# Patient Record
Sex: MALE | Race: BLACK OR AFRICAN AMERICAN | NOT HISPANIC OR LATINO | Employment: UNEMPLOYED | URBAN - NONMETROPOLITAN AREA
[De-identification: names, ages, dates, MRNs, and addresses within clinical notes are randomized per-mention and may not be internally consistent; named-entity substitution may affect disease eponyms.]

---

## 2024-01-25 ENCOUNTER — OFFICE VISIT (OUTPATIENT)
Dept: FAMILY MEDICINE | Facility: CLINIC | Age: 69
End: 2024-01-25
Payer: MEDICARE

## 2024-01-25 VITALS
HEIGHT: 66 IN | DIASTOLIC BLOOD PRESSURE: 96 MMHG | SYSTOLIC BLOOD PRESSURE: 140 MMHG | WEIGHT: 165 LBS | HEART RATE: 85 BPM | TEMPERATURE: 98 F | BODY MASS INDEX: 26.52 KG/M2 | OXYGEN SATURATION: 99 %

## 2024-01-25 DIAGNOSIS — I10 PRIMARY HYPERTENSION: Primary | ICD-10-CM

## 2024-01-25 DIAGNOSIS — Z12.11 COLON CANCER SCREENING: ICD-10-CM

## 2024-01-25 DIAGNOSIS — Z01.00 ROUTINE EYE EXAM: ICD-10-CM

## 2024-01-25 DIAGNOSIS — Z12.5 PROSTATE CANCER SCREENING: ICD-10-CM

## 2024-01-25 DIAGNOSIS — Z72.0 TOBACCO USE: ICD-10-CM

## 2024-01-25 DIAGNOSIS — Z00.00 ANNUAL PHYSICAL EXAM: ICD-10-CM

## 2024-01-25 PROCEDURE — 1101F PT FALLS ASSESS-DOCD LE1/YR: CPT | Mod: CPTII,,, | Performed by: NURSE PRACTITIONER

## 2024-01-25 PROCEDURE — 99202 OFFICE O/P NEW SF 15 MIN: CPT | Mod: ,,, | Performed by: NURSE PRACTITIONER

## 2024-01-25 PROCEDURE — 4010F ACE/ARB THERAPY RXD/TAKEN: CPT | Mod: CPTII,,, | Performed by: NURSE PRACTITIONER

## 2024-01-25 PROCEDURE — G0008 ADMIN INFLUENZA VIRUS VAC: HCPCS | Mod: ,,, | Performed by: NURSE PRACTITIONER

## 2024-01-25 PROCEDURE — 3008F BODY MASS INDEX DOCD: CPT | Mod: CPTII,,, | Performed by: NURSE PRACTITIONER

## 2024-01-25 PROCEDURE — 3044F HG A1C LEVEL LT 7.0%: CPT | Mod: CPTII,,, | Performed by: NURSE PRACTITIONER

## 2024-01-25 PROCEDURE — 90677 PCV20 VACCINE IM: CPT | Mod: ,,, | Performed by: NURSE PRACTITIONER

## 2024-01-25 PROCEDURE — 3288F FALL RISK ASSESSMENT DOCD: CPT | Mod: CPTII,,, | Performed by: NURSE PRACTITIONER

## 2024-01-25 PROCEDURE — G0009 ADMIN PNEUMOCOCCAL VACCINE: HCPCS | Mod: ,,, | Performed by: NURSE PRACTITIONER

## 2024-01-25 PROCEDURE — 90694 VACC AIIV4 NO PRSRV 0.5ML IM: CPT | Mod: ,,, | Performed by: NURSE PRACTITIONER

## 2024-01-25 PROCEDURE — 3080F DIAST BP >= 90 MM HG: CPT | Mod: CPTII,,, | Performed by: NURSE PRACTITIONER

## 2024-01-25 PROCEDURE — 3077F SYST BP >= 140 MM HG: CPT | Mod: CPTII,,, | Performed by: NURSE PRACTITIONER

## 2024-01-25 RX ORDER — IBUPROFEN 200 MG
1 TABLET ORAL DAILY
Qty: 21 PATCH | Refills: 0 | Status: SHIPPED | OUTPATIENT
Start: 2024-01-25 | End: 2024-04-24 | Stop reason: SDUPTHER

## 2024-01-25 RX ORDER — LISINOPRIL 20 MG/1
20 TABLET ORAL DAILY
Qty: 30 TABLET | Refills: 3 | Status: SHIPPED | OUTPATIENT
Start: 2024-01-25 | End: 2024-06-17
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
## 2024-02-07 PROCEDURE — 84443 ASSAY THYROID STIM HORMONE: CPT | Performed by: NURSE PRACTITIONER

## 2024-02-07 PROCEDURE — 83036 HEMOGLOBIN GLYCOSYLATED A1C: CPT | Performed by: NURSE PRACTITIONER

## 2024-02-07 PROCEDURE — 86803 HEPATITIS C AB TEST: CPT | Performed by: NURSE PRACTITIONER

## 2024-02-07 PROCEDURE — 80053 COMPREHEN METABOLIC PANEL: CPT | Performed by: NURSE PRACTITIONER

## 2024-02-07 PROCEDURE — 82306 VITAMIN D 25 HYDROXY: CPT | Performed by: NURSE PRACTITIONER

## 2024-02-07 PROCEDURE — 87389 HIV-1 AG W/HIV-1&-2 AB AG IA: CPT | Performed by: NURSE PRACTITIONER

## 2024-02-07 PROCEDURE — 85025 COMPLETE CBC W/AUTO DIFF WBC: CPT | Performed by: NURSE PRACTITIONER

## 2024-02-07 PROCEDURE — 84153 ASSAY OF PSA TOTAL: CPT | Performed by: NURSE PRACTITIONER

## 2024-02-07 PROCEDURE — 80061 LIPID PANEL: CPT | Performed by: NURSE PRACTITIONER

## 2024-02-27 ENCOUNTER — OFFICE VISIT (OUTPATIENT)
Dept: FAMILY MEDICINE | Facility: CLINIC | Age: 69
End: 2024-02-27
Payer: MEDICARE

## 2024-02-27 VITALS
OXYGEN SATURATION: 99 % | HEART RATE: 84 BPM | WEIGHT: 165 LBS | SYSTOLIC BLOOD PRESSURE: 140 MMHG | DIASTOLIC BLOOD PRESSURE: 88 MMHG | BODY MASS INDEX: 26.52 KG/M2 | HEIGHT: 66 IN | TEMPERATURE: 99 F

## 2024-02-27 DIAGNOSIS — Z87.891 PERSONAL HISTORY OF NICOTINE DEPENDENCE: ICD-10-CM

## 2024-02-27 DIAGNOSIS — F41.9 ANXIETY AND DEPRESSION: ICD-10-CM

## 2024-02-27 DIAGNOSIS — Z01.00 ROUTINE EYE EXAM: ICD-10-CM

## 2024-02-27 DIAGNOSIS — Z13.6 SCREENING FOR ABDOMINAL AORTIC ANEURYSM: ICD-10-CM

## 2024-02-27 DIAGNOSIS — R42 DIZZINESS AND GIDDINESS: Primary | ICD-10-CM

## 2024-02-27 DIAGNOSIS — F17.200 TOBACCO DEPENDENCE: ICD-10-CM

## 2024-02-27 DIAGNOSIS — R94.4 DECREASED GFR: ICD-10-CM

## 2024-02-27 DIAGNOSIS — R06.02 SHORTNESS OF BREATH: ICD-10-CM

## 2024-02-27 DIAGNOSIS — F32.A ANXIETY AND DEPRESSION: ICD-10-CM

## 2024-02-27 DIAGNOSIS — R09.89 DECREASED PULSES IN FEET: ICD-10-CM

## 2024-02-27 PROCEDURE — 3077F SYST BP >= 140 MM HG: CPT | Mod: CPTII,,, | Performed by: NURSE PRACTITIONER

## 2024-02-27 PROCEDURE — 99213 OFFICE O/P EST LOW 20 MIN: CPT | Mod: ,,, | Performed by: NURSE PRACTITIONER

## 2024-02-27 PROCEDURE — 1159F MED LIST DOCD IN RCRD: CPT | Mod: CPTII,,, | Performed by: NURSE PRACTITIONER

## 2024-02-27 PROCEDURE — 1101F PT FALLS ASSESS-DOCD LE1/YR: CPT | Mod: CPTII,,, | Performed by: NURSE PRACTITIONER

## 2024-02-27 PROCEDURE — 4010F ACE/ARB THERAPY RXD/TAKEN: CPT | Mod: CPTII,,, | Performed by: NURSE PRACTITIONER

## 2024-02-27 PROCEDURE — 3008F BODY MASS INDEX DOCD: CPT | Mod: CPTII,,, | Performed by: NURSE PRACTITIONER

## 2024-02-27 PROCEDURE — 3288F FALL RISK ASSESSMENT DOCD: CPT | Mod: CPTII,,, | Performed by: NURSE PRACTITIONER

## 2024-02-27 PROCEDURE — 3044F HG A1C LEVEL LT 7.0%: CPT | Mod: CPTII,,, | Performed by: NURSE PRACTITIONER

## 2024-02-27 PROCEDURE — 3079F DIAST BP 80-89 MM HG: CPT | Mod: CPTII,,, | Performed by: NURSE PRACTITIONER

## 2024-02-27 PROCEDURE — 1160F RVW MEDS BY RX/DR IN RCRD: CPT | Mod: CPTII,,, | Performed by: NURSE PRACTITIONER

## 2024-02-27 RX ORDER — ESCITALOPRAM OXALATE 10 MG/1
10 TABLET ORAL DAILY
Qty: 30 TABLET | Refills: 11 | Status: SHIPPED | OUTPATIENT
Start: 2024-02-27 | End: 2025-02-26
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
## 2024-03-11 ENCOUNTER — TELEPHONE (OUTPATIENT)
Dept: FAMILY MEDICINE | Facility: CLINIC | Age: 69
End: 2024-03-11
Payer: MEDICARE

## 2024-03-22 ENCOUNTER — HOSPITAL ENCOUNTER (OUTPATIENT)
Dept: RADIOLOGY | Facility: HOSPITAL | Age: 69
Discharge: HOME | End: 2024-03-22
Attending: NURSE PRACTITIONER
Payer: MEDICARE

## 2024-03-22 DIAGNOSIS — R42 DIZZINESS AND GIDDINESS: ICD-10-CM

## 2024-03-22 DIAGNOSIS — Z87.891 PERSONAL HISTORY OF NICOTINE DEPENDENCE: ICD-10-CM

## 2024-03-22 DIAGNOSIS — R06.02 SHORTNESS OF BREATH: ICD-10-CM

## 2024-03-22 DIAGNOSIS — F17.200 TOBACCO DEPENDENCE: ICD-10-CM

## 2024-03-22 DIAGNOSIS — R09.89 DECREASED PULSES IN FEET: ICD-10-CM

## 2024-03-22 DIAGNOSIS — Z13.6 SCREENING FOR ABDOMINAL AORTIC ANEURYSM: ICD-10-CM

## 2024-03-22 PROCEDURE — 70450 CT HEAD/BRAIN W/O DYE: CPT | Mod: TC

## 2024-03-22 PROCEDURE — 71046 X-RAY EXAM CHEST 2 VIEWS: CPT | Mod: TC

## 2024-03-22 PROCEDURE — 93925 LOWER EXTREMITY STUDY: CPT | Mod: TC

## 2024-03-22 PROCEDURE — 76775 US EXAM ABDO BACK WALL LIM: CPT | Mod: TC

## 2024-03-22 PROCEDURE — 71271 CT THORAX LUNG CANCER SCR C-: CPT | Mod: TC

## 2024-03-27 ENCOUNTER — OFFICE VISIT (OUTPATIENT)
Dept: FAMILY MEDICINE | Facility: CLINIC | Age: 69
End: 2024-03-27
Payer: MEDICARE

## 2024-03-27 VITALS
WEIGHT: 154 LBS | OXYGEN SATURATION: 99 % | HEIGHT: 66 IN | DIASTOLIC BLOOD PRESSURE: 92 MMHG | HEART RATE: 86 BPM | BODY MASS INDEX: 24.75 KG/M2 | SYSTOLIC BLOOD PRESSURE: 132 MMHG | TEMPERATURE: 98 F

## 2024-03-27 DIAGNOSIS — Z71.2 PERSON CONSULTING FOR EXPLANATION OF EXAMINATION OR TEST FINDING: Primary | ICD-10-CM

## 2024-03-27 DIAGNOSIS — I10 PRIMARY HYPERTENSION: ICD-10-CM

## 2024-03-27 PROCEDURE — 1101F PT FALLS ASSESS-DOCD LE1/YR: CPT | Mod: CPTII,,, | Performed by: NURSE PRACTITIONER

## 2024-03-27 PROCEDURE — 99213 OFFICE O/P EST LOW 20 MIN: CPT | Mod: ,,, | Performed by: NURSE PRACTITIONER

## 2024-03-27 PROCEDURE — 3288F FALL RISK ASSESSMENT DOCD: CPT | Mod: CPTII,,, | Performed by: NURSE PRACTITIONER

## 2024-03-27 PROCEDURE — 3080F DIAST BP >= 90 MM HG: CPT | Mod: CPTII,,, | Performed by: NURSE PRACTITIONER

## 2024-03-27 PROCEDURE — 3044F HG A1C LEVEL LT 7.0%: CPT | Mod: CPTII,,, | Performed by: NURSE PRACTITIONER

## 2024-03-27 PROCEDURE — 3075F SYST BP GE 130 - 139MM HG: CPT | Mod: CPTII,,, | Performed by: NURSE PRACTITIONER

## 2024-03-27 PROCEDURE — 1160F RVW MEDS BY RX/DR IN RCRD: CPT | Mod: CPTII,,, | Performed by: NURSE PRACTITIONER

## 2024-03-27 PROCEDURE — 4010F ACE/ARB THERAPY RXD/TAKEN: CPT | Mod: CPTII,,, | Performed by: NURSE PRACTITIONER

## 2024-03-27 PROCEDURE — 3008F BODY MASS INDEX DOCD: CPT | Mod: CPTII,,, | Performed by: NURSE PRACTITIONER

## 2024-03-27 PROCEDURE — 1159F MED LIST DOCD IN RCRD: CPT | Mod: CPTII,,, | Performed by: NURSE PRACTITIONER
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
## 2024-04-24 ENCOUNTER — OFFICE VISIT (OUTPATIENT)
Dept: FAMILY MEDICINE | Facility: CLINIC | Age: 69
End: 2024-04-24
Payer: MEDICARE

## 2024-04-24 VITALS
SYSTOLIC BLOOD PRESSURE: 142 MMHG | WEIGHT: 152 LBS | DIASTOLIC BLOOD PRESSURE: 88 MMHG | HEIGHT: 66 IN | OXYGEN SATURATION: 99 % | HEART RATE: 76 BPM | BODY MASS INDEX: 24.43 KG/M2 | TEMPERATURE: 98 F

## 2024-04-24 DIAGNOSIS — I10 PRIMARY HYPERTENSION: ICD-10-CM

## 2024-04-24 DIAGNOSIS — M25.512 CHRONIC LEFT SHOULDER PAIN: ICD-10-CM

## 2024-04-24 DIAGNOSIS — R42 DIZZINESS: ICD-10-CM

## 2024-04-24 DIAGNOSIS — G89.29 CHRONIC LEFT SHOULDER PAIN: ICD-10-CM

## 2024-04-24 DIAGNOSIS — Z72.0 TOBACCO USE: ICD-10-CM

## 2024-04-24 DIAGNOSIS — R94.4 DECREASED GFR: ICD-10-CM

## 2024-04-24 DIAGNOSIS — R93.0 ABNORMAL CT OF THE HEAD: Primary | ICD-10-CM

## 2024-04-24 LAB
ALBUMIN SERPL-MCNC: 3.9 G/DL (ref 3.4–5)
ALBUMIN/GLOB SERPL: 1.5 RATIO
ALP SERPL-CCNC: 58 UNIT/L (ref 50–144)
ALT SERPL-CCNC: 33 UNIT/L (ref 1–45)
ANION GAP SERPL CALC-SCNC: 9 MEQ/L (ref 2–13)
AST SERPL-CCNC: 34 UNIT/L (ref 17–59)
BILIRUB SERPL-MCNC: 0.7 MG/DL (ref 0–1)
BUN SERPL-MCNC: 12 MG/DL (ref 7–20)
CALCIUM SERPL-MCNC: 9.1 MG/DL (ref 8.4–10.2)
CHLORIDE SERPL-SCNC: 103 MMOL/L (ref 98–110)
CO2 SERPL-SCNC: 29 MMOL/L (ref 21–32)
CREAT SERPL-MCNC: 1.22 MG/DL (ref 0.66–1.25)
CREAT/UREA NIT SERPL: 10 (ref 12–20)
GFR SERPLBLD CREATININE-BSD FMLA CKD-EPI: 65 MLS/MIN/1.73/M2
GLOBULIN SER-MCNC: 2.6 GM/DL (ref 2–3.9)
GLUCOSE SERPL-MCNC: 63 MG/DL (ref 70–115)
POTASSIUM SERPL-SCNC: 4 MMOL/L (ref 3.5–5.1)
PROT SERPL-MCNC: 6.5 GM/DL (ref 6.3–8.2)
SODIUM SERPL-SCNC: 141 MMOL/L (ref 135–145)

## 2024-04-24 PROCEDURE — 3075F SYST BP GE 130 - 139MM HG: CPT | Mod: CPTII,,, | Performed by: NURSE PRACTITIONER

## 2024-04-24 PROCEDURE — 4010F ACE/ARB THERAPY RXD/TAKEN: CPT | Mod: CPTII,,, | Performed by: NURSE PRACTITIONER

## 2024-04-24 PROCEDURE — 3288F FALL RISK ASSESSMENT DOCD: CPT | Mod: CPTII,,, | Performed by: NURSE PRACTITIONER

## 2024-04-24 PROCEDURE — 1159F MED LIST DOCD IN RCRD: CPT | Mod: CPTII,,, | Performed by: NURSE PRACTITIONER

## 2024-04-24 PROCEDURE — 1101F PT FALLS ASSESS-DOCD LE1/YR: CPT | Mod: CPTII,,, | Performed by: NURSE PRACTITIONER

## 2024-04-24 PROCEDURE — 36415 COLL VENOUS BLD VENIPUNCTURE: CPT | Performed by: NURSE PRACTITIONER

## 2024-04-24 PROCEDURE — 80053 COMPREHEN METABOLIC PANEL: CPT | Performed by: NURSE PRACTITIONER

## 2024-04-24 PROCEDURE — 3079F DIAST BP 80-89 MM HG: CPT | Mod: CPTII,,, | Performed by: NURSE PRACTITIONER

## 2024-04-24 PROCEDURE — 1160F RVW MEDS BY RX/DR IN RCRD: CPT | Mod: CPTII,,, | Performed by: NURSE PRACTITIONER

## 2024-04-24 PROCEDURE — 3044F HG A1C LEVEL LT 7.0%: CPT | Mod: CPTII,,, | Performed by: NURSE PRACTITIONER

## 2024-04-24 PROCEDURE — 99214 OFFICE O/P EST MOD 30 MIN: CPT | Mod: ,,, | Performed by: NURSE PRACTITIONER

## 2024-04-24 PROCEDURE — 3008F BODY MASS INDEX DOCD: CPT | Mod: CPTII,,, | Performed by: NURSE PRACTITIONER

## 2024-04-24 RX ORDER — AMLODIPINE BESYLATE 2.5 MG/1
2.5 TABLET ORAL DAILY
Qty: 90 TABLET | Refills: 3 | Status: SHIPPED | OUTPATIENT
Start: 2024-04-24 | End: 2025-04-24

## 2024-04-24 RX ORDER — IBUPROFEN 200 MG
1 TABLET ORAL DAILY
Qty: 21 PATCH | Refills: 0 | Status: SHIPPED | OUTPATIENT
Start: 2024-04-24

## 2024-05-28 ENCOUNTER — OFFICE VISIT (OUTPATIENT)
Dept: FAMILY MEDICINE | Facility: CLINIC | Age: 69
End: 2024-05-28
Payer: MEDICARE

## 2024-05-28 VITALS
OXYGEN SATURATION: 99 % | SYSTOLIC BLOOD PRESSURE: 132 MMHG | WEIGHT: 151 LBS | TEMPERATURE: 98 F | HEART RATE: 85 BPM | BODY MASS INDEX: 24.27 KG/M2 | HEIGHT: 66 IN | DIASTOLIC BLOOD PRESSURE: 88 MMHG

## 2024-05-28 DIAGNOSIS — Z71.2 PERSON CONSULTING FOR EXPLANATION OF EXAMINATION OR TEST FINDING: ICD-10-CM

## 2024-05-28 DIAGNOSIS — R42 DIZZINESS: ICD-10-CM

## 2024-05-28 DIAGNOSIS — I10 PRIMARY HYPERTENSION: Primary | ICD-10-CM

## 2024-05-28 PROCEDURE — 4010F ACE/ARB THERAPY RXD/TAKEN: CPT | Mod: CPTII,,, | Performed by: NURSE PRACTITIONER

## 2024-05-28 PROCEDURE — 3075F SYST BP GE 130 - 139MM HG: CPT | Mod: CPTII,,, | Performed by: NURSE PRACTITIONER

## 2024-05-28 PROCEDURE — 3079F DIAST BP 80-89 MM HG: CPT | Mod: CPTII,,, | Performed by: NURSE PRACTITIONER

## 2024-05-28 PROCEDURE — 1159F MED LIST DOCD IN RCRD: CPT | Mod: CPTII,,, | Performed by: NURSE PRACTITIONER

## 2024-05-28 PROCEDURE — 3008F BODY MASS INDEX DOCD: CPT | Mod: CPTII,,, | Performed by: NURSE PRACTITIONER

## 2024-05-28 PROCEDURE — 3044F HG A1C LEVEL LT 7.0%: CPT | Mod: CPTII,,, | Performed by: NURSE PRACTITIONER

## 2024-05-28 PROCEDURE — 99213 OFFICE O/P EST LOW 20 MIN: CPT | Mod: ,,, | Performed by: NURSE PRACTITIONER

## 2024-05-28 PROCEDURE — 1160F RVW MEDS BY RX/DR IN RCRD: CPT | Mod: CPTII,,, | Performed by: NURSE PRACTITIONER

## 2024-05-28 PROCEDURE — 1101F PT FALLS ASSESS-DOCD LE1/YR: CPT | Mod: CPTII,,, | Performed by: NURSE PRACTITIONER

## 2024-05-28 PROCEDURE — 3288F FALL RISK ASSESSMENT DOCD: CPT | Mod: CPTII,,, | Performed by: NURSE PRACTITIONER
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
## 2024-06-17 DIAGNOSIS — I10 PRIMARY HYPERTENSION: ICD-10-CM

## 2024-06-17 RX ORDER — LISINOPRIL 20 MG/1
20 TABLET ORAL
Qty: 30 TABLET | Refills: 3 | Status: SHIPPED | OUTPATIENT
Start: 2024-06-17

## 2024-07-16 ENCOUNTER — TELEPHONE (OUTPATIENT)
Dept: FAMILY MEDICINE | Facility: CLINIC | Age: 69
End: 2024-07-16
Payer: MEDICARE

## 2024-07-16 DIAGNOSIS — F41.9 ANXIETY AND DEPRESSION: ICD-10-CM

## 2024-07-16 DIAGNOSIS — F32.A ANXIETY AND DEPRESSION: ICD-10-CM

## 2024-07-16 DIAGNOSIS — I10 PRIMARY HYPERTENSION: ICD-10-CM

## 2024-07-16 RX ORDER — LISINOPRIL 20 MG/1
20 TABLET ORAL DAILY
Qty: 30 TABLET | Refills: 2 | Status: SHIPPED | OUTPATIENT
Start: 2024-07-16

## 2024-07-16 RX ORDER — ESCITALOPRAM OXALATE 10 MG/1
10 TABLET ORAL DAILY
Qty: 30 TABLET | Refills: 2 | Status: SHIPPED | OUTPATIENT
Start: 2024-07-16 | End: 2025-07-16

## 2024-10-15 ENCOUNTER — OFFICE VISIT (OUTPATIENT)
Dept: FAMILY MEDICINE | Facility: CLINIC | Age: 69
End: 2024-10-15
Payer: MEDICARE

## 2024-10-15 VITALS
SYSTOLIC BLOOD PRESSURE: 130 MMHG | DIASTOLIC BLOOD PRESSURE: 86 MMHG | WEIGHT: 151 LBS | HEIGHT: 66 IN | OXYGEN SATURATION: 99 % | BODY MASS INDEX: 24.27 KG/M2 | HEART RATE: 92 BPM | TEMPERATURE: 97 F

## 2024-10-15 DIAGNOSIS — R93.0 ABNORMAL CT OF THE HEAD: ICD-10-CM

## 2024-10-15 DIAGNOSIS — F41.9 ANXIETY AND DEPRESSION: ICD-10-CM

## 2024-10-15 DIAGNOSIS — G89.29 CHRONIC LEFT SHOULDER PAIN: ICD-10-CM

## 2024-10-15 DIAGNOSIS — I10 HYPERTENSION, UNSPECIFIED TYPE: Primary | ICD-10-CM

## 2024-10-15 DIAGNOSIS — F32.A ANXIETY AND DEPRESSION: ICD-10-CM

## 2024-10-15 DIAGNOSIS — M25.512 CHRONIC LEFT SHOULDER PAIN: ICD-10-CM

## 2024-10-15 DIAGNOSIS — Z12.11 COLON CANCER SCREENING: ICD-10-CM

## 2024-10-15 PROCEDURE — 3008F BODY MASS INDEX DOCD: CPT | Mod: CPTII,,, | Performed by: NURSE PRACTITIONER

## 2024-10-15 PROCEDURE — 1159F MED LIST DOCD IN RCRD: CPT | Mod: CPTII,,, | Performed by: NURSE PRACTITIONER

## 2024-10-15 PROCEDURE — 3075F SYST BP GE 130 - 139MM HG: CPT | Mod: CPTII,,, | Performed by: NURSE PRACTITIONER

## 2024-10-15 PROCEDURE — 3079F DIAST BP 80-89 MM HG: CPT | Mod: CPTII,,, | Performed by: NURSE PRACTITIONER

## 2024-10-15 PROCEDURE — 4010F ACE/ARB THERAPY RXD/TAKEN: CPT | Mod: CPTII,,, | Performed by: NURSE PRACTITIONER

## 2024-10-15 PROCEDURE — 3044F HG A1C LEVEL LT 7.0%: CPT | Mod: CPTII,,, | Performed by: NURSE PRACTITIONER

## 2024-10-15 PROCEDURE — 99214 OFFICE O/P EST MOD 30 MIN: CPT | Mod: ,,, | Performed by: NURSE PRACTITIONER

## 2024-10-15 RX ORDER — BUPROPION HYDROCHLORIDE 150 MG/1
150 TABLET ORAL DAILY
Qty: 30 TABLET | Refills: 1 | Status: SHIPPED | OUTPATIENT
Start: 2024-10-15

## 2024-10-15 RX ORDER — MELOXICAM 7.5 MG/1
7.5 TABLET ORAL DAILY
Qty: 30 TABLET | Refills: 1 | Status: SHIPPED | OUTPATIENT
Start: 2024-10-15

## 2024-10-15 NOTE — PROGRESS NOTES
"SUBJECTIVE:  William South is a 69 y.o. male here for Follow-up      HPI  Presents to the clinic in follow-up for chronic conditions.   Also with c/o having issues with intercourse.  Says he doesn't the stamina he used to.  He had an abnormal CT of the head, but never had the follow-up MRI.  He also never had the left shoulder x-ray that was ordered.  He still has issues on and off with anxiety and depression, but today feels okay.      Kaileys allergies, medications, history, and problem list were updated as appropriate.    Review of Systems   A comprehensive review of symptoms was completed and negative except as noted above.    No results found for this or any previous visit (from the past 3 weeks).    OBJECTIVE:  Vital signs  Vitals:    10/15/24 0825   BP: 130/86   Pulse: 92   Temp: 96.5 °F (35.8 °C)   SpO2: 99%   Weight: 68.5 kg (151 lb)   Height: 5' 6" (1.676 m)        Physical Exam  Constitutional:       Appearance: Normal appearance.   HENT:      Head: Normocephalic and atraumatic.      Nose: Nose normal.      Mouth/Throat:      Mouth: Mucous membranes are moist.      Pharynx: Oropharynx is clear.   Eyes:      Conjunctiva/sclera: Conjunctivae normal.   Cardiovascular:      Rate and Rhythm: Normal rate and regular rhythm.   Pulmonary:      Effort: Pulmonary effort is normal.      Breath sounds: Normal breath sounds.   Abdominal:      General: Bowel sounds are normal.      Palpations: Abdomen is soft.   Musculoskeletal:      Right shoulder: Normal.      Left shoulder: Crepitus present. Decreased range of motion.      Cervical back: Normal range of motion and neck supple.      Comments: Palpable popping to the left shoulder   Skin:     General: Skin is warm.      Capillary Refill: Capillary refill takes less than 2 seconds.   Neurological:      Mental Status: He is alert.   Psychiatric:         Mood and Affect: Mood is anxious.         Behavior: Behavior is cooperative.          ASSESSMENT/PLAN:  1. " Hypertension, unspecified type  Comments:  stable with current meds, never had cardiology evaluation so will refer  Orders:  -     Ambulatory referral/consult to Cardiology    2. Anxiety and depression  Comments:  will add bupropion for help with sexual side effects to SSRI  Orders:  -     buPROPion (WELLBUTRIN XL) 150 MG TB24 tablet; Take 1 tablet (150 mg total) by mouth once daily.  Dispense: 30 tablet; Refill: 1    3. Abnormal CT of the head  Comments:  will check on status of rescheduling MRI    4. Colon cancer screening  -     Cologuard Screening (Multitarget Stool DNA); Future; Expected date: 10/15/2024    5. Chronic left shoulder pain  Comments:  will check on status of rescheduling shoulder x-ray  Orders:  -     meloxicam (MOBIC) 7.5 MG tablet; Take 1 tablet (7.5 mg total) by mouth once daily.  Dispense: 30 tablet; Refill: 1        Follow Up:  Follow up in about 1 month (around 11/15/2024), or if symptoms worsen or fail to improve.

## 2024-10-28 ENCOUNTER — TELEPHONE (OUTPATIENT)
Dept: FAMILY MEDICINE | Facility: CLINIC | Age: 69
End: 2024-10-28
Payer: MEDICARE

## 2024-11-19 ENCOUNTER — TELEPHONE (OUTPATIENT)
Dept: FAMILY MEDICINE | Facility: CLINIC | Age: 69
End: 2024-11-19
Payer: MEDICARE

## 2024-11-19 DIAGNOSIS — M25.512 CHRONIC LEFT SHOULDER PAIN: ICD-10-CM

## 2024-11-19 DIAGNOSIS — G89.29 CHRONIC LEFT SHOULDER PAIN: ICD-10-CM

## 2024-11-19 DIAGNOSIS — F32.A ANXIETY AND DEPRESSION: ICD-10-CM

## 2024-11-19 DIAGNOSIS — F41.9 ANXIETY AND DEPRESSION: ICD-10-CM

## 2024-11-19 RX ORDER — BUPROPION HYDROCHLORIDE 150 MG/1
150 TABLET ORAL DAILY
Qty: 30 TABLET | Refills: 1 | Status: SHIPPED | OUTPATIENT
Start: 2024-11-19

## 2024-11-19 RX ORDER — MELOXICAM 7.5 MG/1
7.5 TABLET ORAL DAILY
Qty: 30 TABLET | Refills: 1 | Status: SHIPPED | OUTPATIENT
Start: 2024-11-19

## 2024-12-09 ENCOUNTER — TELEPHONE (OUTPATIENT)
Dept: FAMILY MEDICINE | Facility: CLINIC | Age: 69
End: 2024-12-09
Payer: MEDICARE

## 2024-12-09 DIAGNOSIS — F41.9 ANXIETY AND DEPRESSION: ICD-10-CM

## 2024-12-09 DIAGNOSIS — M25.512 CHRONIC LEFT SHOULDER PAIN: ICD-10-CM

## 2024-12-09 DIAGNOSIS — G89.29 CHRONIC LEFT SHOULDER PAIN: ICD-10-CM

## 2024-12-09 DIAGNOSIS — F32.A ANXIETY AND DEPRESSION: ICD-10-CM

## 2024-12-09 DIAGNOSIS — I10 PRIMARY HYPERTENSION: ICD-10-CM

## 2024-12-09 RX ORDER — ESCITALOPRAM OXALATE 10 MG/1
10 TABLET ORAL DAILY
Qty: 30 TABLET | Refills: 2 | Status: SHIPPED | OUTPATIENT
Start: 2024-12-09 | End: 2025-12-09

## 2024-12-09 RX ORDER — MELOXICAM 7.5 MG/1
7.5 TABLET ORAL DAILY
Qty: 30 TABLET | Refills: 1 | Status: SHIPPED | OUTPATIENT
Start: 2024-12-09

## 2024-12-09 RX ORDER — LISINOPRIL 20 MG/1
20 TABLET ORAL DAILY
Qty: 30 TABLET | Refills: 11 | Status: SHIPPED | OUTPATIENT
Start: 2024-12-09

## 2024-12-09 RX ORDER — BUPROPION HYDROCHLORIDE 150 MG/1
150 TABLET ORAL DAILY
Qty: 30 TABLET | Refills: 2 | Status: SHIPPED | OUTPATIENT
Start: 2024-12-09

## 2024-12-30 ENCOUNTER — TELEPHONE (OUTPATIENT)
Dept: FAMILY MEDICINE | Facility: CLINIC | Age: 69
End: 2024-12-30
Payer: MEDICARE

## 2024-12-30 NOTE — TELEPHONE ENCOUNTER
Tried calling pt and he did not answer nor does he has a vm set up.     All of pts medications he is currently on right now is once a day.

## 2025-02-05 ENCOUNTER — TELEPHONE (OUTPATIENT)
Dept: FAMILY MEDICINE | Facility: CLINIC | Age: 70
End: 2025-02-05

## 2025-02-05 ENCOUNTER — OFFICE VISIT (OUTPATIENT)
Dept: FAMILY MEDICINE | Facility: CLINIC | Age: 70
End: 2025-02-05
Payer: MEDICARE

## 2025-02-05 VITALS
OXYGEN SATURATION: 95 % | BODY MASS INDEX: 23.92 KG/M2 | HEART RATE: 102 BPM | DIASTOLIC BLOOD PRESSURE: 78 MMHG | WEIGHT: 148.81 LBS | SYSTOLIC BLOOD PRESSURE: 124 MMHG | TEMPERATURE: 98 F | HEIGHT: 66 IN

## 2025-02-05 DIAGNOSIS — Z91.148 NONCOMPLIANCE WITH MEDICATION REGIMEN: ICD-10-CM

## 2025-02-05 DIAGNOSIS — F14.10 COCAINE ABUSE: ICD-10-CM

## 2025-02-05 DIAGNOSIS — R53.83 FATIGUE, UNSPECIFIED TYPE: ICD-10-CM

## 2025-02-05 DIAGNOSIS — I50.32 CHRONIC DIASTOLIC CONGESTIVE HEART FAILURE: Primary | ICD-10-CM

## 2025-02-05 DIAGNOSIS — Z91.199 NONCOMPLIANCE WITH DIAGNOSTIC TESTING: ICD-10-CM

## 2025-02-05 PROBLEM — I10 PRIMARY HYPERTENSION: Chronic | Status: ACTIVE | Noted: 2024-12-22

## 2025-02-05 PROBLEM — I50.9 CHF (CONGESTIVE HEART FAILURE): Status: ACTIVE | Noted: 2024-12-25

## 2025-02-05 PROBLEM — F19.10 SUBSTANCE ABUSE: Status: ACTIVE | Noted: 2024-12-22

## 2025-02-05 PROBLEM — I71.21 ANEURYSM OF ASCENDING AORTA WITHOUT RUPTURE: Status: ACTIVE | Noted: 2024-12-21

## 2025-02-05 PROBLEM — J41.0 SIMPLE CHRONIC BRONCHITIS: Status: ACTIVE | Noted: 2024-12-21

## 2025-02-05 PROBLEM — Z72.0 TOBACCO ABUSE DISORDER: Status: ACTIVE | Noted: 2024-12-21

## 2025-02-05 PROCEDURE — 3078F DIAST BP <80 MM HG: CPT | Mod: CPTII,,, | Performed by: NURSE PRACTITIONER

## 2025-02-05 PROCEDURE — G2211 COMPLEX E/M VISIT ADD ON: HCPCS | Mod: ,,, | Performed by: NURSE PRACTITIONER

## 2025-02-05 PROCEDURE — 99214 OFFICE O/P EST MOD 30 MIN: CPT | Mod: ,,, | Performed by: NURSE PRACTITIONER

## 2025-02-05 PROCEDURE — 3008F BODY MASS INDEX DOCD: CPT | Mod: CPTII,,, | Performed by: NURSE PRACTITIONER

## 2025-02-05 PROCEDURE — 3074F SYST BP LT 130 MM HG: CPT | Mod: CPTII,,, | Performed by: NURSE PRACTITIONER

## 2025-02-05 PROCEDURE — 4010F ACE/ARB THERAPY RXD/TAKEN: CPT | Mod: CPTII,,, | Performed by: NURSE PRACTITIONER

## 2025-02-05 RX ORDER — METOPROLOL SUCCINATE 25 MG/1
1 TABLET, EXTENDED RELEASE ORAL EVERY MORNING
COMMUNITY
Start: 2024-12-26 | End: 2025-12-26

## 2025-02-05 RX ORDER — ATORVASTATIN CALCIUM 40 MG/1
40 TABLET, FILM COATED ORAL NIGHTLY
COMMUNITY
Start: 2024-12-25 | End: 2025-06-23

## 2025-02-05 RX ORDER — NAPROXEN SODIUM 220 MG/1
1 TABLET, FILM COATED ORAL EVERY MORNING
COMMUNITY
Start: 2024-12-25 | End: 2025-12-25

## 2025-02-05 RX ORDER — PANTOPRAZOLE SODIUM 40 MG/1
1 TABLET, DELAYED RELEASE ORAL EVERY MORNING
COMMUNITY
Start: 2024-12-26
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
## 2025-02-06 ENCOUNTER — TELEPHONE (OUTPATIENT)
Dept: FAMILY MEDICINE | Facility: CLINIC | Age: 70
End: 2025-02-06
Payer: MEDICARE

## 2025-02-06 ENCOUNTER — HOSPITAL ENCOUNTER (INPATIENT)
Facility: HOSPITAL | Age: 70
LOS: 3 days | Discharge: HOME HEALTH | DRG: 291 | End: 2025-02-10
Admitting: FAMILY MEDICINE
Payer: MEDICARE

## 2025-02-06 ENCOUNTER — HOSPITAL ENCOUNTER (OUTPATIENT)
Dept: RADIOLOGY | Facility: HOSPITAL | Age: 70
Discharge: HOME | DRG: 291 | End: 2025-02-06
Attending: NURSE PRACTITIONER
Payer: MEDICARE

## 2025-02-06 DIAGNOSIS — R06.02 SHORTNESS OF BREATH: ICD-10-CM

## 2025-02-06 DIAGNOSIS — I50.9 ACUTE ON CHRONIC CONGESTIVE HEART FAILURE, UNSPECIFIED HEART FAILURE TYPE: Primary | ICD-10-CM

## 2025-02-06 DIAGNOSIS — I50.32 CHRONIC DIASTOLIC CONGESTIVE HEART FAILURE: ICD-10-CM

## 2025-02-06 DIAGNOSIS — F14.10 COCAINE ABUSE: ICD-10-CM

## 2025-02-06 LAB
ALBUMIN SERPL-MCNC: 3.7 G/DL (ref 3.4–5)
ALBUMIN/GLOB SERPL: 1.4 RATIO
ALP SERPL-CCNC: 80 UNIT/L (ref 50–144)
ALT SERPL-CCNC: 127 UNIT/L (ref 1–45)
AMPHET UR QL SCN: NEGATIVE
ANION GAP SERPL CALC-SCNC: 6 MEQ/L (ref 2–13)
AST SERPL-CCNC: 93 UNIT/L (ref 17–59)
BARBITURATE SCN PRESENT UR: NEGATIVE
BASOPHILS # BLD AUTO: 0.03 X10(3)/MCL (ref 0.01–0.08)
BASOPHILS NFR BLD AUTO: 0.4 % (ref 0.1–1.2)
BENZODIAZ UR QL SCN: NEGATIVE
BILIRUB SERPL-MCNC: 1.4 MG/DL (ref 0–1)
BNP BLD-MCNC: ABNORMAL PG/ML (ref 0–124.9)
BUN SERPL-MCNC: 31 MG/DL (ref 7–20)
CALCIUM SERPL-MCNC: 8.7 MG/DL (ref 8.4–10.2)
CANNABINOIDS UR QL SCN: NEGATIVE
CHLORIDE SERPL-SCNC: 105 MMOL/L (ref 98–110)
CO2 SERPL-SCNC: 22 MMOL/L (ref 21–32)
COCAINE UR QL SCN: POSITIVE
CREAT SERPL-MCNC: 1.25 MG/DL (ref 0.66–1.25)
CREAT/UREA NIT SERPL: 25 (ref 12–20)
EOSINOPHIL # BLD AUTO: 0.18 X10(3)/MCL (ref 0.04–0.54)
EOSINOPHIL NFR BLD AUTO: 2.6 % (ref 0.7–7)
ERYTHROCYTE [DISTWIDTH] IN BLOOD BY AUTOMATED COUNT: 14.9 %
GFR SERPLBLD CREATININE-BSD FMLA CKD-EPI: 62 ML/MIN/1.73/M2
GLOBULIN SER-MCNC: 2.6 GM/DL (ref 2–3.9)
GLUCOSE SERPL-MCNC: 101 MG/DL (ref 70–115)
HCT VFR BLD AUTO: 42.7 % (ref 36–52)
HGB BLD-MCNC: 14.6 G/DL (ref 13–18)
IMM GRANULOCYTES # BLD AUTO: 0.02 X10(3)/MCL (ref 0–0.03)
IMM GRANULOCYTES NFR BLD AUTO: 0.3 % (ref 0–0.5)
LYMPHOCYTES # BLD AUTO: 1.22 X10(3)/MCL (ref 1.32–3.57)
LYMPHOCYTES NFR BLD AUTO: 17.6 % (ref 20–55)
MCH RBC QN AUTO: 31.7 PG (ref 27–34)
MCHC RBC AUTO-ENTMCNC: 34.2 G/DL (ref 31–37)
MCV RBC AUTO: 92.6 FL (ref 79–99)
METHADONE UR QL SCN: NEGATIVE
MONOCYTES # BLD AUTO: 0.53 X10(3)/MCL (ref 0.3–0.82)
MONOCYTES NFR BLD AUTO: 7.6 % (ref 4.7–12.5)
NEUTROPHILS # BLD AUTO: 4.96 X10(3)/MCL (ref 1.78–5.38)
NEUTROPHILS NFR BLD AUTO: 71.5 % (ref 37–73)
NRBC BLD AUTO-RTO: 0 %
OPIATES UR QL SCN: NEGATIVE
PCP UR QL: NEGATIVE
PH UR: 6 [PH] (ref 5–8)
PLATELET # BLD AUTO: 235 X10(3)/MCL (ref 140–371)
PMV BLD AUTO: 9.1 FL (ref 9.4–12.4)
POTASSIUM SERPL-SCNC: 4.6 MMOL/L (ref 3.5–5.1)
PROT SERPL-MCNC: 6.3 GM/DL (ref 6.3–8.2)
RBC # BLD AUTO: 4.61 X10(6)/MCL (ref 4–6)
SODIUM SERPL-SCNC: 133 MMOL/L (ref 136–145)
TROPONIN I SERPL-MCNC: 0.01 NG/ML (ref 0–0.03)
TROPONIN I SERPL-MCNC: 0.01 NG/ML (ref 0–0.03)
WBC # BLD AUTO: 6.94 X10(3)/MCL (ref 4–11.5)

## 2025-02-06 PROCEDURE — 25000003 PHARM REV CODE 250

## 2025-02-06 PROCEDURE — 85025 COMPLETE CBC W/AUTO DIFF WBC: CPT

## 2025-02-06 PROCEDURE — 96376 TX/PRO/DX INJ SAME DRUG ADON: CPT

## 2025-02-06 PROCEDURE — G0378 HOSPITAL OBSERVATION PER HR: HCPCS

## 2025-02-06 PROCEDURE — 63600175 PHARM REV CODE 636 W HCPCS: Performed by: INTERNAL MEDICINE

## 2025-02-06 PROCEDURE — 84484 ASSAY OF TROPONIN QUANT: CPT | Mod: 91 | Performed by: INTERNAL MEDICINE

## 2025-02-06 PROCEDURE — 96374 THER/PROPH/DIAG INJ IV PUSH: CPT

## 2025-02-06 PROCEDURE — 96372 THER/PROPH/DIAG INJ SC/IM: CPT | Performed by: INTERNAL MEDICINE

## 2025-02-06 PROCEDURE — 63600175 PHARM REV CODE 636 W HCPCS

## 2025-02-06 PROCEDURE — 71046 X-RAY EXAM CHEST 2 VIEWS: CPT | Mod: TC

## 2025-02-06 PROCEDURE — 96375 TX/PRO/DX INJ NEW DRUG ADDON: CPT

## 2025-02-06 PROCEDURE — 83880 ASSAY OF NATRIURETIC PEPTIDE: CPT

## 2025-02-06 PROCEDURE — 93005 ELECTROCARDIOGRAM TRACING: CPT

## 2025-02-06 PROCEDURE — 99285 EMERGENCY DEPT VISIT HI MDM: CPT | Mod: 25

## 2025-02-06 PROCEDURE — 80053 COMPREHEN METABOLIC PANEL: CPT

## 2025-02-06 PROCEDURE — 93010 ELECTROCARDIOGRAM REPORT: CPT | Mod: ,,, | Performed by: INTERNAL MEDICINE

## 2025-02-06 PROCEDURE — 84484 ASSAY OF TROPONIN QUANT: CPT

## 2025-02-06 PROCEDURE — 96372 THER/PROPH/DIAG INJ SC/IM: CPT

## 2025-02-06 PROCEDURE — 36415 COLL VENOUS BLD VENIPUNCTURE: CPT | Performed by: INTERNAL MEDICINE

## 2025-02-06 PROCEDURE — 80307 DRUG TEST PRSMV CHEM ANLYZR: CPT

## 2025-02-06 RX ORDER — ENOXAPARIN SODIUM 100 MG/ML
40 INJECTION SUBCUTANEOUS EVERY 24 HOURS
Status: DISCONTINUED | OUTPATIENT
Start: 2025-02-06 | End: 2025-02-10 | Stop reason: HOSPADM

## 2025-02-06 RX ORDER — ATORVASTATIN CALCIUM 40 MG/1
40 TABLET, FILM COATED ORAL DAILY
Status: DISCONTINUED | OUTPATIENT
Start: 2025-02-07 | End: 2025-02-10 | Stop reason: HOSPADM

## 2025-02-06 RX ORDER — NAPROXEN SODIUM 220 MG/1
81 TABLET, FILM COATED ORAL EVERY MORNING
Status: DISCONTINUED | OUTPATIENT
Start: 2025-02-07 | End: 2025-02-10 | Stop reason: HOSPADM

## 2025-02-06 RX ORDER — METOPROLOL SUCCINATE 25 MG/1
25 TABLET, EXTENDED RELEASE ORAL EVERY MORNING
Status: DISCONTINUED | OUTPATIENT
Start: 2025-02-07 | End: 2025-02-10 | Stop reason: HOSPADM

## 2025-02-06 RX ORDER — LORAZEPAM 2 MG/ML
2 INJECTION INTRAMUSCULAR EVERY 4 HOURS PRN
Status: DISCONTINUED | OUTPATIENT
Start: 2025-02-06 | End: 2025-02-10 | Stop reason: HOSPADM

## 2025-02-06 RX ORDER — HALOPERIDOL DECANOATE 100 MG/ML
100 INJECTION INTRAMUSCULAR ONCE
Status: DISCONTINUED | OUTPATIENT
Start: 2025-02-06 | End: 2025-02-06

## 2025-02-06 RX ORDER — PANTOPRAZOLE SODIUM 40 MG/1
40 TABLET, DELAYED RELEASE ORAL EVERY MORNING
Status: DISCONTINUED | OUTPATIENT
Start: 2025-02-07 | End: 2025-02-10 | Stop reason: HOSPADM

## 2025-02-06 RX ORDER — ZOLPIDEM TARTRATE 5 MG/1
5 TABLET ORAL NIGHTLY PRN
Status: DISCONTINUED | OUTPATIENT
Start: 2025-02-06 | End: 2025-02-10 | Stop reason: HOSPADM

## 2025-02-06 RX ORDER — LORAZEPAM 2 MG/ML
1 INJECTION INTRAMUSCULAR
Status: COMPLETED | OUTPATIENT
Start: 2025-02-06 | End: 2025-02-06

## 2025-02-06 RX ORDER — BUPROPION HYDROCHLORIDE 75 MG/1
75 TABLET ORAL 2 TIMES DAILY
Status: DISCONTINUED | OUTPATIENT
Start: 2025-02-06 | End: 2025-02-10 | Stop reason: HOSPADM

## 2025-02-06 RX ORDER — LISINOPRIL 10 MG/1
20 TABLET ORAL DAILY
Status: DISCONTINUED | OUTPATIENT
Start: 2025-02-07 | End: 2025-02-10 | Stop reason: HOSPADM

## 2025-02-06 RX ORDER — FUROSEMIDE 10 MG/ML
80 INJECTION INTRAMUSCULAR; INTRAVENOUS
Status: COMPLETED | OUTPATIENT
Start: 2025-02-06 | End: 2025-02-06

## 2025-02-06 RX ORDER — SODIUM CHLORIDE 450 MG/100ML
INJECTION, SOLUTION INTRAVENOUS CONTINUOUS
Status: DISCONTINUED | OUTPATIENT
Start: 2025-02-06 | End: 2025-02-06

## 2025-02-06 RX ORDER — ACETAMINOPHEN 325 MG/1
650 TABLET ORAL EVERY 4 HOURS PRN
Status: DISCONTINUED | OUTPATIENT
Start: 2025-02-06 | End: 2025-02-10 | Stop reason: HOSPADM

## 2025-02-06 RX ORDER — HALOPERIDOL DECANOATE 100 MG/ML
25 INJECTION INTRAMUSCULAR ONCE
Status: DISCONTINUED | OUTPATIENT
Start: 2025-02-06 | End: 2025-02-06

## 2025-02-06 RX ORDER — HALOPERIDOL 5 MG/ML
5 INJECTION INTRAMUSCULAR ONCE
Status: COMPLETED | OUTPATIENT
Start: 2025-02-06 | End: 2025-02-06

## 2025-02-06 RX ORDER — FUROSEMIDE 10 MG/ML
40 INJECTION INTRAMUSCULAR; INTRAVENOUS EVERY 12 HOURS
Status: DISCONTINUED | OUTPATIENT
Start: 2025-02-06 | End: 2025-02-09

## 2025-02-06 RX ORDER — SODIUM CHLORIDE 0.9 % (FLUSH) 0.9 %
10 SYRINGE (ML) INJECTION
Status: DISCONTINUED | OUTPATIENT
Start: 2025-02-06 | End: 2025-02-10 | Stop reason: HOSPADM

## 2025-02-06 RX ORDER — ONDANSETRON HYDROCHLORIDE 2 MG/ML
4 INJECTION, SOLUTION INTRAVENOUS EVERY 8 HOURS PRN
Status: DISCONTINUED | OUTPATIENT
Start: 2025-02-06 | End: 2025-02-10 | Stop reason: HOSPADM

## 2025-02-06 RX ADMIN — ENOXAPARIN SODIUM 40 MG: 40 INJECTION SUBCUTANEOUS at 08:02

## 2025-02-06 RX ADMIN — LORAZEPAM 1 MG: 2 INJECTION, SOLUTION INTRAMUSCULAR; INTRAVENOUS at 06:02

## 2025-02-06 RX ADMIN — LORAZEPAM 2 MG: 2 INJECTION, SOLUTION INTRAMUSCULAR; INTRAVENOUS at 09:02

## 2025-02-06 RX ADMIN — HALOPERIDOL LACTATE 5 MG: 5 INJECTION, SOLUTION INTRAMUSCULAR at 09:02

## 2025-02-06 RX ADMIN — FUROSEMIDE 80 MG: 10 INJECTION, SOLUTION INTRAMUSCULAR; INTRAVENOUS at 06:02

## 2025-02-06 RX ADMIN — NITROGLYCERIN 2 INCH: 20 OINTMENT TOPICAL at 06:02

## 2025-02-06 RX ADMIN — FUROSEMIDE 40 MG: 10 INJECTION, SOLUTION INTRAMUSCULAR; INTRAVENOUS at 08:02

## 2025-02-06 NOTE — TELEPHONE ENCOUNTER
"Patient was seen in office this week for multiple complaints and concerns one main one being shortness of breath and fatigue. Bette ordered lab work and chest x ray. Patient did labs and xray today and per Bette "please notiffy patient his cxr is showing pleural effusion and BNP is >24295. needs to go to hospital to be diuresed" I called pt and he stated he was "fine" and he was just going to "rest on the couch" I keep informing patient that is was extremely important for him to go to the ED. Patient still was not taking me seriously so Bette got on the phone and spoke with the patient. He will go to ED and if someone can't bring him he will call the ambulance to come get him.    Patient was still short of breath over the phone and kept telling me he was tired.     Will be on the lookout for patients records from ED  "

## 2025-02-07 LAB
ANION GAP SERPL CALC-SCNC: 7 MEQ/L (ref 2–13)
BUN SERPL-MCNC: 28 MG/DL (ref 7–20)
CALCIUM SERPL-MCNC: 9.3 MG/DL (ref 8.4–10.2)
CHLORIDE SERPL-SCNC: 101 MMOL/L (ref 98–110)
CO2 SERPL-SCNC: 29 MMOL/L (ref 21–32)
CREAT SERPL-MCNC: 1.14 MG/DL (ref 0.66–1.25)
CREAT/UREA NIT SERPL: 25 (ref 12–20)
GFR SERPLBLD CREATININE-BSD FMLA CKD-EPI: 70 ML/MIN/1.73/M2
GLUCOSE SERPL-MCNC: 121 MG/DL (ref 70–110)
GLUCOSE SERPL-MCNC: 58 MG/DL (ref 70–115)
MAGNESIUM SERPL-MCNC: 2 MG/DL (ref 1.8–2.4)
OHS QRS DURATION: 140 MS
OHS QTC CALCULATION: 497 MS
POCT GLUCOSE: 121 MG/DL (ref 70–110)
POTASSIUM SERPL-SCNC: 4 MMOL/L (ref 3.5–5.1)
SODIUM SERPL-SCNC: 137 MMOL/L (ref 136–145)
TROPONIN I SERPL-MCNC: 0.01 NG/ML (ref 0–0.03)

## 2025-02-07 PROCEDURE — 96376 TX/PRO/DX INJ SAME DRUG ADON: CPT

## 2025-02-07 PROCEDURE — 80048 BASIC METABOLIC PNL TOTAL CA: CPT

## 2025-02-07 PROCEDURE — 83735 ASSAY OF MAGNESIUM: CPT

## 2025-02-07 PROCEDURE — 84484 ASSAY OF TROPONIN QUANT: CPT | Performed by: INTERNAL MEDICINE

## 2025-02-07 PROCEDURE — 94761 N-INVAS EAR/PLS OXIMETRY MLT: CPT

## 2025-02-07 PROCEDURE — 36415 COLL VENOUS BLD VENIPUNCTURE: CPT | Mod: 91

## 2025-02-07 PROCEDURE — 63600175 PHARM REV CODE 636 W HCPCS

## 2025-02-07 PROCEDURE — 36415 COLL VENOUS BLD VENIPUNCTURE: CPT | Performed by: INTERNAL MEDICINE

## 2025-02-07 PROCEDURE — 63600175 PHARM REV CODE 636 W HCPCS: Performed by: INTERNAL MEDICINE

## 2025-02-07 PROCEDURE — 21400001 HC TELEMETRY ROOM

## 2025-02-07 PROCEDURE — 25000003 PHARM REV CODE 250

## 2025-02-07 RX ADMIN — BUPROPION HYDROCHLORIDE 75 MG: 75 TABLET, FILM COATED ORAL at 08:02

## 2025-02-07 RX ADMIN — ATORVASTATIN CALCIUM 40 MG: 40 TABLET, FILM COATED ORAL at 08:02

## 2025-02-07 RX ADMIN — LORAZEPAM 2 MG: 2 INJECTION, SOLUTION INTRAMUSCULAR; INTRAVENOUS at 01:02

## 2025-02-07 RX ADMIN — ENOXAPARIN SODIUM 40 MG: 40 INJECTION SUBCUTANEOUS at 04:02

## 2025-02-07 RX ADMIN — ASPIRIN 81 MG 81 MG: 81 TABLET ORAL at 08:02

## 2025-02-07 RX ADMIN — FUROSEMIDE 40 MG: 10 INJECTION, SOLUTION INTRAMUSCULAR; INTRAVENOUS at 08:02

## 2025-02-07 RX ADMIN — PANTOPRAZOLE SODIUM 40 MG: 40 TABLET, DELAYED RELEASE ORAL at 08:02

## 2025-02-07 RX ADMIN — METOPROLOL SUCCINATE 25 MG: 25 TABLET, EXTENDED RELEASE ORAL at 08:02

## 2025-02-07 RX ADMIN — LISINOPRIL 20 MG: 10 TABLET ORAL at 08:02
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
## 2025-02-07 NOTE — NURSING
Pt began to become very combative. Dr. Lei was notified and order 4 point restraints. Will continue to monitor.

## 2025-02-07 NOTE — PLAN OF CARE
02/07/25 1350   Discharge Assessment   Assessment Type Discharge Planning Assessment   Confirmed/corrected address, phone number and insurance Yes   Confirmed Demographics Correct on Facesheet   Source of Information health record;family   If unable to respond/provide information was family/caregiver contacted? Yes   Contact Name/Number Nydia Lee and Mimi Lee-Daughters  [phone], [phone]   When was your last doctors appointment? 02/05/25   Communicated CHASIDY with patient/caregiver Date not available/Unable to determine   Reason For Admission CHF   People in Home significant other   Facility Arrived From: Home   Do you expect to return to your current living situation? Yes   Prior to hospitilization cognitive status: Alert/Oriented   Current cognitive status: Unable to Assess;Coma/Sedated/Intubated   Walking or Climbing Stairs Difficulty no   Dressing/Bathing Difficulty no   Equipment Currently Used at Home none   Readmission within 30 days? No   Patient currently being followed by outpatient case management? No   Do you currently have service(s) that help you manage your care at home? No   Do you take prescription medications? Yes   Do you have prescription coverage? Yes   Coverage [employer] Managed Medicare Dual Complete   Do you have any problems affording any of your prescribed medications? No   Is the patient taking medications as prescribed?   (Unsure)   Who is going to help you get home at discharge? Daughter   How do you get to doctors appointments? family or friend will provide   Are you on dialysis? No   Do you take coumadin? No   Discharge Plan A Home   Discharge Plan B Home Health   DME Needed Upon Discharge  none   Discharge Plan discussed with: Patient;Adult children   Transition of Care Barriers Does not adhere to care plan;Substance Abuse;Transportation   OTHER   Name(s) of People in Home SO     Discussed discharge plan with daughter Mimi and Nydia Lee.  Both are agreeable to  home health at discharge if the physician feels it is necessary.  Substance abuse navigator will be contacted when patient can participate with conversation.

## 2025-02-07 NOTE — NURSING
Bs 58 on am labs. OJ with a pack of sugar given to patient. Patient able to wake up and drink oj.
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
## 2025-02-07 NOTE — NURSING
Pt was showing signs of anxiety and agitation. Dr. Lei was notified of the pt status and order 5mg of haloperidol IM and Lorazepam 2mg Iv Q4 PRN.

## 2025-02-07 NOTE — PLAN OF CARE
Problem: Adult Inpatient Plan of Care  Goal: Plan of Care Review  Outcome: Progressing  Goal: Patient-Specific Goal (Individualized)  Outcome: Progressing  Goal: Absence of Hospital-Acquired Illness or Injury  Outcome: Progressing  Goal: Optimal Comfort and Wellbeing  Outcome: Progressing  Goal: Readiness for Transition of Care  Outcome: Progressing     Problem: Fall Injury Risk  Goal: Absence of Fall and Fall-Related Injury  Outcome: Progressing     Problem: Restraint, Nonviolent  Goal: Absence of Harm or Injury  Outcome: Progressing     Problem: Heart Failure  Goal: Optimal Coping  Outcome: Progressing  Goal: Optimal Cardiac Output  Outcome: Progressing  Goal: Stable Heart Rate and Rhythm  Outcome: Progressing  Goal: Optimal Functional Ability  Outcome: Progressing  Goal: Fluid and Electrolyte Balance  Outcome: Progressing  Goal: Improved Oral Intake  Outcome: Progressing  Goal: Effective Oxygenation and Ventilation  Outcome: Progressing  Goal: Effective Breathing Pattern During Sleep  Outcome: Progressing

## 2025-02-07 NOTE — PROGRESS NOTES
Hospital Medicine  Progress Note    Patient Name: William South  MRN: [id number]  Status: OP- Observation   Admission Date: 2/6/2025  Length of Stay: 0  Date of Service: 02/07/2025       CC: hospital follow-up for        SUBJECTIVE   69 year old man with a history of poly substance abuse, including crack and cocaine. He has the history of congestive heart failure with an EF of 25%. He was recently discharged from [facility] with similar symptoms. Patient was seen today by his primary care physician and was sent to the emergency department for symptoms of CHF. Patient is unable to give me any history at this time because hes gotten Ativan. He doesnt been to having done crack cocaine toda he got 80 of Lasix in the emergency department and short of breath. We are admitting for CHF exacerbation secondary to poly substance abuse.     2/7/2025  Sob better today    Today: Patient seen and examined at bedside, and chart reviewed.       MEDICATIONS   Scheduled   aspirin  81 mg Oral QAM    atorvastatin  40 mg Oral Daily    buPROPion  75 mg Oral BID    enoxparin  40 mg Subcutaneous Daily    furosemide (LASIX) injection  40 mg Intravenous Q12H    lisinopriL  20 mg Oral Daily    metoprolol succinate  25 mg Oral QAM    pantoprazole  40 mg Oral QAM     Continuous Infusions        PHYSICAL EXAM   VITALS: T 97.3 °F (36.3 °C)   BP (!) 131/102   P 97   RR 20   O2 98 %    GENERAL: Awake and in NAD  LUNGS: CTA B/L  CVS: Normal rate  GI/: Soft, NT, bowel sounds positive.  EXTREMITIES: No peripheral edema  NEURO: AAOx3  PSYCH: Cooperative      LABS   CBC  Recent Labs     02/06/25  1309 02/06/25  1825   WBC 7.77 6.94   RBC 4.94 4.61   HGB 15.1 14.6   HCT 46.6 42.7   MCV 94.3 92.6   MCH 30.6 31.7   MCHC 32.4 34.2   RDW 15.0 14.9    235     CHEM  Recent Labs     02/06/25  1309 02/06/25  1825 02/07/25  0449   * 133* 137   K 4.9 4.6 4.0   CO2 19* 22 29   BUN 31* 31* 28*   CREATININE 1.27* 1.25 1.14   GLUCOSE 94 101  58*   CALCIUM 9.1 8.7 9.3   MG  --   --  2.00   ALBUMIN 3.7 3.7  --    GLOBULIN 2.5 2.6  --    ALKPHOS 77 80  --    * 127*  --    AST 90* 93*  --    BILITOT 1.4* 1.4*  --          MICROBIOLOGY     Microbiology Results (last 7 days)       ** No results found for the last 168 hours. **              DIAGNOSTICS   X-Ray Chest AP Portable  Narrative: EXAMINATION:  XR CHEST AP PORTABLE    CLINICAL HISTORY:  CHF;    TECHNIQUE:  Single frontal view of the chest was performed.    COMPARISON:  None    FINDINGS:  Moderate cardiomegaly is present with mild vascular congestion.  A hazy density is present in the right infrahilar region with mild blunting of the right CP angle suspicious for airspace disease in a small right-sided pleural effusion.    Bones are intact.  Impression: Moderate cardiomegaly with mild vascular congestion.  Mild haziness in the right infrahilar region associated with mild blunting of the right CP angle suspicious for asymmetric pulmonary edema versus airspace disease and a small right-sided pleural effusion.  There has been some progression since the study of 02/06/2025 at 13:21.    Electronically signed by: Yamil Najera  Date:    02/06/2025  Time:    19:22  X-Ray Chest PA And Lateral  Narrative: EXAMINATION:  XR CHEST PA AND LATERAL    CLINICAL HISTORY:  dyspnea; Chronic diastolic (congestive) heart failure    COMPARISON:  None    FINDINGS:  PA and lateral chest radiographs are provided for evaluation.    Cardiac silhouette is enlarged in size.    Hazy patchy opacities are seen within the posterior right lung base with questionable small right-sided pleural effusion.    No acute osseous findings.  Impression: 1. Cardiomegaly.  2. Hazy patchy opacities in the posterior right lung base concerning for airspace disease with possible small pleural effusion.    Electronically signed by: Thai Gold  Date:    02/06/2025  Time:    13:35    Assessment/Plan:            Active Diagnoses:     Diagnosis  Date Noted POA    PRINCIPAL PROBLEM:  Acute on chronic CHF (congestive heart failure) [I50.9] 12/25/2024 Yes    Cocaine abuse [F14.10] 02/05/2025 Yes    Tobacco abuse disorder [Z72.0] 12/21/2024 Yes    Hypertension [I10] 10/15/2024 Yes    Anxiety and depression [F41.9, F32.A] 10/15/2024 Yes      - will continue IV lasix   - Due to mental status unable to  on polysubstance abuse and tobacco  - Reconcile home meds as warranted  - Trend troponins  - Supplemental oxygen as warranted  - Low-salt diet  - Pain, nausea control ongoing                    Yossi Hernández MD  [facility]

## 2025-02-08 LAB
ANION GAP SERPL CALC-SCNC: 6 MEQ/L (ref 2–13)
BUN SERPL-MCNC: 22 MG/DL (ref 7–20)
CALCIUM SERPL-MCNC: 8.8 MG/DL (ref 8.4–10.2)
CHLORIDE SERPL-SCNC: 100 MMOL/L (ref 98–110)
CO2 SERPL-SCNC: 27 MMOL/L (ref 21–32)
CREAT SERPL-MCNC: 1.1 MG/DL (ref 0.66–1.25)
CREAT/UREA NIT SERPL: 20 (ref 12–20)
GFR SERPLBLD CREATININE-BSD FMLA CKD-EPI: 73 ML/MIN/1.73/M2
GLUCOSE SERPL-MCNC: 57 MG/DL (ref 70–115)
MAGNESIUM SERPL-MCNC: 2.1 MG/DL (ref 1.8–2.4)
POCT GLUCOSE: 104 MG/DL (ref 70–110)
POTASSIUM SERPL-SCNC: 3.9 MMOL/L (ref 3.5–5.1)
SODIUM SERPL-SCNC: 133 MMOL/L (ref 136–145)

## 2025-02-08 PROCEDURE — 80048 BASIC METABOLIC PNL TOTAL CA: CPT | Performed by: FAMILY MEDICINE

## 2025-02-08 PROCEDURE — 83735 ASSAY OF MAGNESIUM: CPT | Performed by: FAMILY MEDICINE

## 2025-02-08 PROCEDURE — 94761 N-INVAS EAR/PLS OXIMETRY MLT: CPT

## 2025-02-08 PROCEDURE — 63600175 PHARM REV CODE 636 W HCPCS

## 2025-02-08 PROCEDURE — 25000003 PHARM REV CODE 250: Performed by: INTERNAL MEDICINE

## 2025-02-08 PROCEDURE — 25000003 PHARM REV CODE 250

## 2025-02-08 PROCEDURE — 36415 COLL VENOUS BLD VENIPUNCTURE: CPT | Performed by: FAMILY MEDICINE

## 2025-02-08 PROCEDURE — 21400001 HC TELEMETRY ROOM

## 2025-02-08 RX ORDER — BACLOFEN 5 MG/1
5 TABLET ORAL ONCE
Status: COMPLETED | OUTPATIENT
Start: 2025-02-08 | End: 2025-02-08

## 2025-02-08 RX ADMIN — ASPIRIN 81 MG 81 MG: 81 TABLET ORAL at 06:02

## 2025-02-08 RX ADMIN — ENOXAPARIN SODIUM 40 MG: 40 INJECTION SUBCUTANEOUS at 05:02

## 2025-02-08 RX ADMIN — LISINOPRIL 20 MG: 10 TABLET ORAL at 08:02

## 2025-02-08 RX ADMIN — FUROSEMIDE 40 MG: 10 INJECTION, SOLUTION INTRAMUSCULAR; INTRAVENOUS at 08:02

## 2025-02-08 RX ADMIN — BACLOFEN 5 MG: 5 TABLET ORAL at 08:02

## 2025-02-08 RX ADMIN — BUPROPION HYDROCHLORIDE 75 MG: 75 TABLET, FILM COATED ORAL at 08:02

## 2025-02-08 RX ADMIN — ATORVASTATIN CALCIUM 40 MG: 40 TABLET, FILM COATED ORAL at 08:02

## 2025-02-08 RX ADMIN — METOPROLOL SUCCINATE 25 MG: 25 TABLET, EXTENDED RELEASE ORAL at 06:02

## 2025-02-08 RX ADMIN — PANTOPRAZOLE SODIUM 40 MG: 40 TABLET, DELAYED RELEASE ORAL at 06:02

## 2025-02-08 NOTE — PROGRESS NOTES
Hospital Medicine  Progress Note    Patient Name: William South  MRN: [id number]  Status: IP- Inpatient   Admission Date: 2/6/2025  Length of Stay: 1  Date of Service: 02/08/2025       CC: hospital follow-up for        SUBJECTIVE   69 year old man with a history of poly substance abuse, including crack and cocaine. He has the history of congestive heart failure with an EF of 25%. He was recently discharged from [facility] with similar symptoms. Patient was seen today by his primary care physician and was sent to the emergency department for symptoms of CHF. Patient is unable to give me any history at this time because hes gotten Ativan. He doesnt been to having done crack cocaine toda he got 80 of Lasix in the emergency department and short of breath. We are admitting for CHF exacerbation secondary to poly substance abuse.      2/7/2025  Sob better today    2/8/2025  Mental status imrpoved   Lab stable  Diuresing well       Today: Patient seen and examined at bedside, and chart reviewed.       MEDICATIONS   Scheduled   aspirin  81 mg Oral QAM    atorvastatin  40 mg Oral Daily    buPROPion  75 mg Oral BID    enoxparin  40 mg Subcutaneous Daily    furosemide (LASIX) injection  40 mg Intravenous Q12H    lisinopriL  20 mg Oral Daily    metoprolol succinate  25 mg Oral QAM    pantoprazole  40 mg Oral QAM     Continuous Infusions        PHYSICAL EXAM   VITALS: T 97.8 °F (36.6 °C)   BP 95/69   P 80   RR 18   O2 97 %    GENERAL: Awake and in NAD  LUNGS: CTA B/L  CVS: Normal rate  GI/: Soft, NT, bowel sounds positive.  EXTREMITIES: No peripheral edema  NEURO: AAOx3  PSYCH: Cooperative      LABS   CBC  Recent Labs     02/06/25  1309 02/06/25  1825   WBC 7.77 6.94   RBC 4.94 4.61   HGB 15.1 14.6   HCT 46.6 42.7   MCV 94.3 92.6   MCH 30.6 31.7   MCHC 32.4 34.2   RDW 15.0 14.9    235     CHEM  Recent Labs     02/06/25  1309 02/06/25  1825 02/07/25  0449 02/08/25  0520   * 133* 137 133*   K 4.9 4.6 4.0  3.9   CO2 19* 22 29 27   BUN 31* 31* 28* 22*   CREATININE 1.27* 1.25 1.14 1.10   GLUCOSE 94 101 58* 57*   CALCIUM 9.1 8.7 9.3 8.8   MG  --   --  2.00 2.10   ALBUMIN 3.7 3.7  --   --    GLOBULIN 2.5 2.6  --   --    ALKPHOS 77 80  --   --    * 127*  --   --    AST 90* 93*  --   --    BILITOT 1.4* 1.4*  --   --          MICROBIOLOGY     Microbiology Results (last 7 days)       ** No results found for the last 168 hours. **              DIAGNOSTICS   X-Ray Chest AP Portable  Narrative: EXAMINATION:  XR CHEST AP PORTABLE    CLINICAL HISTORY:  CHF;    TECHNIQUE:  Single frontal view of the chest was performed.    COMPARISON:  None    FINDINGS:  Moderate cardiomegaly is present with mild vascular congestion.  A hazy density is present in the right infrahilar region with mild blunting of the right CP angle suspicious for airspace disease in a small right-sided pleural effusion.    Bones are intact.  Impression: Moderate cardiomegaly with mild vascular congestion.  Mild haziness in the right infrahilar region associated with mild blunting of the right CP angle suspicious for asymmetric pulmonary edema versus airspace disease and a small right-sided pleural effusion.  There has been some progression since the study of 02/06/2025 at 13:21.    Electronically signed by: Yamil Najera  Date:    02/06/2025  Time:    19:22  X-Ray Chest PA And Lateral  Narrative: EXAMINATION:  XR CHEST PA AND LATERAL    CLINICAL HISTORY:  dyspnea; Chronic diastolic (congestive) heart failure    COMPARISON:  None    FINDINGS:  PA and lateral chest radiographs are provided for evaluation.    Cardiac silhouette is enlarged in size.    Hazy patchy opacities are seen within the posterior right lung base with questionable small right-sided pleural effusion.    No acute osseous findings.  Impression: 1. Cardiomegaly.  2. Hazy patchy opacities in the posterior right lung base concerning for airspace disease with possible small pleural  effusion.    Electronically signed by: Thai Gold  Date:    02/06/2025  Time:    13:35      Assessment/Plan:                Active Diagnoses:     Diagnosis Date Noted POA    PRINCIPAL PROBLEM:  Acute on chronic CHF (congestive heart failure) [I50.9] 12/25/2024 Yes    Cocaine abuse [F14.10] 02/05/2025 Yes    Tobacco abuse disorder [Z72.0] 12/21/2024 Yes    Hypertension [I10] 10/15/2024 Yes    Anxiety and depression [F41.9, F32.A] 10/15/2024 Yes      - will continue IV lasix   - Due to mental status unable to  on polysubstance abuse and tobacco  - Reconcile home meds as warranted  - Trend troponins  - Supplemental oxygen as warranted  - Low-salt diet  - Pain, nausea control ongoing               Yossi Hernández MD  [facility]

## 2025-02-09 PROCEDURE — 25000003 PHARM REV CODE 250

## 2025-02-09 PROCEDURE — 94761 N-INVAS EAR/PLS OXIMETRY MLT: CPT

## 2025-02-09 PROCEDURE — 21400001 HC TELEMETRY ROOM

## 2025-02-09 PROCEDURE — 63600175 PHARM REV CODE 636 W HCPCS

## 2025-02-09 RX ADMIN — BUPROPION HYDROCHLORIDE 75 MG: 75 TABLET, FILM COATED ORAL at 08:02

## 2025-02-09 RX ADMIN — METOPROLOL SUCCINATE 25 MG: 25 TABLET, EXTENDED RELEASE ORAL at 06:02

## 2025-02-09 RX ADMIN — FUROSEMIDE 40 MG: 10 INJECTION, SOLUTION INTRAMUSCULAR; INTRAVENOUS at 08:02

## 2025-02-09 RX ADMIN — FUROSEMIDE 40 MG: 10 INJECTION, SOLUTION INTRAMUSCULAR; INTRAVENOUS at 09:02

## 2025-02-09 RX ADMIN — LISINOPRIL 20 MG: 10 TABLET ORAL at 08:02

## 2025-02-09 RX ADMIN — ASPIRIN 81 MG 81 MG: 81 TABLET ORAL at 06:02

## 2025-02-09 RX ADMIN — PANTOPRAZOLE SODIUM 40 MG: 40 TABLET, DELAYED RELEASE ORAL at 06:02

## 2025-02-09 RX ADMIN — ATORVASTATIN CALCIUM 40 MG: 40 TABLET, FILM COATED ORAL at 08:02

## 2025-02-09 RX ADMIN — ENOXAPARIN SODIUM 40 MG: 40 INJECTION SUBCUTANEOUS at 04:02

## 2025-02-10 VITALS
WEIGHT: 128.06 LBS | HEIGHT: 66 IN | BODY MASS INDEX: 20.58 KG/M2 | TEMPERATURE: 98 F | SYSTOLIC BLOOD PRESSURE: 115 MMHG | HEART RATE: 88 BPM | OXYGEN SATURATION: 100 % | RESPIRATION RATE: 18 BRPM | DIASTOLIC BLOOD PRESSURE: 65 MMHG

## 2025-02-10 PROBLEM — E87.1 HYPONATREMIA: Status: ACTIVE | Noted: 2025-02-10

## 2025-02-10 LAB
ANION GAP SERPL CALC-SCNC: 6 MEQ/L (ref 2–13)
BUN SERPL-MCNC: 24 MG/DL (ref 7–20)
CALCIUM SERPL-MCNC: 8.9 MG/DL (ref 8.4–10.2)
CHLORIDE SERPL-SCNC: 95 MMOL/L (ref 98–110)
CO2 SERPL-SCNC: 32 MMOL/L (ref 21–32)
CREAT SERPL-MCNC: 1.39 MG/DL (ref 0.66–1.25)
CREAT/UREA NIT SERPL: 17 (ref 12–20)
GFR SERPLBLD CREATININE-BSD FMLA CKD-EPI: 55 ML/MIN/1.73/M2
GLUCOSE SERPL-MCNC: 64 MG/DL (ref 70–115)
POCT GLUCOSE: 82 MG/DL (ref 70–110)
POTASSIUM SERPL-SCNC: 4 MMOL/L (ref 3.5–5.1)
SODIUM SERPL-SCNC: 133 MMOL/L (ref 136–145)

## 2025-02-10 PROCEDURE — 25000003 PHARM REV CODE 250

## 2025-02-10 PROCEDURE — 94761 N-INVAS EAR/PLS OXIMETRY MLT: CPT

## 2025-02-10 PROCEDURE — 36415 COLL VENOUS BLD VENIPUNCTURE: CPT | Performed by: FAMILY MEDICINE

## 2025-02-10 PROCEDURE — 80048 BASIC METABOLIC PNL TOTAL CA: CPT | Performed by: FAMILY MEDICINE

## 2025-02-10 RX ORDER — FUROSEMIDE 20 MG/1
20 TABLET ORAL 2 TIMES DAILY
Qty: 60 TABLET | Refills: 11 | Status: SHIPPED | OUTPATIENT
Start: 2025-02-10 | End: 2026-02-10

## 2025-02-10 RX ADMIN — BUPROPION HYDROCHLORIDE 75 MG: 75 TABLET, FILM COATED ORAL at 09:02

## 2025-02-10 RX ADMIN — ATORVASTATIN CALCIUM 40 MG: 40 TABLET, FILM COATED ORAL at 09:02

## 2025-02-10 RX ADMIN — METOPROLOL SUCCINATE 25 MG: 25 TABLET, EXTENDED RELEASE ORAL at 06:02

## 2025-02-10 RX ADMIN — ASPIRIN 81 MG 81 MG: 81 TABLET ORAL at 06:02

## 2025-02-10 RX ADMIN — LISINOPRIL 20 MG: 10 TABLET ORAL at 09:02

## 2025-02-10 RX ADMIN — PANTOPRAZOLE SODIUM 40 MG: 40 TABLET, DELAYED RELEASE ORAL at 06:02

## 2025-02-10 NOTE — DISCHARGE SUMMARY
[facility]/Surg  [facility] Medicine  Discharge Summary      Patient Name: William South  MRN: [id number]  [facility]: [id number]  Patient Class: IP- Inpatient  Admission Date: 2/6/2025  Hospital Length of Stay: 3 days  Discharge Date and Time: No discharge date for patient encounter.  Attending Physician: Yossi Hernández MD   Discharging Provider: Racquel Soares MD  Primary Care Provider: Bette Soliz NP    Primary Care Team: Networked reference to record PCT     HPI:         SUBJECTIVE   69 year old man with a history of poly substance abuse, including crack and cocaine. He has the history of congestive heart failure with an EF of 25%. He was recently discharged from [facility] with similar symptoms. Patient was seen today by his primary care physician and was sent to the emergency department for symptoms of CHF. Patient is unable to give me any history at this time because hes gotten Ativan. He doesnt been to having done crack cocaine toda he got 80 of Lasix in the emergency department and short of breath. We are admitting for CHF exacerbation secondary to poly substance abuse.            * No surgery found *      Hospital Course:     2/7/2025  Sob better today     2/8/2025  Mental status imrpoved   Lab stable  Diuresing well      2/9/2025  Sob improving      Today: Patient seen and examined at bedside, and chart reviewed.     02/10/2025 DISCAHRGE SUMMARY pt improved, back to baseline he is agreeable to home health and will be d/c home with lasix 20mg bid, f/u with [facility] and PCP in 1-2 week for further adjustments.     Goals of Care Treatment Preferences:  Code Status: Full Code      SDOH Screening:  The patient was unable to be screened for utility difficulties, food insecurity, transport difficulties, housing insecurity, and interpersonal safety, so no concerns could be identified this admission.     Consults:   Consults (From admission, onward)          Status Ordering Provider      "Inpatient virtual consult to Hospital Medicine  Once        Provider:  Juan Lei MD    Acknowledged MAREK GALE            No notes have been filed under this hospital service.  Service: Hospital Medicine    Final Active Diagnoses:    Diagnosis Date Noted POA    PRINCIPAL PROBLEM:  CHF (congestive heart failure) [I50.9] 12/25/2024 Yes    Hyponatremia [E87.1] 02/10/2025 Yes    Cocaine abuse [F14.10] 02/05/2025 Yes    Tobacco abuse disorder [Z72.0] 12/21/2024 Yes    Hypertension [I10] 10/15/2024 Yes    Anxiety and depression [F41.9, F32.A] 10/15/2024 Yes      Problems Resolved During this Admission:       Discharged Condition: good    Disposition: Home or Self Care    Follow Up:   Follow-up Information       Bette Soliz NP Follow up.    Specialty: Family Medicine  Contact information:  [address] [address] [phone]               Bertrand Rooney MD Follow up.    Specialty: Cardiology  Contact information:  [address]  [address]  [place] [address] [phone]                           Patient Instructions:      Activity as tolerated       Significant Diagnostic Studies: Labs: CMP   Recent Labs   Lab 02/10/25  0433   *   K 4.0   CL 95*   CO2 32   BUN 24*   CREATININE 1.39*   CALCIUM 8.9    and CBC No results for input(s): "WBC", "HGB", "HCT", "PLT" in the last 48 hours.    Pending Diagnostic Studies:       None           Medications:  Reconciled Home Medications:      Medication List        START taking these medications      furosemide 20 MG tablet  Commonly known as: LASIX  Take 1 tablet (20 mg total) by mouth 2 (two) times daily.            CONTINUE taking these medications      aspirin 81 MG Chew  Take 1 tablet by mouth every morning.     atorvastatin 40 MG tablet  Commonly known as: LIPITOR  Take 40 mg by mouth once daily.     buPROPion 150 MG TB24 tablet  Commonly known as: WELLBUTRIN XL  Take 1 tablet (150 mg total) by mouth once daily.     EScitalopram oxalate 10 MG " tablet  Commonly known as: LEXAPRO  Take 1 tablet (10 mg total) by mouth once daily.     lisinopriL 20 MG tablet  Commonly known as: PRINIVIL,ZESTRIL  Take 1 tablet (20 mg total) by mouth once daily.     meloxicam 7.5 MG tablet  Commonly known as: MOBIC  Take 1 tablet (7.5 mg total) by mouth once daily.     metoprolol succinate 25 MG 24 hr tablet  Commonly known as: TOPROL-XL  Take 1 tablet by mouth every morning.     pantoprazole 40 MG tablet  Commonly known as: PROTONIX  Take 1 tablet by mouth every morning.              Indwelling Lines/Drains at time of discharge:   Lines/Drains/Airways       None                   Time spent on the discharge of patient: 37 minutes     Patient Screened for food insecurity, housing instability, transportation needs, utility difficulties, and interpersonal safety.  No needs identified    Physical Exam  Vitals and nursing note reviewed.   Constitutional:       General: He is not in acute distress.     Appearance: Normal appearance. He is normal weight. He is not ill-appearing.   HENT:      Head: Normocephalic and atraumatic.   Cardiovascular:      Rate and Rhythm: Normal rate and regular rhythm.      Pulses: Normal pulses.      Heart sounds: Normal heart sounds.   Pulmonary:      Effort: Pulmonary effort is normal.      Breath sounds: Normal breath sounds.   Abdominal:      General: Abdomen is flat. Bowel sounds are normal.      Palpations: Abdomen is soft.   Skin:     General: Skin is warm and dry.      Findings: No erythema or rash.   Neurological:      Mental Status: He is alert.   Psychiatric:      Comments: I had a face to face encounter with this patient prior to discharging     \  Racquel Soares MD  Department of Hospital Medicine  [facility]

## 2025-02-10 NOTE — PLAN OF CARE
02/10/25 1308   Medicare Message   Important Message from Medicare regarding Discharge Appeal Rights Given to patient/caregiver;Other (comments);Explained to patient/caregiver;Signed/date by patient/caregiver  (Copy given)   Date IMM was signed 02/09/25

## 2025-02-10 NOTE — NURSING
Magdalena with [facility] went in and spoke with patient about drug rehab and other options.

## 2025-02-10 NOTE — PLAN OF CARE
Patient agreeable to home health at discharge.  Patient has no preference, patient signed CCP for no preference.

## 2025-02-10 NOTE — HOSPITAL COURSE
2/7/2025  Sob better today     2/8/2025  Mental status imrpoved   Lab stable  Diuresing well      2/9/2025  Sob improving      Today: Patient seen and examined at bedside, and chart reviewed.     02/10/2025 DISCAHRGE SUMMARY pt improved, back to baseline he is agreeable to home health and will be d/c home with lasix 20mg bid, f/u with [facility] and PCP in 1-2 week for further adjustments.

## 2025-02-10 NOTE — PLAN OF CARE
Physician ordered to discharge patient home with home health care services. Referral made to [facility] per phone/fax, spoke with Cindy. [facility] nurse to admit patient tomorrow,

## 2025-02-10 NOTE — PROGRESS NOTES
"Hospital Medicine  Progress Note    Patient Name: William South  MRN: [id number]  Status: IP- Inpatient   Admission Date: 2/6/2025  Length of Stay: 2  Date of Service: 02/09/2025       CC: hospital follow-up for        SUBJECTIVE   69 year old man with a history of poly substance abuse, including crack and cocaine. He has the history of congestive heart failure with an EF of 25%. He was recently discharged from [facility] with similar symptoms. Patient was seen today by his primary care physician and was sent to the emergency department for symptoms of CHF. Patient is unable to give me any history at this time because hes gotten Ativan. He doesnt been to having done crack cocaine toda he got 80 of Lasix in the emergency department and short of breath. We are admitting for CHF exacerbation secondary to poly substance abuse.      2/7/2025  Sob better today     2/8/2025  Mental status imrpoved   Lab stable  Diuresing well     2/9/2025  Sob improving     Today: Patient seen and examined at bedside, and chart reviewed.       MEDICATIONS   Scheduled   aspirin  81 mg Oral QAM    atorvastatin  40 mg Oral Daily    buPROPion  75 mg Oral BID    enoxparin  40 mg Subcutaneous Daily    lisinopriL  20 mg Oral Daily    metoprolol succinate  25 mg Oral QAM    pantoprazole  40 mg Oral QAM     Continuous Infusions        PHYSICAL EXAM   VITALS: T 98.2 °F (36.8 °C)   /81   P 92   RR 18   O2 98 %    GENERAL: Awake and in NAD  LUNGS: CTA B/L  CVS: Normal rate  GI/: Soft, NT, bowel sounds positive.  EXTREMITIES: No peripheral edema  NEURO: AAOx3  PSYCH: Cooperative      LABS   CBC  No results for input(s): "WBC", "RBC", "HGB", "HCT", "MCV", "MCH", "MCHC", "RDW", "PLT", "RETIC", "ESR" in the last 72 hours.  CHEM  Recent Labs     02/07/25  0449 02/08/25  0520    133*   K 4.0 3.9   CO2 29 27   BUN 28* 22*   CREATININE 1.14 1.10   GLUCOSE 58* 57*   CALCIUM 9.3 8.8   MG 2.00 2.10         MICROBIOLOGY     Microbiology " Results (last 7 days)       ** No results found for the last 168 hours. **              DIAGNOSTICS   X-Ray Chest AP Portable  Narrative: EXAMINATION:  XR CHEST AP PORTABLE    CLINICAL HISTORY:  CHF;    TECHNIQUE:  Single frontal view of the chest was performed.    COMPARISON:  None    FINDINGS:  Moderate cardiomegaly is present with mild vascular congestion.  A hazy density is present in the right infrahilar region with mild blunting of the right CP angle suspicious for airspace disease in a small right-sided pleural effusion.    Bones are intact.  Impression: Moderate cardiomegaly with mild vascular congestion.  Mild haziness in the right infrahilar region associated with mild blunting of the right CP angle suspicious for asymmetric pulmonary edema versus airspace disease and a small right-sided pleural effusion.  There has been some progression since the study of 02/06/2025 at 13:21.    Electronically signed by: Yamil Najera  Date:    02/06/2025  Time:    19:22  X-Ray Chest PA And Lateral  Narrative: EXAMINATION:  XR CHEST PA AND LATERAL    CLINICAL HISTORY:  dyspnea; Chronic diastolic (congestive) heart failure    COMPARISON:  None    FINDINGS:  PA and lateral chest radiographs are provided for evaluation.    Cardiac silhouette is enlarged in size.    Hazy patchy opacities are seen within the posterior right lung base with questionable small right-sided pleural effusion.    No acute osseous findings.  Impression: 1. Cardiomegaly.  2. Hazy patchy opacities in the posterior right lung base concerning for airspace disease with possible small pleural effusion.    Electronically signed by: Thai Gold  Date:    02/06/2025  Time:    13:35      Assessment/Plan:                Active Diagnoses:     Diagnosis Date Noted POA    PRINCIPAL PROBLEM:  Acute on chronic CHF (congestive heart failure) [I50.9] 12/25/2024 Yes    Cocaine abuse [F14.10] 02/05/2025 Yes    Tobacco abuse disorder [Z72.0] 12/21/2024 Yes     Hypertension [I10] 10/15/2024 Yes    Anxiety and depression [F41.9, F32.A] 10/15/2024 Yes      - will continue IV lasix   - Due to mental status unable to  on polysubstance abuse and tobacco  - Reconcile home meds as warranted  - Trend troponins  - Supplemental oxygen as warranted  - Low-salt diet  - Pain, nausea control ongoing               Yossi Hernández MD  [facility] Medicine

## 2025-02-10 NOTE — PLAN OF CARE
Problem: Adult Inpatient Plan of Care  Goal: Plan of Care Review  Outcome: Met  Goal: Patient-Specific Goal (Individualized)  Outcome: Met  Goal: Absence of Hospital-Acquired Illness or Injury  Outcome: Met  Goal: Optimal Comfort and Wellbeing  Outcome: Met  Goal: Readiness for Transition of Care  Outcome: Met     Problem: Fall Injury Risk  Goal: Absence of Fall and Fall-Related Injury  Outcome: Met     Problem: Restraint, Nonviolent  Goal: Absence of Harm or Injury  Outcome: Met     Problem: Heart Failure  Goal: Optimal Coping  Outcome: Met  Goal: Optimal Cardiac Output  Outcome: Met  Goal: Stable Heart Rate and Rhythm  Outcome: Met  Goal: Optimal Functional Ability  Outcome: Met  Goal: Fluid and Electrolyte Balance  Outcome: Met  Goal: Improved Oral Intake  Outcome: Met  Goal: Effective Oxygenation and Ventilation  Outcome: Met  Goal: Effective Breathing Pattern During Sleep  Outcome: Met

## 2025-02-10 NOTE — HPI
SUBJECTIVE   69 year old man with a history of poly substance abuse, including crack and cocaine. He has the history of congestive heart failure with an EF of 25%. He was recently discharged from [facility] with similar symptoms. Patient was seen today by his primary care physician and was sent to the emergency department for symptoms of CHF. Patient is unable to give me any history at this time because hes gotten Ativan. He doesnt been to having done crack cocaine toda he got 80 of Lasix in the emergency department and short of breath. We are admitting for CHF exacerbation secondary to poly substance abuse.

## 2025-02-11 ENCOUNTER — PATIENT OUTREACH (OUTPATIENT)
Dept: ADMINISTRATIVE | Facility: CLINIC | Age: 70
End: 2025-02-11
Payer: MEDICARE

## 2025-02-11 NOTE — PHYSICIAN QUERY
Please further specify the heart failure diagnosis.     Acute on chronic systolic heart failure (HFrEF or HFmrEF)

## 2025-02-11 NOTE — PROGRESS NOTES
2nd attempt C3 nurse attempted to contact William South for a TCC post hospital discharge follow up call. No answer. Voicemail not available. The patient has a scheduled HOSFU appointment with Bette Soliz NP on 2/12 @ 0489.

## 2025-02-11 NOTE — PROGRESS NOTES
C3 nurse attempted to contact William South for a TCC post hospital discharge follow up call. No answer. Left voicemail with callback information. The patient has a scheduled HOSFU appointment with Bette Soliz NP on 2/12 @ 9081.

## 2025-02-12 ENCOUNTER — OFFICE VISIT (OUTPATIENT)
Dept: FAMILY MEDICINE | Facility: CLINIC | Age: 70
End: 2025-02-12
Payer: MEDICARE

## 2025-02-12 VITALS
HEIGHT: 66 IN | TEMPERATURE: 97 F | DIASTOLIC BLOOD PRESSURE: 80 MMHG | WEIGHT: 129 LBS | HEART RATE: 78 BPM | OXYGEN SATURATION: 96 % | SYSTOLIC BLOOD PRESSURE: 126 MMHG | BODY MASS INDEX: 20.73 KG/M2

## 2025-02-12 DIAGNOSIS — I63.81 THALAMIC INFARCTION: ICD-10-CM

## 2025-02-12 DIAGNOSIS — I63.81 BASAL GANGLIA INFARCTION: ICD-10-CM

## 2025-02-12 DIAGNOSIS — Z09 HOSPITAL DISCHARGE FOLLOW-UP: Primary | ICD-10-CM

## 2025-02-12 DIAGNOSIS — Z76.89 ENCOUNTER TO ESTABLISH CARE: ICD-10-CM

## 2025-02-12 PROCEDURE — G0180 MD CERTIFICATION HHA PATIENT: HCPCS | Mod: ,,, | Performed by: NURSE PRACTITIONER

## 2025-02-12 NOTE — PROGRESS NOTES
C3 nurse spoke with William South for a TCC post hospital discharge follow up call. The patient has a scheduled HOSFU appointment with Bette Soliz NP on 2/12 @ 2748.
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
## 2025-02-19 ENCOUNTER — TELEPHONE (OUTPATIENT)
Dept: FAMILY MEDICINE | Facility: CLINIC | Age: 70
End: 2025-02-19

## 2025-02-19 ENCOUNTER — HOSPITAL ENCOUNTER (OUTPATIENT)
Dept: RADIOLOGY | Facility: HOSPITAL | Age: 70
Discharge: HOME | End: 2025-02-19
Attending: NURSE PRACTITIONER
Payer: MEDICARE

## 2025-02-19 DIAGNOSIS — I63.81 BASAL GANGLIA INFARCTION: ICD-10-CM

## 2025-02-19 DIAGNOSIS — I63.81 THALAMIC INFARCTION: ICD-10-CM

## 2025-02-19 NOTE — TELEPHONE ENCOUNTER
I called and spoke with patient and he stated we are all confusing him on what he has where and why he has all these appointments. He stated He has something at the hospital today and I informed him per his chart he has an MRI today at 2 at [facility]. He stated he is going to go to that MRI today and let us know when it is done.

## 2025-02-19 NOTE — TELEPHONE ENCOUNTER
Spoke with patients wife because patient was sleeping. I informed her that if he needs his tooth pulled that he needs to follow up with a dentist. She stated he doesn't have one but she will help get him one. She stated she will have him call us when he is ready to retry the MRI but she doubts he will want too. She stated she will keep us up to date on everything.

## 2025-02-24 ENCOUNTER — TELEPHONE (OUTPATIENT)
Dept: FAMILY MEDICINE | Facility: CLINIC | Age: 70
End: 2025-02-24
Payer: MEDICARE

## 2025-02-26 ENCOUNTER — HOSPITAL ENCOUNTER (INPATIENT)
Facility: HOSPITAL | Age: 70
LOS: 1 days | Discharge: HOME HEALTH | DRG: 175 | End: 2025-02-28
Attending: FAMILY MEDICINE | Admitting: FAMILY MEDICINE
Payer: MEDICARE

## 2025-02-26 DIAGNOSIS — R06.00 DYSPNEA: ICD-10-CM

## 2025-02-26 DIAGNOSIS — I50.9 CHF (CONGESTIVE HEART FAILURE): Primary | ICD-10-CM

## 2025-02-26 DIAGNOSIS — I26.99 PULMONARY EMBOLISM, OTHER, UNSPECIFIED CHRONICITY, UNSPECIFIED WHETHER ACUTE COR PULMONALE PRESENT: ICD-10-CM

## 2025-02-26 DIAGNOSIS — R06.02 SHORTNESS OF BREATH: ICD-10-CM

## 2025-02-26 DIAGNOSIS — R06.02 SOB (SHORTNESS OF BREATH): ICD-10-CM

## 2025-02-26 LAB
ALBUMIN SERPL-MCNC: 3.3 G/DL (ref 3.4–5)
ALBUMIN/GLOB SERPL: 1.2 RATIO
ALP SERPL-CCNC: 95 UNIT/L (ref 50–144)
ALT SERPL-CCNC: 78 UNIT/L (ref 1–45)
AMPHET UR QL SCN: NEGATIVE
ANION GAP SERPL CALC-SCNC: 7 MEQ/L (ref 2–13)
AST SERPL-CCNC: 81 UNIT/L (ref 17–59)
BARBITURATE SCN PRESENT UR: NEGATIVE
BASOPHILS # BLD AUTO: 0.02 X10(3)/MCL (ref 0.01–0.08)
BASOPHILS NFR BLD AUTO: 0.2 % (ref 0.1–1.2)
BENZODIAZ UR QL SCN: NEGATIVE
BILIRUB SERPL-MCNC: 1 MG/DL (ref 0–1)
BNP BLD-MCNC: 9710 PG/ML (ref 0–124.9)
BUN SERPL-MCNC: 18 MG/DL (ref 7–20)
CALCIUM SERPL-MCNC: 8.3 MG/DL (ref 8.4–10.2)
CANNABINOIDS UR QL SCN: NEGATIVE
CHLORIDE SERPL-SCNC: 96 MMOL/L (ref 98–110)
CO2 SERPL-SCNC: 31 MMOL/L (ref 21–32)
COCAINE UR QL SCN: POSITIVE
CREAT SERPL-MCNC: 1.1 MG/DL (ref 0.66–1.25)
CREAT/UREA NIT SERPL: 16 (ref 12–20)
D DIMER PPP IA.FEU-MCNC: 1.62 MG/L FEU (ref 0.19–0.5)
EOSINOPHIL # BLD AUTO: 0.12 X10(3)/MCL (ref 0.04–0.54)
EOSINOPHIL NFR BLD AUTO: 1.4 % (ref 0.7–7)
ERYTHROCYTE [DISTWIDTH] IN BLOOD BY AUTOMATED COUNT: 14.7 %
GFR SERPLBLD CREATININE-BSD FMLA CKD-EPI: 73 ML/MIN/1.73/M2
GLOBULIN SER-MCNC: 2.8 GM/DL (ref 2–3.9)
GLUCOSE SERPL-MCNC: 53 MG/DL (ref 70–115)
HCT VFR BLD AUTO: 40.7 % (ref 36–52)
HGB BLD-MCNC: 13.7 G/DL (ref 13–18)
IMM GRANULOCYTES # BLD AUTO: 0.03 X10(3)/MCL (ref 0–0.03)
IMM GRANULOCYTES NFR BLD AUTO: 0.3 % (ref 0–0.5)
LIPASE SERPL-CCNC: 61 U/L (ref 23–300)
LYMPHOCYTES # BLD AUTO: 1.99 X10(3)/MCL (ref 1.32–3.57)
LYMPHOCYTES NFR BLD AUTO: 22.5 % (ref 20–55)
MAGNESIUM SERPL-MCNC: 2 MG/DL (ref 1.8–2.4)
MCH RBC QN AUTO: 30.9 PG (ref 27–34)
MCHC RBC AUTO-ENTMCNC: 33.7 G/DL (ref 31–37)
MCV RBC AUTO: 91.9 FL (ref 79–99)
METHADONE UR QL SCN: NEGATIVE
MONOCYTES # BLD AUTO: 0.68 X10(3)/MCL (ref 0.3–0.82)
MONOCYTES NFR BLD AUTO: 7.7 % (ref 4.7–12.5)
NEUTROPHILS # BLD AUTO: 5.99 X10(3)/MCL (ref 1.78–5.38)
NEUTROPHILS NFR BLD AUTO: 67.9 % (ref 37–73)
NRBC BLD AUTO-RTO: 0 %
OPIATES UR QL SCN: NEGATIVE
PCP UR QL: NEGATIVE
PH UR: 6.5 [PH] (ref 5–8)
PLATELET # BLD AUTO: 302 X10(3)/MCL (ref 140–371)
PMV BLD AUTO: 9.5 FL (ref 9.4–12.4)
POCT GLUCOSE: 55 MG/DL (ref 70–110)
POCT GLUCOSE: 64 MG/DL (ref 70–110)
POCT GLUCOSE: 82 MG/DL (ref 70–110)
POTASSIUM SERPL-SCNC: 4 MMOL/L (ref 3.5–5.1)
PROT SERPL-MCNC: 6.1 GM/DL (ref 6.3–8.2)
RBC # BLD AUTO: 4.43 X10(6)/MCL (ref 4–6)
SODIUM SERPL-SCNC: 134 MMOL/L (ref 136–145)
TROPONIN I SERPL-MCNC: <0.012 NG/ML (ref 0–0.03)
TROPONIN I SERPL-MCNC: <0.012 NG/ML (ref 0–0.03)
WBC # BLD AUTO: 8.83 X10(3)/MCL (ref 4–11.5)

## 2025-02-26 PROCEDURE — 25000003 PHARM REV CODE 250: Performed by: NURSE PRACTITIONER

## 2025-02-26 PROCEDURE — G0378 HOSPITAL OBSERVATION PER HR: HCPCS

## 2025-02-26 PROCEDURE — 93005 ELECTROCARDIOGRAM TRACING: CPT

## 2025-02-26 PROCEDURE — 85379 FIBRIN DEGRADATION QUANT: CPT | Performed by: FAMILY MEDICINE

## 2025-02-26 PROCEDURE — 63600175 PHARM REV CODE 636 W HCPCS: Performed by: NURSE PRACTITIONER

## 2025-02-26 PROCEDURE — 83880 ASSAY OF NATRIURETIC PEPTIDE: CPT | Performed by: FAMILY MEDICINE

## 2025-02-26 PROCEDURE — 63600175 PHARM REV CODE 636 W HCPCS: Performed by: FAMILY MEDICINE

## 2025-02-26 PROCEDURE — 93010 ELECTROCARDIOGRAM REPORT: CPT | Mod: ,,, | Performed by: INTERNAL MEDICINE

## 2025-02-26 PROCEDURE — 25000003 PHARM REV CODE 250: Performed by: STUDENT IN AN ORGANIZED HEALTH CARE EDUCATION/TRAINING PROGRAM

## 2025-02-26 PROCEDURE — 85025 COMPLETE CBC W/AUTO DIFF WBC: CPT | Performed by: FAMILY MEDICINE

## 2025-02-26 PROCEDURE — 25500020 PHARM REV CODE 255: Performed by: FAMILY MEDICINE

## 2025-02-26 PROCEDURE — 25000003 PHARM REV CODE 250: Performed by: INTERNAL MEDICINE

## 2025-02-26 PROCEDURE — 96374 THER/PROPH/DIAG INJ IV PUSH: CPT

## 2025-02-26 PROCEDURE — 80307 DRUG TEST PRSMV CHEM ANLYZR: CPT | Performed by: FAMILY MEDICINE

## 2025-02-26 PROCEDURE — 96372 THER/PROPH/DIAG INJ SC/IM: CPT | Performed by: NURSE PRACTITIONER

## 2025-02-26 PROCEDURE — 84484 ASSAY OF TROPONIN QUANT: CPT | Mod: 91 | Performed by: NURSE PRACTITIONER

## 2025-02-26 PROCEDURE — 80053 COMPREHEN METABOLIC PANEL: CPT | Performed by: FAMILY MEDICINE

## 2025-02-26 PROCEDURE — 99285 EMERGENCY DEPT VISIT HI MDM: CPT | Mod: 25

## 2025-02-26 PROCEDURE — 82962 GLUCOSE BLOOD TEST: CPT

## 2025-02-26 PROCEDURE — 83690 ASSAY OF LIPASE: CPT | Performed by: FAMILY MEDICINE

## 2025-02-26 PROCEDURE — 84484 ASSAY OF TROPONIN QUANT: CPT | Performed by: FAMILY MEDICINE

## 2025-02-26 PROCEDURE — 36415 COLL VENOUS BLD VENIPUNCTURE: CPT | Performed by: INTERNAL MEDICINE

## 2025-02-26 PROCEDURE — 83735 ASSAY OF MAGNESIUM: CPT | Performed by: FAMILY MEDICINE

## 2025-02-26 RX ORDER — ENOXAPARIN SODIUM 100 MG/ML
1 INJECTION SUBCUTANEOUS
Status: DISCONTINUED | OUTPATIENT
Start: 2025-02-26 | End: 2025-02-27

## 2025-02-26 RX ORDER — ESCITALOPRAM OXALATE 10 MG/1
10 TABLET ORAL DAILY
Status: DISCONTINUED | OUTPATIENT
Start: 2025-02-27 | End: 2025-02-28 | Stop reason: HOSPADM

## 2025-02-26 RX ORDER — TALC
6 POWDER (GRAM) TOPICAL NIGHTLY PRN
Status: DISCONTINUED | OUTPATIENT
Start: 2025-02-26 | End: 2025-02-28 | Stop reason: HOSPADM

## 2025-02-26 RX ORDER — MELOXICAM 7.5 MG/1
7.5 TABLET ORAL DAILY
Status: DISCONTINUED | OUTPATIENT
Start: 2025-02-27 | End: 2025-02-28 | Stop reason: HOSPADM

## 2025-02-26 RX ORDER — ACETAMINOPHEN 325 MG/1
650 TABLET ORAL EVERY 6 HOURS PRN
Status: DISCONTINUED | OUTPATIENT
Start: 2025-02-26 | End: 2025-02-28 | Stop reason: HOSPADM

## 2025-02-26 RX ORDER — FUROSEMIDE 10 MG/ML
60 INJECTION INTRAMUSCULAR; INTRAVENOUS
Status: DISPENSED | OUTPATIENT
Start: 2025-02-27 | End: 2025-02-28

## 2025-02-26 RX ORDER — SODIUM CHLORIDE 0.9 % (FLUSH) 0.9 %
10 SYRINGE (ML) INJECTION
Status: DISCONTINUED | OUTPATIENT
Start: 2025-02-26 | End: 2025-02-28 | Stop reason: HOSPADM

## 2025-02-26 RX ORDER — METOPROLOL SUCCINATE 25 MG/1
25 TABLET, EXTENDED RELEASE ORAL DAILY
Status: DISCONTINUED | OUTPATIENT
Start: 2025-02-27 | End: 2025-02-27

## 2025-02-26 RX ORDER — ASPIRIN 81 MG/1
81 TABLET ORAL DAILY
Status: DISCONTINUED | OUTPATIENT
Start: 2025-02-26 | End: 2025-02-28 | Stop reason: HOSPADM

## 2025-02-26 RX ORDER — ONDANSETRON HYDROCHLORIDE 2 MG/ML
4 INJECTION, SOLUTION INTRAVENOUS EVERY 8 HOURS PRN
Status: DISCONTINUED | OUTPATIENT
Start: 2025-02-26 | End: 2025-02-28 | Stop reason: HOSPADM

## 2025-02-26 RX ORDER — BUPROPION HYDROCHLORIDE 150 MG/1
150 TABLET ORAL DAILY
Status: DISCONTINUED | OUTPATIENT
Start: 2025-02-27 | End: 2025-02-28 | Stop reason: HOSPADM

## 2025-02-26 RX ORDER — MELOXICAM 7.5 MG/1
7.5 TABLET ORAL DAILY
COMMUNITY

## 2025-02-26 RX ORDER — FUROSEMIDE 10 MG/ML
40 INJECTION INTRAMUSCULAR; INTRAVENOUS EVERY 12 HOURS
Status: DISCONTINUED | OUTPATIENT
Start: 2025-02-27 | End: 2025-02-28 | Stop reason: HOSPADM

## 2025-02-26 RX ORDER — ATORVASTATIN CALCIUM 40 MG/1
40 TABLET, FILM COATED ORAL NIGHTLY
Status: DISCONTINUED | OUTPATIENT
Start: 2025-02-26 | End: 2025-02-28 | Stop reason: HOSPADM

## 2025-02-26 RX ORDER — HYDRALAZINE HYDROCHLORIDE 20 MG/ML
20 INJECTION INTRAMUSCULAR; INTRAVENOUS EVERY 4 HOURS PRN
Status: DISCONTINUED | OUTPATIENT
Start: 2025-02-26 | End: 2025-02-28 | Stop reason: HOSPADM

## 2025-02-26 RX ORDER — PANTOPRAZOLE SODIUM 40 MG/1
40 TABLET, DELAYED RELEASE ORAL DAILY
Status: DISCONTINUED | OUTPATIENT
Start: 2025-02-27 | End: 2025-02-28 | Stop reason: HOSPADM

## 2025-02-26 RX ORDER — FUROSEMIDE 10 MG/ML
60 INJECTION INTRAMUSCULAR; INTRAVENOUS
Status: COMPLETED | OUTPATIENT
Start: 2025-02-26 | End: 2025-02-26

## 2025-02-26 RX ORDER — LISINOPRIL 10 MG/1
10 TABLET ORAL DAILY
Status: DISCONTINUED | OUTPATIENT
Start: 2025-02-27 | End: 2025-02-26

## 2025-02-26 RX ADMIN — ENOXAPARIN SODIUM 70 MG: 100 INJECTION SUBCUTANEOUS at 07:02

## 2025-02-26 RX ADMIN — ASPIRIN 81 MG: 81 TABLET, COATED ORAL at 07:02

## 2025-02-26 RX ADMIN — ATORVASTATIN CALCIUM 40 MG: 40 TABLET, FILM COATED ORAL at 09:02

## 2025-02-26 RX ADMIN — NITROGLYCERIN 1 INCH: 20 OINTMENT TOPICAL at 07:02

## 2025-02-26 RX ADMIN — FUROSEMIDE 60 MG: 10 INJECTION, SOLUTION INTRAMUSCULAR; INTRAVENOUS at 04:02

## 2025-02-26 RX ADMIN — IOHEXOL 100 ML: 350 INJECTION, SOLUTION INTRAVENOUS at 05:02

## 2025-02-26 RX ADMIN — SACUBITRIL AND VALSARTAN 1 TABLET: 24; 26 TABLET, FILM COATED ORAL at 09:02

## 2025-02-26 RX ADMIN — Medication 6 MG: at 09:02
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
## 2025-02-27 PROBLEM — J96.01 ACUTE HYPOXIC RESPIRATORY FAILURE: Status: ACTIVE | Noted: 2025-02-27

## 2025-02-27 PROBLEM — I26.94 MULTIPLE SUBSEGMENTAL PULMONARY EMBOLI WITHOUT ACUTE COR PULMONALE: Status: ACTIVE | Noted: 2025-02-27

## 2025-02-27 PROBLEM — I34.0 MITRAL REGURGITATION: Status: ACTIVE | Noted: 2025-02-27

## 2025-02-27 PROBLEM — I50.20 SEVERE SYSTOLIC CONGESTIVE HEART FAILURE: Status: ACTIVE | Noted: 2024-12-25

## 2025-02-27 PROBLEM — I27.20 PULMONARY HYPERTENSION: Status: ACTIVE | Noted: 2025-02-27

## 2025-02-27 PROBLEM — D64.9 ANEMIA: Status: ACTIVE | Noted: 2025-02-27

## 2025-02-27 LAB
ALBUMIN SERPL-MCNC: 2.9 G/DL (ref 3.4–5)
ALBUMIN/GLOB SERPL: 1.2 RATIO
ALP SERPL-CCNC: 92 UNIT/L (ref 50–144)
ALT SERPL-CCNC: 64 UNIT/L (ref 1–45)
ANION GAP SERPL CALC-SCNC: 4 MEQ/L (ref 2–13)
AORTIC VALVE CUSP SEPERATION: 1.45 CM
ASCENDING AORTA: 3.59 CM
AST SERPL-CCNC: 37 UNIT/L (ref 17–59)
AV INDEX (PROSTH): 0.57
AV MEAN GRADIENT: 2 MMHG
AV PEAK GRADIENT: 3 MMHG
AV VALVE AREA BY VELOCITY RATIO: 2.1 CM²
AV VALVE AREA: 1.8 CM²
AV VELOCITY RATIO: 0.67
BASOPHILS # BLD AUTO: 0.04 X10(3)/MCL (ref 0.01–0.08)
BASOPHILS NFR BLD AUTO: 0.6 % (ref 0.1–1.2)
BILIRUB SERPL-MCNC: 1 MG/DL (ref 0–1)
BSA FOR ECHO PROCEDURE: 1.82 M2
BUN SERPL-MCNC: 21 MG/DL (ref 7–20)
CALCIUM SERPL-MCNC: 8.5 MG/DL (ref 8.4–10.2)
CHLORIDE SERPL-SCNC: 99 MMOL/L (ref 98–110)
CO2 SERPL-SCNC: 31 MMOL/L (ref 21–32)
COLOR STL: NORMAL
CONSISTENCY STL: NORMAL
CREAT SERPL-MCNC: 1.17 MG/DL (ref 0.66–1.25)
CREAT/UREA NIT SERPL: 18 (ref 12–20)
CV ECHO LV RWT: 0.57 CM
DOP CALC AO PEAK VEL: 0.9 M/S
DOP CALC AO VTI: 13.8 CM
DOP CALC LVOT AREA: 3.1 CM2
DOP CALC LVOT DIAMETER: 2 CM
DOP CALC LVOT PEAK VEL: 0.6 M/S
DOP CALC LVOT STROKE VOLUME: 24.5 CM3
DOP CALCLVOT PEAK VEL VTI: 7.8 CM
E WAVE DECELERATION TIME: 80 MSEC
E/A RATIO: 2.89
E/E' RATIO: 19 M/S
ECHO LV POSTERIOR WALL: 1.6 CM (ref 0.6–1.1)
EOSINOPHIL # BLD AUTO: 0.16 X10(3)/MCL (ref 0.04–0.54)
EOSINOPHIL NFR BLD AUTO: 2.2 % (ref 0.7–7)
ERYTHROCYTE [DISTWIDTH] IN BLOOD BY AUTOMATED COUNT: 15.1 %
FRACTIONAL SHORTENING: 5.4 % (ref 28–44)
GFR SERPLBLD CREATININE-BSD FMLA CKD-EPI: 67 ML/MIN/1.73/M2
GLOBULIN SER-MCNC: 2.5 GM/DL (ref 2–3.9)
GLUCOSE SERPL-MCNC: 73 MG/DL (ref 70–115)
HCT VFR BLD AUTO: 41.8 % (ref 36–52)
HEMOCCULT SP1 STL QL: NEGATIVE
HGB BLD-MCNC: 13.9 G/DL (ref 13–18)
IMM GRANULOCYTES # BLD AUTO: 0.02 X10(3)/MCL (ref 0–0.03)
IMM GRANULOCYTES NFR BLD AUTO: 0.3 % (ref 0–0.5)
INFERIOR VENA CAVA SIZE SNIFF: 1.6 CM
INTERVENTRICULAR SEPTUM: 1.6 CM (ref 0.6–1.1)
IVC DIAMETER: 2.8 CM
LEFT ATRIUM SIZE: 4.7 CM
LEFT ATRIUM VOLUME INDEX MOD: 50 ML/M2
LEFT ATRIUM VOLUME MOD: 90 ML
LEFT INTERNAL DIMENSION IN SYSTOLE: 5.3 CM (ref 2.1–4)
LEFT VENTRICLE DIASTOLIC VOLUME INDEX: 85.08 ML/M2
LEFT VENTRICLE DIASTOLIC VOLUME: 154 ML
LEFT VENTRICLE END SYSTOLIC VOLUME APICAL 2 CHAMBER: 110.2 ML
LEFT VENTRICLE END SYSTOLIC VOLUME APICAL 4 CHAMBER: 73.9 ML
LEFT VENTRICLE MASS INDEX: 232.9 G/M2
LEFT VENTRICLE SYSTOLIC VOLUME INDEX: 76.2 ML/M2
LEFT VENTRICLE SYSTOLIC VOLUME: 138 ML
LEFT VENTRICULAR INTERNAL DIMENSION IN DIASTOLE: 5.6 CM (ref 3.5–6)
LEFT VENTRICULAR MASS: 421.5 G
LV LATERAL E/E' RATIO: 17.3 M/S
LV SEPTAL E/E' RATIO: 20.8 M/S
LVED V (TEICH): 154.3 ML
LVES V (TEICH): 137.7 ML
LVOT MG: 0.8 MMHG
LVOT MV: 0.39 CM/S
LYMPHOCYTES # BLD AUTO: 2.16 X10(3)/MCL (ref 1.32–3.57)
LYMPHOCYTES NFR BLD AUTO: 29.7 % (ref 20–55)
MAGNESIUM SERPL-MCNC: 1.9 MG/DL (ref 1.8–2.4)
MCH RBC QN AUTO: 30.9 PG (ref 27–34)
MCHC RBC AUTO-ENTMCNC: 33.3 G/DL (ref 31–37)
MCV RBC AUTO: 92.9 FL (ref 79–99)
MONOCYTES # BLD AUTO: 0.53 X10(3)/MCL (ref 0.3–0.82)
MONOCYTES NFR BLD AUTO: 7.3 % (ref 4.7–12.5)
MV PEAK A VEL: 0.36 M/S
MV PEAK E VEL: 1.04 M/S
NEUTROPHILS # BLD AUTO: 4.36 X10(3)/MCL (ref 1.78–5.38)
NEUTROPHILS NFR BLD AUTO: 59.9 % (ref 37–73)
NRBC BLD AUTO-RTO: 0 %
OHS CV RV/LV RATIO: 0.77 CM
OHS QRS DURATION: 132 MS
OHS QTC CALCULATION: 500 MS
PERICARDIUM POSTERIOR DIMENSION: 1.1 CM
PISA TR MAX VEL: 6 M/S
PLATELET # BLD AUTO: 257 X10(3)/MCL (ref 140–371)
PMV BLD AUTO: 9.1 FL (ref 9.4–12.4)
POTASSIUM SERPL-SCNC: 4.1 MMOL/L (ref 3.5–5.1)
PROT SERPL-MCNC: 5.4 GM/DL (ref 6.3–8.2)
RA MAJOR: 6 CM
RA PRESSURE ESTIMATED: 3 MMHG
RBC # BLD AUTO: 4.5 X10(6)/MCL (ref 4–6)
RIGHT ATRIAL AREA: 27.8 CM2
RIGHT ATRIUM VOLUME AREA LENGTH APICAL 4 CHAMBER: 103.7 ML
RIGHT VENTRICLE DIASTOLIC BASEL DIMENSION: 4.3 CM
RV TB RVSP: 9 MMHG
SODIUM SERPL-SCNC: 134 MMOL/L (ref 136–145)
TDI LATERAL: 0.06 M/S
TDI SEPTAL: 0.05 M/S
TDI: 0.06 M/S
TRICUSPID ANNULAR PLANE SYSTOLIC EXCURSION: 1.39 CM
TV REST PULMONARY ARTERY PRESSURE: 147 MMHG
WBC # BLD AUTO: 7.27 X10(3)/MCL (ref 4–11.5)
Z-SCORE OF LEFT VENTRICULAR DIMENSION IN END DIASTOLE: 1.12
Z-SCORE OF LEFT VENTRICULAR DIMENSION IN END SYSTOLE: 4.26

## 2025-02-27 PROCEDURE — 25000003 PHARM REV CODE 250: Performed by: NURSE PRACTITIONER

## 2025-02-27 PROCEDURE — 80053 COMPREHEN METABOLIC PANEL: CPT | Performed by: NURSE PRACTITIONER

## 2025-02-27 PROCEDURE — 96376 TX/PRO/DX INJ SAME DRUG ADON: CPT

## 2025-02-27 PROCEDURE — 85025 COMPLETE CBC W/AUTO DIFF WBC: CPT | Performed by: INTERNAL MEDICINE

## 2025-02-27 PROCEDURE — 25000003 PHARM REV CODE 250: Performed by: INTERNAL MEDICINE

## 2025-02-27 PROCEDURE — 11000001 HC ACUTE MED/SURG PRIVATE ROOM

## 2025-02-27 PROCEDURE — 36415 COLL VENOUS BLD VENIPUNCTURE: CPT | Performed by: NURSE PRACTITIONER

## 2025-02-27 PROCEDURE — 82272 OCCULT BLD FECES 1-3 TESTS: CPT | Performed by: FAMILY MEDICINE

## 2025-02-27 PROCEDURE — 63600175 PHARM REV CODE 636 W HCPCS: Performed by: NURSE PRACTITIONER

## 2025-02-27 PROCEDURE — S4991 NICOTINE PATCH NONLEGEND: HCPCS | Performed by: FAMILY MEDICINE

## 2025-02-27 PROCEDURE — 84484 ASSAY OF TROPONIN QUANT: CPT | Performed by: FAMILY MEDICINE

## 2025-02-27 PROCEDURE — 83735 ASSAY OF MAGNESIUM: CPT | Performed by: NURSE PRACTITIONER

## 2025-02-27 PROCEDURE — 21400001 HC TELEMETRY ROOM

## 2025-02-27 PROCEDURE — 94761 N-INVAS EAR/PLS OXIMETRY MLT: CPT

## 2025-02-27 PROCEDURE — 25000003 PHARM REV CODE 250: Performed by: FAMILY MEDICINE

## 2025-02-27 RX ORDER — CARVEDILOL 3.12 MG/1
3.12 TABLET ORAL 2 TIMES DAILY
Status: DISCONTINUED | OUTPATIENT
Start: 2025-02-28 | End: 2025-02-28 | Stop reason: HOSPADM

## 2025-02-27 RX ORDER — IBUPROFEN 200 MG
1 TABLET ORAL DAILY
Status: DISCONTINUED | OUTPATIENT
Start: 2025-02-27 | End: 2025-02-28 | Stop reason: HOSPADM

## 2025-02-27 RX ORDER — CALCIUM CARBONATE 200(500)MG
500 TABLET,CHEWABLE ORAL 2 TIMES DAILY PRN
Status: DISCONTINUED | OUTPATIENT
Start: 2025-02-27 | End: 2025-02-27

## 2025-02-27 RX ADMIN — NITROGLYCERIN 1 INCH: 20 OINTMENT TOPICAL at 05:02

## 2025-02-27 RX ADMIN — PANTOPRAZOLE SODIUM 40 MG: 40 TABLET, DELAYED RELEASE ORAL at 09:02

## 2025-02-27 RX ADMIN — FUROSEMIDE 40 MG: 10 INJECTION, SOLUTION INTRAMUSCULAR; INTRAVENOUS at 08:02

## 2025-02-27 RX ADMIN — ASPIRIN 81 MG: 81 TABLET, COATED ORAL at 09:02

## 2025-02-27 RX ADMIN — BUPROPION HYDROCHLORIDE 150 MG: 150 TABLET, EXTENDED RELEASE ORAL at 09:02

## 2025-02-27 RX ADMIN — ESCITALOPRAM OXALATE 10 MG: 10 TABLET ORAL at 09:02

## 2025-02-27 RX ADMIN — MELOXICAM 7.5 MG: 7.5 TABLET ORAL at 09:02

## 2025-02-27 RX ADMIN — SACUBITRIL AND VALSARTAN 1 TABLET: 24; 26 TABLET, FILM COATED ORAL at 08:02

## 2025-02-27 RX ADMIN — NICOTINE 1 PATCH: 21 PATCH, EXTENDED RELEASE TRANSDERMAL at 10:02

## 2025-02-27 RX ADMIN — RIVAROXABAN 15 MG: 15 TABLET, FILM COATED ORAL at 05:02

## 2025-02-27 RX ADMIN — RIVAROXABAN 15 MG: 15 TABLET, FILM COATED ORAL at 10:02

## 2025-02-27 RX ADMIN — NITROGLYCERIN 1 INCH: 20 OINTMENT TOPICAL at 12:02

## 2025-02-27 RX ADMIN — METOPROLOL SUCCINATE 25 MG: 25 TABLET, EXTENDED RELEASE ORAL at 09:02

## 2025-02-27 RX ADMIN — ATORVASTATIN CALCIUM 40 MG: 40 TABLET, FILM COATED ORAL at 08:02

## 2025-02-27 RX ADMIN — FUROSEMIDE 40 MG: 10 INJECTION, SOLUTION INTRAMUSCULAR; INTRAVENOUS at 09:02

## 2025-02-27 RX ADMIN — SACUBITRIL AND VALSARTAN 1 TABLET: 24; 26 TABLET, FILM COATED ORAL at 09:02

## 2025-02-27 NOTE — HPI
Chief Complaint: leg swelling  HPI:   Patient is a 68yo male with pmhx htn who presents with a dyspnea on exertion x2 days.  He also relates lower extremity swelling.  States he has had this before.  He states I get fluid buildup in my lungs.  No coronary interventions that he relates.  The patient is a an occasional smoker and a very poor historian.  Knows he sees a primary care physician but can not recall her name.  He denies chest pain nausea or vomiting.  He also relates complaining of constipation and mild vague abdominal discomfort no nausea or vomiting. He is admitted for chf exacerbation and PE. Per cardiology seen on consult November 5, 2024 for hypertension and symptoms of congestive heart failure. He had an echocardiogram and a PET scan. His PET scan was consistent with ischemia showing a reversible defect in the apical apical inferior and apical lateral segments. Echocardiogram showed an EF of 25% with 3+ MR 1 to 2+ TR. PA pressures are markedly elevated at 60. He never returned for a follow up visit he was also recently in the hospital for congestive heart failure and cocaine abuse was diuresed and discharged. Admission was a signout from the day hospitalist.

## 2025-02-27 NOTE — ASSESSMENT & PLAN NOTE
Echocardiogram with evidence of mitral regurgitation that is severe . The patient's most recent echocardiogram result is listed below. We will manage the valvular abnormality by  [facility] f/u    No echocardiogram results found for the past 12 months

## 2025-02-27 NOTE — HOSPITAL COURSE
02/27/2025 pt presented with increased LE swelling, pt found to have large right main and RLL segmental PE x 2.  He is a poor historian, seems to be taking meds since last d/c, f/u with PCP, but did not see cardiology yet.  Known h/o % and Severe MR 3+ on echo, has not f/u with [facility] since all these tests were done, PA pressures elevated 60.    Pt c/o some sob and his home health nurse told him to come to ER yesterday.    02/28/2025 DISCHARGE SUMMARY: pt admitted with LE edema found to have DVT< h/o CHF last EF was 25% back in Nov, repeat now 5-10% on repeat echo aslo has multiple bilateral PE, starte don lovenox and transitioned to Xarelto.  He has a f/u appointmetn with [facility] on 03/03/2025 he missed his appointment on 02/17/2025 after the last hospital stay.  He will also f/u with PCP.  New meds are discussed with pt changed to entresto and coreg and added xarelto, will resume home health to help manage his new medications.

## 2025-02-27 NOTE — ASSESSMENT & PLAN NOTE
Echo and LE  this am  Xarelto  D/c lovenox  Discussed with pt importance of med compliance and f/u

## 2025-02-27 NOTE — PROGRESS NOTES
[facility]/Surg  [facility] Medicine  Progress Note    Patient Name: William South  MRN: [id number]  Patient Class: OP- Observation   Admission Date: 2/26/2025  Length of Stay: 0 days  Attending Physician: Isael Marte MD  Primary Care Provider: Bette Soliz NP        Subjective     Principal Problem:Multiple subsegmental pulmonary emboli without acute cor pulmonale        HPI:    Chief Complaint: leg swelling  HPI:   Patient is a 70yo male with pmhx htn who presents with a dyspnea on exertion x2 days.  He also relates lower extremity swelling.  States he has had this before.  He states I get fluid buildup in my lungs.  No coronary interventions that he relates.  The patient is a an occasional smoker and a very poor historian.  Knows he sees a primary care physician but can not recall her name.  He denies chest pain nausea or vomiting.  He also relates complaining of constipation and mild vague abdominal discomfort no nausea or vomiting. He is admitted for chf exacerbation and PE. Per cardiology seen on consult November 5, 2024 for hypertension and symptoms of congestive heart failure. He had an echocardiogram and a PET scan. His PET scan was consistent with ischemia showing a reversible defect in the apical apical inferior and apical lateral segments. Echocardiogram showed an EF of 25% with 3+ MR 1 to 2+ TR. PA pressures are markedly elevated at 60. He never returned for a follow up visit he was also recently in the hospital for congestive heart failure and cocaine abuse was diuresed and discharged. Admission was a signout from the day hospitalist.       Overview/Hospital Course:  02/27/2025 pt presented with increased LE swelling, pt found to have large right main and RLL segmental PE x 2.  He is a poor historian, seems to be taking meds since last d/c, f/u with PCP, but did not see cardiology yet.  Known h/o % and Severe MR 3+ on echo, has not f/u with [facility] since all these tests were  done, PA pressures elevated 60.    Pt c/o some sob and his home health nurse told him to come to ER yesterday.      Interval History:      Review of Systems   Constitutional:  Negative for appetite change, fatigue and fever.   Respiratory:  Negative for cough, shortness of breath and wheezing.    Cardiovascular:  Negative for chest pain and leg swelling.   Gastrointestinal:  Negative for abdominal distention, abdominal pain, constipation, diarrhea, nausea and vomiting.   Skin:  Negative for color change, pallor, rash and wound.   Neurological:  Negative for tremors, syncope and headaches.   Psychiatric/Behavioral:  Negative for agitation and behavioral problems.      Objective:     Vital Signs (Most Recent):  Temp: 98.1 °F (36.7 °C) (02/27/25 0743)  Pulse: 98 (02/27/25 0940)  Resp: 18 (02/27/25 0743)  BP: 117/83 (02/27/25 0941)  SpO2: 97 % (02/27/25 0743) Vital Signs (24h Range):  Temp:  [97.5 °F (36.4 °C)-98.4 °F (36.9 °C)] 98.1 °F (36.7 °C)  Pulse:  [] 98  Resp:  [11-22] 18  SpO2:  [79 %-100 %] 97 %  BP: (107-140)/() 117/83     Weight: 70 kg (154 lb 6.4 oz)  Body mass index is 24.18 kg/m².    Intake/Output Summary (Last 24 hours) at 2/27/2025 0936  Last data filed at 2/27/2025 0743  Gross per 24 hour   Intake 1200 ml   Output 2650 ml   Net -1450 ml         Physical Exam  Vitals and nursing note reviewed. Exam conducted with a chaperone present.   Constitutional:       General: He is not in acute distress.     Appearance: Normal appearance. He is normal weight. He is not ill-appearing.   HENT:      Head: Normocephalic and atraumatic.      Nose: Nose normal.   Eyes:      General: No scleral icterus.     Conjunctiva/sclera: Conjunctivae normal.   Cardiovascular:      Rate and Rhythm: Normal rate and regular rhythm.      Pulses: Normal pulses.      Heart sounds: Normal heart sounds.   Pulmonary:      Effort: Pulmonary effort is normal.      Breath sounds: Normal breath sounds.   Abdominal:      General:  Abdomen is flat. Bowel sounds are normal.      Palpations: Abdomen is soft.   Musculoskeletal:      Right lower leg: Edema present.      Left lower leg: Edema present.   Skin:     General: Skin is warm and dry.      Findings: No erythema or rash.   Neurological:      General: No focal deficit present.      Mental Status: He is alert and oriented to person, place, and time.   Psychiatric:         Mood and Affect: Mood normal.         Behavior: Behavior normal.         Thought Content: Thought content normal.             Significant Labs: All pertinent labs within the past 24 hours have been reviewed.  CBC:   Recent Labs   Lab 02/26/25  1539 02/27/25  0515   WBC 8.83 7.27   HGB 13.7 13.9   HCT 40.7 41.8    257     CMP:   Recent Labs   Lab 02/26/25  1536 02/27/25  0515   * 134*   K 4.0 4.1   CL 96* 99   CO2 31 31   BUN 18 21*   CREATININE 1.10 1.17   CALCIUM 8.3* 8.5   ALBUMIN 3.3* 2.9*   BILITOT 1.0 1.0   ALKPHOS 95 92   AST 81* 37   ALT 78* 64*       Significant Imaging: I have reviewed all pertinent imaging results/findings within the past 24 hours.    Assessment and Plan     * Multiple subsegmental pulmonary emboli without acute cor pulmonale  Echo and LE us this am  Xarelto  D/c lovenox  Discussed with pt importance of med compliance and f/u      Acute hypoxic respiratory failure  Patient with Hypoxic Respiratory failure which is Acute.  he is not on home oxygen. Supplemental oxygen was provided and noted-      .   Signs/symptoms of respiratory failure include- tachypnea and increased work of breathing. Contributing diagnoses includes - CHF, COPD, and Pulmonary Embolus Labs and images were reviewed. Patient Has not had a recent ABG. Will treat underlying causes and adjust management of respiratory failure as follows- continue oxygen  Get echo  Xarelto for PE    Severe systolic congestive heart failure  Patient has Systolic (HFrEF) heart failure that is Acute on chronic. On presentation their CHF was  "decompensated. Evidence of decompensated CHF on presentation includes: edema, dyspnea on exertion (NAVA), and shortness of breath. The etiology of their decompensation is likely dietary indiscretion, increased fluid intake, and medication non-compliance. Most recent BNP and echo results are listed below.  No results for input(s): "BNP" in the last 72 hours.  Latest ECHO  No results found for this or any previous visit.    Current Heart Failure Medications  furosemide injection 60 mg, ED 1 Time, Intravenous  hydrALAZINE injection 20 mg, Every 4 hours PRN, Intravenous  furosemide injection 40 mg, Every 12 hours, Intravenous  sacubitriL-valsartan 24-26 mg per tablet 1 tablet, 2 times daily, Oral  metoprolol succinate (TOPROL-XL) 24 hr tablet 25 mg, Daily, Oral    Plan  - Monitor strict I&Os and daily weights.    - Place on telemetry  - Low sodium diet  - Place on fluid restriction of 1.5 L.   - Cardiology has been consulted  - The patient's volume status is stable but not at their baseline as indicated by orthopnea, paroxysmal nocturnal dyspnea (PND), dyspnea on exertion (NAVA), and shortness of breath  -          Mitral regurgitation  Echocardiogram with evidence of mitral regurgitation that is severe . The patient's most recent echocardiogram result is listed below. We will manage the valvular abnormality by  [facility] f/u    No echocardiogram results found for the past 12 months     Primary hypertension  Patient's blood pressure range in the last 24 hours was: BP  Min: 107/83  Max: 140/105.The patient's inpatient anti-hypertensive regimen is listed below:  Current Antihypertensives  furosemide injection 60 mg, ED 1 Time, Intravenous  nitroGLYCERIN 2% TD oint ointment 1 inch, Every 6 hours, Topical (Top)  hydrALAZINE injection 20 mg, Every 4 hours PRN, Intravenous  furosemide injection 40 mg, Every 12 hours, Intravenous  metoprolol succinate (TOPROL-XL) 24 hr tablet 25 mg, Daily, Oral    Plan  - BP is controlled, no changes " needed to their regimen  -      Pulmonary hypertension  Followed by [facility]      Anemia  Anemia is likely due to Iron deficiency. Most recent hemoglobin and hematocrit are listed below.  Recent Labs     02/26/25  1539 02/27/25  0515   HGB 13.7 13.9   HCT 40.7 41.8     Plan  - Monitor serial CBC: Daily  - Transfuse PRBC if patient becomes hemodynamically unstable, symptomatic or H/H drops below 7/21.  - Patient has not received any PRBC transfusions to date  - Patient's anemia is currently stable  - will need to monitor on xarelto    Substance abuse  Discussed with pt improtance to d/c cocaine use      Cocaine abuse  Discussed with pt regarding ongoing cardiac issues significantly worsened with cocaine use      Tobacco abuse disorder  nicoderm        VTE Risk Mitigation (From admission, onward)           Ordered     rivaroxaban tablet 15 mg  2 times daily with meals         02/27/25 0933     Reason for no Mechanical VTE Prophylaxis  Once        Comments: Lovenox already initiated   Question:  Reasons:  Answer:  Physician Provided (leave comment)    02/26/25 1843     IP VTE HIGH RISK PATIENT  Once         02/26/25 1843                    Discharge Planning   CHASIDY:      Code Status: Full Code   Medical Readiness for Discharge Date:   Discharge Plan A: Home Health                        Racquel Soares MD  Department of Hospital Medicine   [facility]
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
## 2025-02-27 NOTE — ASSESSMENT & PLAN NOTE
Anemia is likely due to Iron deficiency. Most recent hemoglobin and hematocrit are listed below.  Recent Labs     02/26/25  1539 02/27/25  0515   HGB 13.7 13.9   HCT 40.7 41.8     Plan  - Monitor serial CBC: Daily  - Transfuse PRBC if patient becomes hemodynamically unstable, symptomatic or H/H drops below 7/21.  - Patient has not received any PRBC transfusions to date  - Patient's anemia is currently stable  - will need to monitor on xarelto

## 2025-02-27 NOTE — PLAN OF CARE
Problem: Adult Inpatient Plan of Care  Goal: Plan of Care Review  Outcome: Progressing  Goal: Patient-Specific Goal (Individualized)  Outcome: Progressing  Goal: Absence of Hospital-Acquired Illness or Injury  Outcome: Progressing  Goal: Optimal Comfort and Wellbeing  Outcome: Progressing  Goal: Readiness for Transition of Care  Outcome: Progressing     Problem: VTE (Venous Thromboembolism)  Goal: Tissue Perfusion  Outcome: Progressing  Goal: Right Ventricular Function  Outcome: Progressing     Problem: Fall Injury Risk  Goal: Absence of Fall and Fall-Related Injury  Outcome: Progressing     Problem: Heart Failure  Goal: Optimal Coping  Outcome: Progressing  Goal: Optimal Cardiac Output  Outcome: Progressing  Goal: Stable Heart Rate and Rhythm  Outcome: Progressing  Goal: Optimal Functional Ability  Outcome: Progressing  Goal: Fluid and Electrolyte Balance  Outcome: Progressing  Goal: Improved Oral Intake  Outcome: Progressing  Goal: Effective Oxygenation and Ventilation  Outcome: Progressing  Goal: Effective Breathing Pattern During Sleep  Outcome: Progressing
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
## 2025-02-27 NOTE — SUBJECTIVE & OBJECTIVE
Interval History:      Review of Systems   Constitutional:  Negative for appetite change, fatigue and fever.   Respiratory:  Negative for cough, shortness of breath and wheezing.    Cardiovascular:  Negative for chest pain and leg swelling.   Gastrointestinal:  Negative for abdominal distention, abdominal pain, constipation, diarrhea, nausea and vomiting.   Skin:  Negative for color change, pallor, rash and wound.   Neurological:  Negative for tremors, syncope and headaches.   Psychiatric/Behavioral:  Negative for agitation and behavioral problems.      Objective:     Vital Signs (Most Recent):  Temp: 98.1 °F (36.7 °C) (02/27/25 0743)  Pulse: 98 (02/27/25 0940)  Resp: 18 (02/27/25 0743)  BP: 117/83 (02/27/25 0941)  SpO2: 97 % (02/27/25 0743) Vital Signs (24h Range):  Temp:  [97.5 °F (36.4 °C)-98.4 °F (36.9 °C)] 98.1 °F (36.7 °C)  Pulse:  [] 98  Resp:  [11-22] 18  SpO2:  [79 %-100 %] 97 %  BP: (107-140)/() 117/83     Weight: 70 kg (154 lb 6.4 oz)  Body mass index is 24.18 kg/m².    Intake/Output Summary (Last 24 hours) at 2/27/2025 0949  Last data filed at 2/27/2025 0743  Gross per 24 hour   Intake 1200 ml   Output 2650 ml   Net -1450 ml         Physical Exam  Vitals and nursing note reviewed. Exam conducted with a chaperone present.   Constitutional:       General: He is not in acute distress.     Appearance: Normal appearance. He is normal weight. He is not ill-appearing.   HENT:      Head: Normocephalic and atraumatic.      Nose: Nose normal.   Eyes:      General: No scleral icterus.     Conjunctiva/sclera: Conjunctivae normal.   Cardiovascular:      Rate and Rhythm: Normal rate and regular rhythm.      Pulses: Normal pulses.      Heart sounds: Normal heart sounds.   Pulmonary:      Effort: Pulmonary effort is normal.      Breath sounds: Normal breath sounds.   Abdominal:      General: Abdomen is flat. Bowel sounds are normal.      Palpations: Abdomen is soft.   Musculoskeletal:      Right lower leg:  Edema present.      Left lower leg: Edema present.   Skin:     General: Skin is warm and dry.      Findings: No erythema or rash.   Neurological:      General: No focal deficit present.      Mental Status: He is alert and oriented to person, place, and time.   Psychiatric:         Mood and Affect: Mood normal.         Behavior: Behavior normal.         Thought Content: Thought content normal.             Significant Labs: All pertinent labs within the past 24 hours have been reviewed.  CBC:   Recent Labs   Lab 02/26/25  1539 02/27/25  0515   WBC 8.83 7.27   HGB 13.7 13.9   HCT 40.7 41.8    257     CMP:   Recent Labs   Lab 02/26/25  1536 02/27/25  0515   * 134*   K 4.0 4.1   CL 96* 99   CO2 31 31   BUN 18 21*   CREATININE 1.10 1.17   CALCIUM 8.3* 8.5   ALBUMIN 3.3* 2.9*   BILITOT 1.0 1.0   ALKPHOS 95 92   AST 81* 37   ALT 78* 64*       Significant Imaging: I have reviewed all pertinent imaging results/findings within the past 24 hours.
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
## 2025-02-27 NOTE — PLAN OF CARE
02/27/25 0904   Readmission   Was this a planned readmission? No   Why were you hospitalized in the last 30 days? CHF   Why were you readmitted? Alarmed about signs/symptoms   When you left the hospital how did you feel? Way better but still kind of sluggish   When you left the hospital where did you go? Home with Home Health  ([facility])   Did patient/caregiver refused recommended DC plan? No   Tell me about what happened between when you left the hospital and the day you returned. over time just felt more short of breath and bloated   When did you start not feeling well? over the past couple of weeks   Did you try to manage your symptoms your self? No   Did you call anyone? No   Did you try to see or did see a doctor or nurse before you came? Yes  (Seen by Home Health nurse who recommended he come to ER for evaluation.)   Were you seen? Yes   Did you have  a follow-up appointment on discharge? Yes   Did you go? Yes

## 2025-02-27 NOTE — PLAN OF CARE
02/27/25 0834   Discharge Assessment   Assessment Type Discharge Planning Assessment   Confirmed/corrected address, phone number and insurance Yes   Confirmed Demographics Correct on Facesheet   Source of Information patient;health record   When was your last doctors appointment? 02/12/25   Communicated CHASIDY with patient/caregiver Yes   Reason For Admission CHF   People in Home significant other   Facility Arrived From: Home   Do you expect to return to your current living situation? Yes   Prior to hospitilization cognitive status: Alert/Oriented   Current cognitive status: Alert/Oriented   Walking or Climbing Stairs Difficulty no   Dressing/Bathing Difficulty no   Equipment Currently Used at Home cane, straight   Readmission within 30 days? Yes  ([facility] CHF 2/6/25-2/10/25)   Do you currently have service(s) that help you manage your care at home? Yes   Name and Contact number of agency [facility]   Is the pt/caregiver preference to resume services with current agency Yes   Do you take prescription medications? Yes   Do you have prescription coverage? Yes   Coverage [facility] Managed Medicare Dual Complete   Do you have any problems affording any of your prescribed medications? No   Is the patient taking medications as prescribed? yes   Who is going to help you get home at discharge? Sister   How do you get to doctors appointments? family or friend will provide   Are you on dialysis? No   Do you take coumadin? No   Discharge Plan [facility]   DME Needed Upon Discharge  none   Discharge Plan discussed with: Patient   Transition of Care Barriers Substance Abuse;Does not adhere to care plan   OTHER   Name(s) of People in Home SO

## 2025-02-27 NOTE — ASSESSMENT & PLAN NOTE
Patient with Hypoxic Respiratory failure which is Acute.  he is not on home oxygen. Supplemental oxygen was provided and noted-      .   Signs/symptoms of respiratory failure include- tachypnea and increased work of breathing. Contributing diagnoses includes - CHF, COPD, and Pulmonary Embolus Labs and images were reviewed. Patient Has not had a recent ABG. Will treat underlying causes and adjust management of respiratory failure as follows- continue oxygen  Get echo  Xarelto for PE
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
## 2025-02-27 NOTE — ASSESSMENT & PLAN NOTE
"Patient has Systolic (HFrEF) heart failure that is Acute on chronic. On presentation their CHF was decompensated. Evidence of decompensated CHF on presentation includes: edema, dyspnea on exertion (NAVA), and shortness of breath. The etiology of their decompensation is likely dietary indiscretion, increased fluid intake, and medication non-compliance. Most recent BNP and echo results are listed below.  No results for input(s): "BNP" in the last 72 hours.  Latest ECHO  No results found for this or any previous visit.    Current Heart Failure Medications  furosemide injection 60 mg, ED 1 Time, Intravenous  hydrALAZINE injection 20 mg, Every 4 hours PRN, Intravenous  furosemide injection 40 mg, Every 12 hours, Intravenous  sacubitriL-valsartan 24-26 mg per tablet 1 tablet, 2 times daily, Oral  metoprolol succinate (TOPROL-XL) 24 hr tablet 25 mg, Daily, Oral    Plan  - Monitor strict I&Os and daily weights.    - Place on telemetry  - Low sodium diet  - Place on fluid restriction of 1.5 L.   - Cardiology has been consulted  - The patient's volume status is stable but not at their baseline as indicated by orthopnea, paroxysmal nocturnal dyspnea (PND), dyspnea on exertion (NAVA), and shortness of breath  -        "

## 2025-02-27 NOTE — ASSESSMENT & PLAN NOTE
Patient's blood pressure range in the last 24 hours was: BP  Min: 107/83  Max: 140/105.The patient's inpatient anti-hypertensive regimen is listed below:  Current Antihypertensives  furosemide injection 60 mg, ED 1 Time, Intravenous  nitroGLYCERIN 2% TD oint ointment 1 inch, Every 6 hours, Topical (Top)  hydrALAZINE injection 20 mg, Every 4 hours PRN, Intravenous  furosemide injection 40 mg, Every 12 hours, Intravenous  metoprolol succinate (TOPROL-XL) 24 hr tablet 25 mg, Daily, Oral    Plan  - BP is controlled, no changes needed to their regimen  -

## 2025-02-28 VITALS
WEIGHT: 150 LBS | BODY MASS INDEX: 23.54 KG/M2 | RESPIRATION RATE: 18 BRPM | TEMPERATURE: 98 F | HEIGHT: 67 IN | OXYGEN SATURATION: 99 % | HEART RATE: 94 BPM | DIASTOLIC BLOOD PRESSURE: 82 MMHG | SYSTOLIC BLOOD PRESSURE: 121 MMHG

## 2025-02-28 LAB
ANION GAP SERPL CALC-SCNC: 6 MEQ/L (ref 2–13)
BASOPHILS # BLD AUTO: 0.02 X10(3)/MCL (ref 0.01–0.08)
BASOPHILS NFR BLD AUTO: 0.3 % (ref 0.1–1.2)
BUN SERPL-MCNC: 18 MG/DL (ref 7–20)
CALCIUM SERPL-MCNC: 8.4 MG/DL (ref 8.4–10.2)
CHLORIDE SERPL-SCNC: 94 MMOL/L (ref 98–110)
CO2 SERPL-SCNC: 33 MMOL/L (ref 21–32)
CREAT SERPL-MCNC: 1.2 MG/DL (ref 0.66–1.25)
CREAT/UREA NIT SERPL: 15 (ref 12–20)
EOSINOPHIL # BLD AUTO: 0.14 X10(3)/MCL (ref 0.04–0.54)
EOSINOPHIL NFR BLD AUTO: 2 % (ref 0.7–7)
ERYTHROCYTE [DISTWIDTH] IN BLOOD BY AUTOMATED COUNT: 14.7 %
FERRITIN SERPL-MCNC: 124 NG/ML (ref 17.9–464)
FOLATE SERPL-MCNC: 7.1 NG/ML (ref 2.8–20)
GFR SERPLBLD CREATININE-BSD FMLA CKD-EPI: 65 ML/MIN/1.73/M2
GLUCOSE SERPL-MCNC: 98 MG/DL (ref 70–115)
HCT VFR BLD AUTO: 40.2 % (ref 36–52)
HGB BLD-MCNC: 13.7 G/DL (ref 13–18)
IMM GRANULOCYTES # BLD AUTO: 0.01 X10(3)/MCL (ref 0–0.03)
IMM GRANULOCYTES NFR BLD AUTO: 0.1 % (ref 0–0.5)
IRON SATN MFR SERPL: 24 % (ref 20–50)
IRON SERPL-MCNC: 61 UG/DL (ref 65–175)
LYMPHOCYTES # BLD AUTO: 1.62 X10(3)/MCL (ref 1.32–3.57)
LYMPHOCYTES NFR BLD AUTO: 22.9 % (ref 20–55)
MCH RBC QN AUTO: 30.9 PG (ref 27–34)
MCHC RBC AUTO-ENTMCNC: 34.1 G/DL (ref 31–37)
MCV RBC AUTO: 90.5 FL (ref 79–99)
MONOCYTES # BLD AUTO: 0.55 X10(3)/MCL (ref 0.3–0.82)
MONOCYTES NFR BLD AUTO: 7.8 % (ref 4.7–12.5)
NEUTROPHILS # BLD AUTO: 4.72 X10(3)/MCL (ref 1.78–5.38)
NEUTROPHILS NFR BLD AUTO: 66.9 % (ref 37–73)
NRBC BLD AUTO-RTO: 0 %
PLATELET # BLD AUTO: 296 X10(3)/MCL (ref 140–371)
PMV BLD AUTO: 9.6 FL (ref 9.4–12.4)
POTASSIUM SERPL-SCNC: 4.3 MMOL/L (ref 3.5–5.1)
RBC # BLD AUTO: 4.44 X10(6)/MCL (ref 4–6)
SODIUM SERPL-SCNC: 133 MMOL/L (ref 136–145)
TIBC SERPL-MCNC: 197 UG/DL (ref 60–240)
TIBC SERPL-MCNC: 258 UG/DL (ref 250–450)
TRANSFERRIN SERPL-MCNC: 237 MG/DL (ref 163–344)
VIT B12 SERPL-MCNC: 775 PG/ML (ref 211–946)
WBC # BLD AUTO: 7.06 X10(3)/MCL (ref 4–11.5)

## 2025-02-28 PROCEDURE — 25000003 PHARM REV CODE 250: Performed by: FAMILY MEDICINE

## 2025-02-28 PROCEDURE — 83550 IRON BINDING TEST: CPT | Performed by: FAMILY MEDICINE

## 2025-02-28 PROCEDURE — S4991 NICOTINE PATCH NONLEGEND: HCPCS | Performed by: FAMILY MEDICINE

## 2025-02-28 PROCEDURE — 36415 COLL VENOUS BLD VENIPUNCTURE: CPT | Performed by: INTERNAL MEDICINE

## 2025-02-28 PROCEDURE — 25000003 PHARM REV CODE 250: Performed by: INTERNAL MEDICINE

## 2025-02-28 PROCEDURE — 63600175 PHARM REV CODE 636 W HCPCS: Performed by: NURSE PRACTITIONER

## 2025-02-28 PROCEDURE — 82607 VITAMIN B-12: CPT | Performed by: FAMILY MEDICINE

## 2025-02-28 PROCEDURE — 94761 N-INVAS EAR/PLS OXIMETRY MLT: CPT

## 2025-02-28 PROCEDURE — 85025 COMPLETE CBC W/AUTO DIFF WBC: CPT | Performed by: INTERNAL MEDICINE

## 2025-02-28 PROCEDURE — 25000003 PHARM REV CODE 250: Performed by: NURSE PRACTITIONER

## 2025-02-28 PROCEDURE — 80048 BASIC METABOLIC PNL TOTAL CA: CPT | Performed by: INTERNAL MEDICINE

## 2025-02-28 PROCEDURE — 82746 ASSAY OF FOLIC ACID SERUM: CPT | Performed by: FAMILY MEDICINE

## 2025-02-28 PROCEDURE — 82728 ASSAY OF FERRITIN: CPT | Performed by: FAMILY MEDICINE

## 2025-02-28 RX ORDER — IBUPROFEN 200 MG
1 TABLET ORAL DAILY
Qty: 30 PATCH | Refills: 1 | Status: SHIPPED | OUTPATIENT
Start: 2025-02-28

## 2025-02-28 RX ORDER — CARVEDILOL 3.12 MG/1
3.12 TABLET ORAL 2 TIMES DAILY
Qty: 180 TABLET | Refills: 3 | Status: SHIPPED | OUTPATIENT
Start: 2025-02-28 | End: 2026-02-28

## 2025-02-28 RX ADMIN — SACUBITRIL AND VALSARTAN 1 TABLET: 24; 26 TABLET, FILM COATED ORAL at 09:02

## 2025-02-28 RX ADMIN — NITROGLYCERIN 1 INCH: 20 OINTMENT TOPICAL at 12:02

## 2025-02-28 RX ADMIN — MELOXICAM 7.5 MG: 7.5 TABLET ORAL at 09:02

## 2025-02-28 RX ADMIN — CARVEDILOL 3.12 MG: 3.12 TABLET, FILM COATED ORAL at 09:02

## 2025-02-28 RX ADMIN — ESCITALOPRAM OXALATE 10 MG: 10 TABLET ORAL at 09:02

## 2025-02-28 RX ADMIN — ASPIRIN 81 MG: 81 TABLET, COATED ORAL at 09:02

## 2025-02-28 RX ADMIN — NICOTINE 1 PATCH: 21 PATCH, EXTENDED RELEASE TRANSDERMAL at 09:02

## 2025-02-28 RX ADMIN — PANTOPRAZOLE SODIUM 40 MG: 40 TABLET, DELAYED RELEASE ORAL at 09:02

## 2025-02-28 RX ADMIN — FUROSEMIDE 40 MG: 10 INJECTION, SOLUTION INTRAMUSCULAR; INTRAVENOUS at 09:02

## 2025-02-28 RX ADMIN — NITROGLYCERIN 1 INCH: 20 OINTMENT TOPICAL at 05:02

## 2025-02-28 RX ADMIN — RIVAROXABAN 15 MG: 15 TABLET, FILM COATED ORAL at 09:02

## 2025-02-28 RX ADMIN — BUPROPION HYDROCHLORIDE 150 MG: 150 TABLET, EXTENDED RELEASE ORAL at 09:02

## 2025-02-28 NOTE — DISCHARGE SUMMARY
[facility]/Surg  Hospital Medicine  Discharge Summary      Patient Name: William South  MRN: [id number]  [facility]: [id number]  Patient Class: IP- Inpatient  Admission Date: 2/26/2025  Hospital Length of Stay: 1 days  Discharge Date and Time: No discharge date for patient encounter.  Attending Physician: Racquel Soares MD   Discharging Provider: Racquel Soares MD  Primary Care Provider: Bette Soliz NP    Primary Care Team: Networked reference to record PCT     HPI:     Chief Complaint: leg swelling  HPI:   Patient is a 68yo male with pmhx htn who presents with a dyspnea on exertion x2 days.  He also relates lower extremity swelling.  States he has had this before.  He states I get fluid buildup in my lungs.  No coronary interventions that he relates.  The patient is a an occasional smoker and a very poor historian.  Knows he sees a primary care physician but can not recall her name.  He denies chest pain nausea or vomiting.  He also relates complaining of constipation and mild vague abdominal discomfort no nausea or vomiting. He is admitted for chf exacerbation and PE. Per cardiology seen on consult November 5, 2024 for hypertension and symptoms of congestive heart failure. He had an echocardiogram and a PET scan. His PET scan was consistent with ischemia showing a reversible defect in the apical apical inferior and apical lateral segments. Echocardiogram showed an EF of 25% with 3+ MR 1 to 2+ TR. PA pressures are markedly elevated at 60. He never returned for a follow up visit he was also recently in the hospital for congestive heart failure and cocaine abuse was diuresed and discharged. Admission was a signout from the day hospitalist.       * No surgery found *      Hospital Course:   02/27/2025 pt presented with increased LE swelling, pt found to have large right main and RLL segmental PE x 2.  He is a poor historian, seems to be taking meds since last d/c, f/u with PCP, but did not see  cardiology yet.  Known h/o % and Severe MR 3+ on echo, has not f/u with [facility] since all these tests were done, PA pressures elevated 60.    Pt c/o some sob and his home health nurse told him to come to ER yesterday.    02/28/2025 DISCHARGE SUMMARY: pt admitted with LE edema found to have DVT< h/o CHF last EF was 25% back in Nov, repeat now 5-10% on repeat echo aslo has multiple bilateral PE, starte don lovenox and transitioned to Xarelto.  He has a f/u appointmetn with [facility] on 03/03/2025 he missed his appointment on 02/17/2025 after the last hospital stay.  He will also f/u with PCP.  New meds are discussed with pt changed to entresto and coreg and added xarelto, will resume home health to help manage his new medications.     Goals of Care Treatment Preferences:  Code Status: Full Code      SDOH Screening:  The patient was unable to be screened for utility difficulties, food insecurity, transport difficulties, housing insecurity, and interpersonal safety, so no concerns could be identified this admission.     Consults:   Consults (From admission, onward)          Status Ordering Provider     Inpatient consult to Telemedicine-Card  Once        Provider:  (Not yet assigned)    Acknowledged HENRIQUE LUCIANO            * Multiple subsegmental pulmonary emboli without acute cor pulmonale  Echo and LE us this am  Xarelto  D/c lovenox  Discussed with pt importance of med compliance and f/u      Acute hypoxic respiratory failure  Patient with Hypoxic Respiratory failure which is Acute.  he is not on home oxygen. Supplemental oxygen was provided and noted-      .   Signs/symptoms of respiratory failure include- tachypnea and increased work of breathing. Contributing diagnoses includes - CHF, COPD, and Pulmonary Embolus Labs and images were reviewed. Patient Has not had a recent ABG. Will treat underlying causes and adjust management of respiratory failure as follows- continue oxygen  Get echo  Xarelto for PE    Severe  "systolic congestive heart failure  Patient has Systolic (HFrEF) heart failure that is Acute on chronic. On presentation their CHF was decompensated. Evidence of decompensated CHF on presentation includes: edema, dyspnea on exertion (NAVA), and shortness of breath. The etiology of their decompensation is likely dietary indiscretion, increased fluid intake, and medication non-compliance. Most recent BNP and echo results are listed below.  No results for input(s): "BNP" in the last 72 hours.  Latest ECHO  No results found for this or any previous visit.    Current Heart Failure Medications  furosemide injection 60 mg, ED 1 Time, Intravenous  hydrALAZINE injection 20 mg, Every 4 hours PRN, Intravenous  furosemide injection 40 mg, Every 12 hours, Intravenous  sacubitriL-valsartan 24-26 mg per tablet 1 tablet, 2 times daily, Oral  metoprolol succinate (TOPROL-XL) 24 hr tablet 25 mg, Daily, Oral    Plan  - Monitor strict I&Os and daily weights.    - Place on telemetry  - Low sodium diet  - Place on fluid restriction of 1.5 L.   - Cardiology has been consulted  - The patient's volume status is stable but not at their baseline as indicated by orthopnea, paroxysmal nocturnal dyspnea (PND), dyspnea on exertion (NAVA), and shortness of breath  -          Mitral regurgitation  Echocardiogram with evidence of mitral regurgitation that is severe . The patient's most recent echocardiogram result is listed below. We will manage the valvular abnormality by  [facility] f/u    No echocardiogram results found for the past 12 months     Primary hypertension  Patient's blood pressure range in the last 24 hours was: BP  Min: 107/83  Max: 140/105.The patient's inpatient anti-hypertensive regimen is listed below:  Current Antihypertensives  furosemide injection 60 mg, ED 1 Time, Intravenous  nitroGLYCERIN 2% TD oint ointment 1 inch, Every 6 hours, Topical (Top)  hydrALAZINE injection 20 mg, Every 4 hours PRN, Intravenous  furosemide injection 40 mg, " Every 12 hours, Intravenous  metoprolol succinate (TOPROL-XL) 24 hr tablet 25 mg, Daily, Oral    Plan  - BP is controlled, no changes needed to their regimen  -      Pulmonary hypertension  Followed by [facility]      Anemia  Anemia is likely due to Iron deficiency. Most recent hemoglobin and hematocrit are listed below.  Recent Labs     02/26/25  1539 02/27/25  0515   HGB 13.7 13.9   HCT 40.7 41.8     Plan  - Monitor serial CBC: Daily  - Transfuse PRBC if patient becomes hemodynamically unstable, symptomatic or H/H drops below 7/21.  - Patient has not received any PRBC transfusions to date  - Patient's anemia is currently stable  - will need to monitor on xarelto    Substance abuse  Discussed with pt improtance to d/c cocaine use      Cocaine abuse  Discussed with pt regarding ongoing cardiac issues significantly worsened with cocaine use      Tobacco abuse disorder  nicoderm        Final Active Diagnoses:    Diagnosis Date Noted POA    PRINCIPAL PROBLEM:  Multiple subsegmental pulmonary emboli without acute cor pulmonale [I26.94] 02/27/2025 Yes    Acute hypoxic respiratory failure [J96.01] 02/27/2025 Yes    Severe systolic congestive heart failure [I50.20] 12/25/2024 Yes    Mitral regurgitation [I34.0] 02/27/2025 Yes    Primary hypertension [I10] 12/22/2024 Yes     Chronic    Anemia [D64.9] 02/27/2025 Yes    Pulmonary hypertension [I27.20] 02/27/2025 Yes    Substance abuse [F19.10] 12/22/2024 Yes    Cocaine abuse [F14.10] 02/05/2025 Yes    Tobacco abuse disorder [Z72.0] 12/21/2024 Yes      Problems Resolved During this Admission:       Discharged Condition: good    Disposition: Home or Self Care    Follow Up:   Contact information for follow-up providers       Bertrand Rooney MD Follow up.    Specialty: Cardiology  Why: Appointment on 3/3/25 at 8:20am.  [phone]  Contact information:  [address] [place] [address]  [address]  [place] [address]  [phone]               Rosteet, Bette A., NP Follow up.    Specialty: Family  Medicine  Contact information:  [address] [facility] [address]  [phone]                       Contact information for after-discharge care       Home Medical Care       [facility] .    Service: Home Health Services  Contact information:  [address], [address]  [facility] [address] [phone]                                 Patient Instructions:      Ambulatory referral/consult to Smoking Cessation Program   Standing Status: Future   Referral Priority: Routine Referral Type: Consultation   Referral Reason: Specialty Services Required   Requested Specialty: CTTS   Number of Visits Requested: 1     Activity as tolerated       Significant Diagnostic Studies: Labs: CMP   Recent Labs   Lab 02/26/25  1536 02/27/25  0515 02/28/25  0458   * 134* 133*   K 4.0 4.1 4.3   CL 96* 99 94*   CO2 31 31 33*   BUN 18 21* 18   CREATININE 1.10 1.17 1.20   CALCIUM 8.3* 8.5 8.4   ALBUMIN 3.3* 2.9*  --    BILITOT 1.0 1.0  --    ALKPHOS 95 92  --    AST 81* 37  --    ALT 78* 64*  --     and CBC   Recent Labs   Lab 02/26/25  1539 02/27/25  0515 02/28/25  0458   WBC 8.83 7.27 7.06   HGB 13.7 13.9 13.7   HCT 40.7 41.8 40.2    257 296       Pending Diagnostic Studies:       Procedure Component Value Units Date/Time    Iron and TIBC [[id number]] Collected: 02/28/25 0458    Order Status: Sent Lab Status: In process Updated: 02/28/25 0547    Specimen: Blood     [place] GENERIC ORDERABLE HSTNI [[id number]] Collected: 02/26/25 2218    Order Status: Sent Lab Status: In process Updated: 02/27/25 1543    Specimen: Other from Blood     Occult Blood, Stool 2nd Specimen [[id number]]     Order Status: Sent Lab Status: No result     Specimen: Stool     Occult Blood, Stool, Diagnostic (1-3) [[id number]] Collected: 02/27/25 1545    Order Status: Sent Lab Status: In process Updated: 02/27/25 1631    Specimen: Stool     Narrative:      The following orders were created for panel order Occult Blood, Stool, Diagnostic  (1-3).  Procedure                               Abnormality         Status                     ---------                               -----------         ------                     Occult Blood, Stool 1st...[[id number]]                      Final result               Occult Blood, Stool 2nd...[[id number]]                                                   Please view results for these tests on the individual orders.           Medications:  Reconciled Home Medications:      Medication List        START taking these medications      carvediloL 3.125 MG tablet  Commonly known as: COREG  Take 1 tablet (3.125 mg total) by mouth 2 (two) times daily.     nicotine 21 mg/24 hr  Commonly known as: NICODERM CQ  Place 1 patch onto the skin once daily.     * rivaroxaban 15 mg Tab  Commonly known as: XARELTO  Take 1 tablet (15 mg total) by mouth 2 (two) times daily with meals.     * rivaroxaban 20 mg Tab  Commonly known as: XARELTO  Take 1 tablet (20 mg total) by mouth daily with dinner or evening meal.     sacubitriL-valsartan 24-26 mg per tablet  Commonly known as: ENTRESTO  Take 1 tablet by mouth 2 (two) times daily.           * This list has 2 medication(s) that are the same as other medications prescribed for you. Read the directions carefully, and ask your doctor or other care provider to review them with you.                CONTINUE taking these medications      aspirin 81 MG Chew  Take 1 tablet by mouth every morning.     atorvastatin 40 MG tablet  Commonly known as: LIPITOR  Take 40 mg by mouth every evening.     buPROPion 150 MG TB24 tablet  Commonly known as: WELLBUTRIN XL  Take 1 tablet (150 mg total) by mouth once daily.     EScitalopram oxalate 10 MG tablet  Commonly known as: LEXAPRO  Take 1 tablet (10 mg total) by mouth once daily.     furosemide 20 MG tablet  Commonly known as: LASIX  Take 1 tablet (20 mg total) by mouth 2 (two) times daily.     meloxicam 7.5 MG tablet  Commonly known as: MOBIC  Take 7.5 mg by  mouth once daily.     metoprolol succinate 25 MG 24 hr tablet  Commonly known as: TOPROL-XL  Take 1 tablet by mouth every morning.     pantoprazole 40 MG tablet  Commonly known as: PROTONIX  Take 1 tablet by mouth every morning.            STOP taking these medications      lisinopriL 20 MG tablet  Commonly known as: PRINIVIL,ZESTRIL              Indwelling Lines/Drains at time of discharge:   Lines/Drains/Airways       None                   Time spent on the discharge of patient: 37 minutes     Patient Screened for food insecurity, housing instability, transportation needs, utility difficulties, and interpersonal safety.  No needs identified    Physical Exam  Vitals and nursing note reviewed.   Constitutional:       General: He is not in acute distress.     Appearance: Normal appearance. He is normal weight. He is not ill-appearing.   HENT:      Head: Normocephalic and atraumatic.   Cardiovascular:      Rate and Rhythm: Normal rate and regular rhythm.      Pulses: Normal pulses.      Heart sounds: Normal heart sounds.   Pulmonary:      Effort: Pulmonary effort is normal.      Breath sounds: Normal breath sounds.   Abdominal:      General: Abdomen is flat. Bowel sounds are normal.      Palpations: Abdomen is soft.   Skin:     General: Skin is warm and dry.      Findings: No erythema or rash.   Neurological:      Mental Status: He is alert.   Psychiatric:      Comments: I had a face to face encounter with this patient prior to discharging        Pt refused to talk to substance abuse navigator this am prior to transfer.    Racquel Soares MD  Department of Hospital Medicine  [facility]/Surg

## 2025-02-28 NOTE — PLAN OF CARE
Physician ordered to discharge patient home. Pt's home health care provider ( [facility] ) was notified per phone/fax of pt's discharge, spoke with Van. [facility] nurse to resume home visits.

## 2025-02-28 NOTE — DISCHARGE INSTRUCTIONS
-Limit activity for today.  -Take medications as prescribed.  -Keep follow-up appointment  -Call your Doctor if you experience:  Signs of heart attack, which may include:  Severe chest pain, pressure, or discomfort with:  Breathing trouble, sweating, upset stomach; or cold, clammy skin.  Pain in your arms, back, or jaw.  Worse pain with activity like walking up stairs.  Fast or irregular heartbeat.  Feeling dizzy, faint, or weak.  You are having so much trouble breathing that you can only say one or two words at a time.  You need to sit upright at all times to be able to breathe and/or cannot lie down.  You have trouble breathing when talking or sitting still.  Your shortness of breath has not gotten better after a few days.  You are coughing up blood.  You have a fever of 100.4°F (38°C) or higher or chills, even after taking your medicines.  Your symptoms are not getting better in 3 to 4 days.  You are still coughing in 3 to 4 weeks.You are having so much trouble breathing that you can only say one or two words at a time.  You need to sit upright at all times to be able to breathe and/or cannot lie down.  You have trouble breathing when talking or sitting still.  Sudden chest tightness or pain, often sharp or knife-like, when taking a deep breath  Sudden cough, possibly coughing up blood or bloody mucus  Sudden shortness of breath or rapid breathing  Rapid heart rate  Cold, clammy skin and sweating  Blue skin color  Fainting  Anxiety  If you are on blood thinners, call the doctor if you can't stop any bleeding.

## 2025-02-28 NOTE — NURSING
Patient was provided with discharge education focusing on activity, diet, worsening signs/symptoms, taking medications, and keeping follow-up appointment per SAMUEL Vazquez. Patient verbalized understanding and denies further questions at this time.Patient discharged from the floor via wheelchair accompanied by transport personnel. Patient in stable condition and denies further needs at this time.

## 2025-02-28 NOTE — PLAN OF CARE
02/28/25 1121   Medicare Message   Important Message from Medicare regarding Discharge Appeal Rights Given to patient/caregiver;Explained to patient/caregiver;Signed/date by patient/caregiver   Date IMM was signed 02/27/25   Time IMM was signed [id number]

## 2025-02-28 NOTE — PLAN OF CARE
02/28/25 1123   Final Note   Assessment Type Final Discharge Note   Anticipated Discharge Disposition Home-Health  ([facility])   What phone number can be called within the next 1-3 days to see how you are doing after discharge? [phone]   Hospital Resources/Appts/Education Provided Post-Acute resouces added to AVS;Appointments scheduled and added to AVS   Post-Acute Status   Post-Acute Authorization Home Health   Home Health Status Set-up Complete/Auth obtained   Coverage [facility] Managed Medicare Dual Complete   Discharge Delays None known at this time

## 2025-03-03 ENCOUNTER — TELEPHONE (OUTPATIENT)
Dept: FAMILY MEDICINE | Facility: CLINIC | Age: 70
End: 2025-03-03
Payer: MEDICARE

## 2025-03-03 LAB — MAYO GENERIC ORDERABLE RESULT: NORMAL

## 2025-03-03 NOTE — TELEPHONE ENCOUNTER
Monse, Who was his appt with today? Why did he refuse to get out of care? When was the last time he used cocaine? Can she be home to let home health in?  Is he SI/HI?

## 2025-03-03 NOTE — TELEPHONE ENCOUNTER
I spoke with patients sister and she stated patient is being non complaint with everything. She stated he had an appt today and she brought him and he refused to go in. She stated he is not taking care of himself and she is about to take over all of his care and try to get him back on track. She stated it is hard when all he wants to do is be non complaint. I explained how important it was to continue having home health come and check on him and to keep all his appointments. She informed me she did not know much about his medications and what all he needs nor about his spot on his hip. She stated she will call If she needs help with anything, but she was going to see him soon and go over everything including his medications.

## 2025-03-03 NOTE — TELEPHONE ENCOUNTER
Called and left Ms Mao his sister a vm to call me back. Will try again before leaving for the day.    [facility]

## 2025-03-13 NOTE — PHYSICIAN QUERY
Due to the conflicting clinical picture, please clinically validate the diagnosis of acute hypoxic respiratory failure. If validated, please provide additional clinical support for the diagnosis.  The respiratory condition is confirmed. Additional clinical support/decision making indicators for the diagnosis include (please specify): sob, hypoxia, PE

## 2025-03-17 ENCOUNTER — HOSPITAL ENCOUNTER (INPATIENT)
Facility: HOSPITAL | Age: 70
LOS: 15 days | Discharge: HOSPICE | DRG: 291 | End: 2025-04-02
Attending: EMERGENCY MEDICINE | Admitting: FAMILY MEDICINE
Payer: MEDICARE

## 2025-03-17 DIAGNOSIS — F14.10 COCAINE ABUSE: ICD-10-CM

## 2025-03-17 DIAGNOSIS — I26.99 ACUTE PULMONARY EMBOLISM WITHOUT ACUTE COR PULMONALE, UNSPECIFIED PULMONARY EMBOLISM TYPE: ICD-10-CM

## 2025-03-17 DIAGNOSIS — R06.00 DYSPNEA: ICD-10-CM

## 2025-03-17 DIAGNOSIS — J90 PLEURAL EFFUSION ON RIGHT: ICD-10-CM

## 2025-03-17 DIAGNOSIS — Z91.199 HISTORY OF NONCOMPLIANCE WITH MEDICAL TREATMENT: ICD-10-CM

## 2025-03-17 DIAGNOSIS — I50.43 ACUTE ON CHRONIC COMBINED SYSTOLIC AND DIASTOLIC CONGESTIVE HEART FAILURE: Primary | ICD-10-CM

## 2025-03-17 LAB
ALBUMIN SERPL-MCNC: 3.9 G/DL (ref 3.4–5)
ALBUMIN/GLOB SERPL: 1.6 RATIO
ALP SERPL-CCNC: 127 UNIT/L (ref 50–144)
ALT SERPL-CCNC: 64 UNIT/L (ref 1–45)
AMPHET UR QL SCN: NEGATIVE
ANION GAP SERPL CALC-SCNC: 7 MEQ/L (ref 2–13)
AST SERPL-CCNC: 52 UNIT/L (ref 17–59)
BACTERIA #/AREA URNS AUTO: ABNORMAL /HPF
BARBITURATE SCN PRESENT UR: NEGATIVE
BASOPHILS # BLD AUTO: 0.01 X10(3)/MCL (ref 0.01–0.08)
BASOPHILS NFR BLD AUTO: 0.2 % (ref 0.1–1.2)
BENZODIAZ UR QL SCN: NEGATIVE
BILIRUB SERPL-MCNC: 1.4 MG/DL (ref 0–1)
BILIRUB UR QL STRIP.AUTO: ABNORMAL
BNP BLD-MCNC: ABNORMAL PG/ML (ref 0–124.9)
BUN SERPL-MCNC: 27 MG/DL (ref 7–20)
CALCIUM SERPL-MCNC: 9 MG/DL (ref 8.4–10.2)
CANNABINOIDS UR QL SCN: NEGATIVE
CHLORIDE SERPL-SCNC: 94 MMOL/L (ref 98–110)
CLARITY UR: CLEAR
CO2 SERPL-SCNC: 28 MMOL/L (ref 21–32)
COCAINE UR QL SCN: POSITIVE
COLOR UR AUTO: YELLOW
CREAT SERPL-MCNC: 1.34 MG/DL (ref 0.66–1.25)
CREAT/UREA NIT SERPL: 20 (ref 12–20)
D DIMER PPP IA.FEU-MCNC: 3.05 MG/L FEU (ref 0.19–0.5)
EOSINOPHIL # BLD AUTO: 0.03 X10(3)/MCL (ref 0.04–0.54)
EOSINOPHIL NFR BLD AUTO: 0.5 % (ref 0.7–7)
ERYTHROCYTE [DISTWIDTH] IN BLOOD BY AUTOMATED COUNT: 15 %
GFR SERPLBLD CREATININE-BSD FMLA CKD-EPI: 57 ML/MIN/1.73/M2
GLOBULIN SER-MCNC: 2.5 GM/DL (ref 2–3.9)
GLUCOSE SERPL-MCNC: 99 MG/DL (ref 70–115)
GLUCOSE UR QL STRIP: NEGATIVE
HCT VFR BLD AUTO: 45.4 % (ref 36–52)
HGB BLD-MCNC: 15.6 G/DL (ref 13–18)
HGB UR QL STRIP: ABNORMAL
HYALINE CASTS URNS QL MICRO: ABNORMAL /LPF
IMM GRANULOCYTES # BLD AUTO: 0.02 X10(3)/MCL (ref 0–0.03)
IMM GRANULOCYTES NFR BLD AUTO: 0.3 % (ref 0–0.5)
KETONES UR QL STRIP: ABNORMAL
LEUKOCYTE ESTERASE UR QL STRIP: ABNORMAL
LYMPHOCYTES # BLD AUTO: 1.77 X10(3)/MCL (ref 1.32–3.57)
LYMPHOCYTES NFR BLD AUTO: 28.1 % (ref 20–55)
MCH RBC QN AUTO: 31.2 PG (ref 27–34)
MCHC RBC AUTO-ENTMCNC: 34.4 G/DL (ref 31–37)
MCV RBC AUTO: 90.8 FL (ref 79–99)
METHADONE UR QL SCN: NEGATIVE
MONOCYTES # BLD AUTO: 0.5 X10(3)/MCL (ref 0.3–0.82)
MONOCYTES NFR BLD AUTO: 7.9 % (ref 4.7–12.5)
NEUTROPHILS # BLD AUTO: 3.98 X10(3)/MCL (ref 1.78–5.38)
NEUTROPHILS NFR BLD AUTO: 63 % (ref 37–73)
NITRITE UR QL STRIP: NEGATIVE
NRBC BLD AUTO-RTO: 0 %
OHS QRS DURATION: 150 MS
OHS QTC CALCULATION: 516 MS
OPIATES UR QL SCN: NEGATIVE
PCP UR QL: NEGATIVE
PH UR STRIP: 6 [PH]
PH UR: 6 [PH] (ref 5–8)
PLATELET # BLD AUTO: 238 X10(3)/MCL (ref 140–371)
PMV BLD AUTO: 9.3 FL (ref 9.4–12.4)
POTASSIUM SERPL-SCNC: 4.4 MMOL/L (ref 3.5–5.1)
PROT SERPL-MCNC: 6.4 GM/DL (ref 6.3–8.2)
PROT UR QL STRIP: 100
RBC # BLD AUTO: 5 X10(6)/MCL (ref 4–6)
RBC #/AREA URNS AUTO: ABNORMAL /HPF
SODIUM SERPL-SCNC: 129 MMOL/L (ref 136–145)
SP GR UR STRIP.AUTO: >=1.03 (ref 1–1.03)
SQUAMOUS #/AREA URNS AUTO: ABNORMAL /HPF
TROPONIN I SERPL-MCNC: <0.012 NG/ML (ref 0–0.03)
UROBILINOGEN UR STRIP-ACNC: 1
WBC # BLD AUTO: 6.31 X10(3)/MCL (ref 4–11.5)
WBC #/AREA URNS AUTO: ABNORMAL /HPF

## 2025-03-17 PROCEDURE — 25000003 PHARM REV CODE 250: Performed by: INTERNAL MEDICINE

## 2025-03-17 PROCEDURE — 85379 FIBRIN DEGRADATION QUANT: CPT | Performed by: EMERGENCY MEDICINE

## 2025-03-17 PROCEDURE — 80053 COMPREHEN METABOLIC PANEL: CPT | Performed by: EMERGENCY MEDICINE

## 2025-03-17 PROCEDURE — 83880 ASSAY OF NATRIURETIC PEPTIDE: CPT | Performed by: EMERGENCY MEDICINE

## 2025-03-17 PROCEDURE — 93010 ELECTROCARDIOGRAM REPORT: CPT | Mod: ,,, | Performed by: INTERNAL MEDICINE

## 2025-03-17 PROCEDURE — 93005 ELECTROCARDIOGRAM TRACING: CPT

## 2025-03-17 PROCEDURE — 25000003 PHARM REV CODE 250: Performed by: EMERGENCY MEDICINE

## 2025-03-17 PROCEDURE — 85025 COMPLETE CBC W/AUTO DIFF WBC: CPT | Performed by: EMERGENCY MEDICINE

## 2025-03-17 PROCEDURE — G0378 HOSPITAL OBSERVATION PER HR: HCPCS

## 2025-03-17 PROCEDURE — 25500020 PHARM REV CODE 255: Performed by: EMERGENCY MEDICINE

## 2025-03-17 PROCEDURE — 81015 MICROSCOPIC EXAM OF URINE: CPT | Performed by: EMERGENCY MEDICINE

## 2025-03-17 PROCEDURE — 99291 CRITICAL CARE FIRST HOUR: CPT

## 2025-03-17 PROCEDURE — 84484 ASSAY OF TROPONIN QUANT: CPT | Performed by: EMERGENCY MEDICINE

## 2025-03-17 PROCEDURE — 96374 THER/PROPH/DIAG INJ IV PUSH: CPT

## 2025-03-17 PROCEDURE — 94761 N-INVAS EAR/PLS OXIMETRY MLT: CPT

## 2025-03-17 PROCEDURE — 81003 URINALYSIS AUTO W/O SCOPE: CPT | Mod: 59 | Performed by: EMERGENCY MEDICINE

## 2025-03-17 PROCEDURE — 80307 DRUG TEST PRSMV CHEM ANLYZR: CPT | Performed by: EMERGENCY MEDICINE

## 2025-03-17 PROCEDURE — 96372 THER/PROPH/DIAG INJ SC/IM: CPT | Performed by: EMERGENCY MEDICINE

## 2025-03-17 PROCEDURE — 63600175 PHARM REV CODE 636 W HCPCS: Performed by: EMERGENCY MEDICINE

## 2025-03-17 PROCEDURE — 87086 URINE CULTURE/COLONY COUNT: CPT | Performed by: EMERGENCY MEDICINE

## 2025-03-17 RX ORDER — ATORVASTATIN CALCIUM 40 MG/1
40 TABLET, FILM COATED ORAL NIGHTLY
Status: DISCONTINUED | OUTPATIENT
Start: 2025-03-17 | End: 2025-04-02 | Stop reason: HOSPADM

## 2025-03-17 RX ORDER — FUROSEMIDE 10 MG/ML
40 INJECTION INTRAMUSCULAR; INTRAVENOUS DAILY
Status: DISCONTINUED | OUTPATIENT
Start: 2025-03-18 | End: 2025-03-20

## 2025-03-17 RX ORDER — HYDRALAZINE HYDROCHLORIDE 20 MG/ML
10 INJECTION INTRAMUSCULAR; INTRAVENOUS EVERY 6 HOURS PRN
Status: DISCONTINUED | OUTPATIENT
Start: 2025-03-17 | End: 2025-04-02 | Stop reason: HOSPADM

## 2025-03-17 RX ORDER — ACETAMINOPHEN 325 MG/1
650 TABLET ORAL EVERY 6 HOURS PRN
Status: DISCONTINUED | OUTPATIENT
Start: 2025-03-17 | End: 2025-04-02 | Stop reason: HOSPADM

## 2025-03-17 RX ORDER — FUROSEMIDE 10 MG/ML
20 INJECTION INTRAMUSCULAR; INTRAVENOUS
Status: COMPLETED | OUTPATIENT
Start: 2025-03-17 | End: 2025-03-17

## 2025-03-17 RX ORDER — FAMOTIDINE 20 MG/1
20 TABLET, FILM COATED ORAL DAILY
Status: DISCONTINUED | OUTPATIENT
Start: 2025-03-17 | End: 2025-03-22

## 2025-03-17 RX ORDER — HYDROCODONE BITARTRATE AND ACETAMINOPHEN 10; 325 MG/1; MG/1
1 TABLET ORAL EVERY 6 HOURS PRN
Refills: 0 | Status: DISCONTINUED | OUTPATIENT
Start: 2025-03-17 | End: 2025-04-02 | Stop reason: HOSPADM

## 2025-03-17 RX ORDER — ESCITALOPRAM OXALATE 10 MG/1
10 TABLET ORAL DAILY
Status: DISCONTINUED | OUTPATIENT
Start: 2025-03-18 | End: 2025-04-02 | Stop reason: HOSPADM

## 2025-03-17 RX ORDER — ONDANSETRON HYDROCHLORIDE 2 MG/ML
4 INJECTION, SOLUTION INTRAVENOUS EVERY 8 HOURS PRN
Status: DISCONTINUED | OUTPATIENT
Start: 2025-03-17 | End: 2025-04-02 | Stop reason: HOSPADM

## 2025-03-17 RX ORDER — TALC
6 POWDER (GRAM) TOPICAL NIGHTLY PRN
Status: DISCONTINUED | OUTPATIENT
Start: 2025-03-17 | End: 2025-04-02 | Stop reason: HOSPADM

## 2025-03-17 RX ORDER — CARVEDILOL 3.12 MG/1
3.12 TABLET ORAL 2 TIMES DAILY
Status: DISCONTINUED | OUTPATIENT
Start: 2025-03-17 | End: 2025-03-26

## 2025-03-17 RX ORDER — ENOXAPARIN SODIUM 100 MG/ML
1 INJECTION SUBCUTANEOUS ONCE
Status: COMPLETED | OUTPATIENT
Start: 2025-03-17 | End: 2025-03-17

## 2025-03-17 RX ORDER — SODIUM CHLORIDE 0.9 % (FLUSH) 0.9 %
10 SYRINGE (ML) INJECTION
Status: DISCONTINUED | OUTPATIENT
Start: 2025-03-17 | End: 2025-04-02 | Stop reason: HOSPADM

## 2025-03-17 RX ORDER — HYDRALAZINE HYDROCHLORIDE 20 MG/ML
10 INJECTION INTRAMUSCULAR; INTRAVENOUS EVERY 6 HOURS PRN
Status: DISCONTINUED | OUTPATIENT
Start: 2025-03-17 | End: 2025-03-17

## 2025-03-17 RX ORDER — ASPIRIN 81 MG/1
81 TABLET ORAL DAILY
Status: DISCONTINUED | OUTPATIENT
Start: 2025-03-18 | End: 2025-03-29

## 2025-03-17 RX ADMIN — FAMOTIDINE 20 MG: 20 TABLET, FILM COATED ORAL at 03:03

## 2025-03-17 RX ADMIN — ENOXAPARIN SODIUM 70 MG: 100 INJECTION SUBCUTANEOUS at 01:03

## 2025-03-17 RX ADMIN — Medication 6 MG: at 08:03

## 2025-03-17 RX ADMIN — SACUBITRIL AND VALSARTAN 1 TABLET: 24; 26 TABLET, FILM COATED ORAL at 08:03

## 2025-03-17 RX ADMIN — ATORVASTATIN CALCIUM 40 MG: 40 TABLET, FILM COATED ORAL at 08:03

## 2025-03-17 RX ADMIN — IOHEXOL 96 ML: 350 INJECTION, SOLUTION INTRAVENOUS at 12:03

## 2025-03-17 RX ADMIN — CARVEDILOL 3.12 MG: 3.12 TABLET, FILM COATED ORAL at 08:03

## 2025-03-17 RX ADMIN — HYDROCODONE BITARTRATE AND ACETAMINOPHEN 1 TABLET: 10; 325 TABLET ORAL at 08:03

## 2025-03-17 RX ADMIN — RIVAROXABAN 20 MG: 20 TABLET, FILM COATED ORAL at 06:03

## 2025-03-17 RX ADMIN — FUROSEMIDE 20 MG: 10 INJECTION, SOLUTION INTRAMUSCULAR; INTRAVENOUS at 12:03

## 2025-03-17 NOTE — Clinical Note
Diagnosis: Acute on chronic combined systolic and diastolic congestive heart failure [030559]   Future Attending Provider: NURA ESPINAL [[id number]]
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
## 2025-03-17 NOTE — PLAN OF CARE
Problem: Adult Inpatient Plan of Care  Goal: Plan of Care Review  3/17/2025 1631 by Cassy Stern RN  Outcome: Progressing  3/17/2025 1631 by Cassy Stern RN  Outcome: Progressing  Goal: Patient-Specific Goal (Individualized)  3/17/2025 1631 by Cassy Stern RN  Outcome: Progressing  3/17/2025 1631 by Cassy Stern RN  Outcome: Progressing  Goal: Absence of Hospital-Acquired Illness or Injury  3/17/2025 1631 by Cassy Stern RN  Outcome: Progressing  3/17/2025 1631 by Cassy Stern RN  Outcome: Progressing  Goal: Optimal Comfort and Wellbeing  3/17/2025 1631 by Cassy Stern RN  Outcome: Progressing  3/17/2025 1631 by Cassy Stern RN  Outcome: Progressing  Goal: Readiness for Transition of Care  3/17/2025 1631 by Csasy Stern RN  Outcome: Progressing  3/17/2025 1631 by Cassy Stern RN  Outcome: Progressing     Problem: Fall Injury Risk  Goal: Absence of Fall and Fall-Related Injury  3/17/2025 1631 by Cassy Stern RN  Outcome: Progressing  3/17/2025 1631 by Cassy Stern RN  Outcome: Progressing     Problem: Comorbidity Management  Goal: Maintenance of Heart Failure Symptom Control  3/17/2025 1631 by Cassy Stern RN  Outcome: Progressing  3/17/2025 1631 by Cassy Stern RN  Outcome: Progressing  Goal: Blood Pressure in Desired Range  3/17/2025 1631 by Cassy Stern RN  Outcome: Progressing  3/17/2025 1631 by Cassy Stern RN  Outcome: Progressing

## 2025-03-18 LAB
ANION GAP SERPL CALC-SCNC: 7 MEQ/L (ref 2–13)
BASOPHILS # BLD AUTO: 0.02 X10(3)/MCL (ref 0.01–0.08)
BASOPHILS NFR BLD AUTO: 0.4 % (ref 0.1–1.2)
BUN SERPL-MCNC: 31 MG/DL (ref 7–20)
CALCIUM SERPL-MCNC: 9 MG/DL (ref 8.4–10.2)
CHLORIDE SERPL-SCNC: 96 MMOL/L (ref 98–110)
CO2 SERPL-SCNC: 26 MMOL/L (ref 21–32)
CREAT SERPL-MCNC: 1.32 MG/DL (ref 0.66–1.25)
CREAT/UREA NIT SERPL: 23 (ref 12–20)
EOSINOPHIL # BLD AUTO: 0.05 X10(3)/MCL (ref 0.04–0.54)
EOSINOPHIL NFR BLD AUTO: 0.9 % (ref 0.7–7)
ERYTHROCYTE [DISTWIDTH] IN BLOOD BY AUTOMATED COUNT: 14.6 %
GFR SERPLBLD CREATININE-BSD FMLA CKD-EPI: 58 ML/MIN/1.73/M2
GLUCOSE SERPL-MCNC: 90 MG/DL (ref 70–115)
HCT VFR BLD AUTO: 44.2 % (ref 36–52)
HGB BLD-MCNC: 15.5 G/DL (ref 13–18)
IMM GRANULOCYTES # BLD AUTO: 0.01 X10(3)/MCL (ref 0–0.03)
IMM GRANULOCYTES NFR BLD AUTO: 0.2 % (ref 0–0.5)
LYMPHOCYTES # BLD AUTO: 2.32 X10(3)/MCL (ref 1.32–3.57)
LYMPHOCYTES NFR BLD AUTO: 43 % (ref 20–55)
MCH RBC QN AUTO: 30.9 PG (ref 27–34)
MCHC RBC AUTO-ENTMCNC: 35.1 G/DL (ref 31–37)
MCV RBC AUTO: 88 FL (ref 79–99)
MONOCYTES # BLD AUTO: 0.5 X10(3)/MCL (ref 0.3–0.82)
MONOCYTES NFR BLD AUTO: 9.3 % (ref 4.7–12.5)
NEUTROPHILS # BLD AUTO: 2.5 X10(3)/MCL (ref 1.78–5.38)
NEUTROPHILS NFR BLD AUTO: 46.2 % (ref 37–73)
NRBC BLD AUTO-RTO: 0 %
PLATELET # BLD AUTO: 243 X10(3)/MCL (ref 140–371)
PMV BLD AUTO: 9.9 FL (ref 9.4–12.4)
POTASSIUM SERPL-SCNC: 4.1 MMOL/L (ref 3.5–5.1)
RBC # BLD AUTO: 5.02 X10(6)/MCL (ref 4–6)
SODIUM SERPL-SCNC: 129 MMOL/L (ref 136–145)
WBC # BLD AUTO: 5.4 X10(3)/MCL (ref 4–11.5)

## 2025-03-18 PROCEDURE — 21400001 HC TELEMETRY ROOM

## 2025-03-18 PROCEDURE — 36415 COLL VENOUS BLD VENIPUNCTURE: CPT | Performed by: INTERNAL MEDICINE

## 2025-03-18 PROCEDURE — 11000001 HC ACUTE MED/SURG PRIVATE ROOM

## 2025-03-18 PROCEDURE — 97161 PT EVAL LOW COMPLEX 20 MIN: CPT

## 2025-03-18 PROCEDURE — 80048 BASIC METABOLIC PNL TOTAL CA: CPT | Performed by: INTERNAL MEDICINE

## 2025-03-18 PROCEDURE — 94761 N-INVAS EAR/PLS OXIMETRY MLT: CPT

## 2025-03-18 PROCEDURE — 85025 COMPLETE CBC W/AUTO DIFF WBC: CPT | Performed by: INTERNAL MEDICINE

## 2025-03-18 PROCEDURE — 25000003 PHARM REV CODE 250: Performed by: INTERNAL MEDICINE

## 2025-03-18 PROCEDURE — 63600175 PHARM REV CODE 636 W HCPCS: Performed by: INTERNAL MEDICINE

## 2025-03-18 PROCEDURE — 25000003 PHARM REV CODE 250: Performed by: EMERGENCY MEDICINE

## 2025-03-18 RX ADMIN — Medication 6 MG: at 08:03

## 2025-03-18 RX ADMIN — SACUBITRIL AND VALSARTAN 1 TABLET: 24; 26 TABLET, FILM COATED ORAL at 09:03

## 2025-03-18 RX ADMIN — FUROSEMIDE 40 MG: 10 INJECTION, SOLUTION INTRAMUSCULAR; INTRAVENOUS at 09:03

## 2025-03-18 RX ADMIN — RIVAROXABAN 20 MG: 20 TABLET, FILM COATED ORAL at 05:03

## 2025-03-18 RX ADMIN — SACUBITRIL AND VALSARTAN 1 TABLET: 24; 26 TABLET, FILM COATED ORAL at 08:03

## 2025-03-18 RX ADMIN — ATORVASTATIN CALCIUM 40 MG: 40 TABLET, FILM COATED ORAL at 08:03

## 2025-03-18 RX ADMIN — FAMOTIDINE 20 MG: 20 TABLET, FILM COATED ORAL at 09:03

## 2025-03-18 RX ADMIN — CARVEDILOL 3.12 MG: 3.12 TABLET, FILM COATED ORAL at 08:03

## 2025-03-18 RX ADMIN — ASPIRIN 81 MG: 81 TABLET ORAL at 09:03

## 2025-03-18 RX ADMIN — CARVEDILOL 3.12 MG: 3.12 TABLET, FILM COATED ORAL at 09:03

## 2025-03-18 RX ADMIN — HYDROCODONE BITARTRATE AND ACETAMINOPHEN 1 TABLET: 10; 325 TABLET ORAL at 08:03

## 2025-03-18 RX ADMIN — ESCITALOPRAM OXALATE 10 MG: 10 TABLET ORAL at 09:03

## 2025-03-18 NOTE — PLAN OF CARE
Problem: Physical Therapy  Goal: Physical Therapy Goal  Description: Goals to be met by: discharge     Patient will increase functional independence with mobility by performin. Supine to sit with Modified [place]  2. Sit to stand transfer with Modified [place]  3. Gait  x 150 feet with Modified [place] using Rolling Walker/no AD.     Outcome: Progressing

## 2025-03-18 NOTE — PLAN OF CARE
03/18/25 1529   Readmission   Was this a planned readmission? No   Why were you hospitalized in the last 30 days? CHF, PEs   Why were you readmitted? Alarmed about signs/symptoms   When you left the hospital how did you feel? Fine   When you left the hospital where did you go? Home with Home Health   Did patient/caregiver refused recommended DC plan? No   Tell me about what happened between when you left the hospital and the day you returned. Shortness of Breath, Leg swelling   When did you start not feeling well? Can't remember when he started feeling bad.   Did you try to manage your symptoms your self? No   Did you call anyone? No   Did you try to see or did see a doctor or nurse before you came? No   Did you have  a follow-up appointment on discharge? Yes   Did you go? No   Why?  --

## 2025-03-18 NOTE — PLAN OF CARE
Physician wrote orders for SNF placement. Referral made to [facility] christopher Abdi.  Rec'd message from Griselda @ [facility] stating they do not accept pt's insurance.

## 2025-03-18 NOTE — PLAN OF CARE
03/18/25 1118   Discharge Assessment   Assessment Type Discharge Planning Assessment   Confirmed/corrected address, phone number and insurance Yes   Confirmed Demographics Correct on Facesheet   Source of Information patient;health record   When was your last doctors appointment? 02/12/25   Communicated CHASIDY with patient/caregiver Yes   Reason For Admission CHF   People in Home significant other   Facility Arrived From: Home   Do you expect to return to your current living situation? No   Do you have help at home or someone to help you manage your care at home? No   Prior to hospitilization cognitive status: Unable to Assess   Current cognitive status:   (Alert, Confused)   Walking or Climbing Stairs Difficulty yes   Walking or Climbing Stairs ambulation difficulty, requires equipment   Mobility Management Cane   Dressing/Bathing Difficulty no   Equipment Currently Used at Home cane, straight   Readmission within 30 days? Yes  ([facility] 2/26-2/28/25 CHF, PE)   Do you currently have service(s) that help you manage your care at home? Yes   Name and Contact number of agency [facility] [phone]   Do you take prescription medications? Yes   Do you have prescription coverage? Yes   Coverage [facility] Managed MCR Dual Complete   Do you have any problems affording any of your prescribed medications? No   Is the patient taking medications as prescribed? no   If no, which medications is patient not taking? Patient states he is not very good at taking his medications   Who is going to help you get home at discharge? Sister   How do you get to doctors appointments? family or friend will provide   Are you on dialysis? No   Do you take coumadin? No   Discharge Plan A Skilled Nursing Facility   Discharge Plan B Home Health   DME Needed Upon Discharge  none   Discharge Plan discussed with: Patient;Sibling   Name(s) and Number(s) Daylin-Sister [phone]   Transition of Care Barriers Mobility;No family/friends to  help;Substance Abuse;Social;Does not adhere to care plan   Physical Activity   On average, how many days per week do you engage in moderate to strenuous exercise (like a brisk walk)? 0 days   On average, how many minutes do you engage in exercise at this level? 0 min     Discussed with patient the importance of taking medications as prescribed and going to doctors appointment.  Patient states he is not very good at taking his medications.  He states that he has to rely on family to bring him to doctor appointments.  When questioned why his sister didn't bring him to see  on 3/3/25.  He states that his sister had an appointment on the same day.  Patient seems confused on when his appointment is with his PCP.  I questioned patient  whether he would like to see the Substance Abuse Navigator concerning the (+) cocaine on his UDS.  Patient did not respond.  Patient seems confused and maybe disoriented based on some of his responses during the assessment.  I asked the patient if I could contact his sister-Daylin and he voiced that I could contact her.  Daylin was contacted and I asked her a few questions.  She states that the patient lives with someone but she believes they do little to help him at home.  She states that she went to  the patient for the 3/3/25 appointment and patient decided he wasn't going.  She is unsure if the patient is taking his medications correctly but she is sure that the patient does not always open the door for the home health company.  She believes the patient should be in a nursing home facility.  I told her that the patient would have to agree to any type of placement, Daylin voiced understanding.  I discussed possible placement with the patient.  When I asked the patient if he thought that he was capable of caring for himself at home and taking his medications.  He says no, that's why he was at the hospital.  I discussed possible placement options with patient.  He states he  would like to try a facility in [place].  Patient signed freedom of choice.

## 2025-03-18 NOTE — PLAN OF CARE
Problem: Adult Inpatient Plan of Care  Goal: Plan of Care Review  Outcome: Progressing  Goal: Patient-Specific Goal (Individualized)  Outcome: Progressing  Goal: Absence of Hospital-Acquired Illness or Injury  Outcome: Progressing  Goal: Optimal Comfort and Wellbeing  Outcome: Progressing  Goal: Readiness for Transition of Care  Outcome: Progressing     Problem: Fall Injury Risk  Goal: Absence of Fall and Fall-Related Injury  Outcome: Progressing     Problem: Comorbidity Management  Goal: Maintenance of Heart Failure Symptom Control  Outcome: Progressing  Goal: Blood Pressure in Desired Range  Outcome: Progressing     Problem: Fall Injury Risk  Goal: Absence of Fall and Fall-Related Injury  Outcome: Progressing     Problem: Comorbidity Management  Goal: Maintenance of Heart Failure Symptom Control  Outcome: Progressing  Goal: Blood Pressure in Desired Range  Outcome: Progressing

## 2025-03-18 NOTE — PLAN OF CARE
Problem: Adult Inpatient Plan of Care  Goal: Plan of Care Review  Outcome: Progressing  Goal: Patient-Specific Goal (Individualized)  Outcome: Progressing  Goal: Absence of Hospital-Acquired Illness or Injury  Outcome: Progressing  Goal: Optimal Comfort and Wellbeing  Outcome: Progressing  Goal: Readiness for Transition of Care  Outcome: Progressing     Problem: Fall Injury Risk  Goal: Absence of Fall and Fall-Related Injury  Outcome: Progressing     Problem: Comorbidity Management  Goal: Maintenance of Heart Failure Symptom Control  Outcome: Progressing  Goal: Blood Pressure in Desired Range  Outcome: Progressing
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
## 2025-03-18 NOTE — PLAN OF CARE
Discharge plan discussed with patient.  He is agreeable to placement for SNF at this time.  CCP list of SNF units discussed with patient.  Patient would like to try placement at at [facility].
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
## 2025-03-19 LAB
ANION GAP SERPL CALC-SCNC: 7 MEQ/L (ref 2–13)
BACTERIA UR CULT: NORMAL
BASOPHILS # BLD AUTO: 0.01 X10(3)/MCL (ref 0.01–0.08)
BASOPHILS NFR BLD AUTO: 0.2 % (ref 0.1–1.2)
BUN SERPL-MCNC: 35 MG/DL (ref 7–20)
CALCIUM SERPL-MCNC: 8.8 MG/DL (ref 8.4–10.2)
CHLORIDE SERPL-SCNC: 92 MMOL/L (ref 98–110)
CO2 SERPL-SCNC: 29 MMOL/L (ref 21–32)
CREAT SERPL-MCNC: 1.51 MG/DL (ref 0.66–1.25)
CREAT/UREA NIT SERPL: 23 (ref 12–20)
EOSINOPHIL # BLD AUTO: 0.05 X10(3)/MCL (ref 0.04–0.54)
EOSINOPHIL NFR BLD AUTO: 1 % (ref 0.7–7)
ERYTHROCYTE [DISTWIDTH] IN BLOOD BY AUTOMATED COUNT: 14.6 %
GFR SERPLBLD CREATININE-BSD FMLA CKD-EPI: 50 ML/MIN/1.73/M2
GLUCOSE SERPL-MCNC: 93 MG/DL (ref 70–115)
HCT VFR BLD AUTO: 42.9 % (ref 36–52)
HGB BLD-MCNC: 15 G/DL (ref 13–18)
IMM GRANULOCYTES # BLD AUTO: 0.01 X10(3)/MCL (ref 0–0.03)
IMM GRANULOCYTES NFR BLD AUTO: 0.2 % (ref 0–0.5)
LYMPHOCYTES # BLD AUTO: 1.73 X10(3)/MCL (ref 1.32–3.57)
LYMPHOCYTES NFR BLD AUTO: 35 % (ref 20–55)
MCH RBC QN AUTO: 31.1 PG (ref 27–34)
MCHC RBC AUTO-ENTMCNC: 35 G/DL (ref 31–37)
MCV RBC AUTO: 89 FL (ref 79–99)
MONOCYTES # BLD AUTO: 0.45 X10(3)/MCL (ref 0.3–0.82)
MONOCYTES NFR BLD AUTO: 9.1 % (ref 4.7–12.5)
NEUTROPHILS # BLD AUTO: 2.69 X10(3)/MCL (ref 1.78–5.38)
NEUTROPHILS NFR BLD AUTO: 54.5 % (ref 37–73)
NRBC BLD AUTO-RTO: 0 %
PLATELET # BLD AUTO: 237 X10(3)/MCL (ref 140–371)
PMV BLD AUTO: 10 FL (ref 9.4–12.4)
POTASSIUM SERPL-SCNC: 4 MMOL/L (ref 3.5–5.1)
RBC # BLD AUTO: 4.82 X10(6)/MCL (ref 4–6)
SODIUM SERPL-SCNC: 128 MMOL/L (ref 136–145)
WBC # BLD AUTO: 4.94 X10(3)/MCL (ref 4–11.5)

## 2025-03-19 PROCEDURE — 80048 BASIC METABOLIC PNL TOTAL CA: CPT | Performed by: FAMILY MEDICINE

## 2025-03-19 PROCEDURE — 97116 GAIT TRAINING THERAPY: CPT

## 2025-03-19 PROCEDURE — 85025 COMPLETE CBC W/AUTO DIFF WBC: CPT | Performed by: FAMILY MEDICINE

## 2025-03-19 PROCEDURE — 25000003 PHARM REV CODE 250: Performed by: INTERNAL MEDICINE

## 2025-03-19 PROCEDURE — 36415 COLL VENOUS BLD VENIPUNCTURE: CPT | Performed by: FAMILY MEDICINE

## 2025-03-19 PROCEDURE — 94761 N-INVAS EAR/PLS OXIMETRY MLT: CPT

## 2025-03-19 PROCEDURE — 25000003 PHARM REV CODE 250: Performed by: EMERGENCY MEDICINE

## 2025-03-19 PROCEDURE — 21400001 HC TELEMETRY ROOM

## 2025-03-19 PROCEDURE — 63600175 PHARM REV CODE 636 W HCPCS: Performed by: INTERNAL MEDICINE

## 2025-03-19 RX ADMIN — FAMOTIDINE 20 MG: 20 TABLET, FILM COATED ORAL at 08:03

## 2025-03-19 RX ADMIN — CARVEDILOL 3.12 MG: 3.12 TABLET, FILM COATED ORAL at 08:03

## 2025-03-19 RX ADMIN — Medication 6 MG: at 08:03

## 2025-03-19 RX ADMIN — ASPIRIN 81 MG: 81 TABLET ORAL at 08:03

## 2025-03-19 RX ADMIN — SACUBITRIL AND VALSARTAN 1 TABLET: 24; 26 TABLET, FILM COATED ORAL at 08:03

## 2025-03-19 RX ADMIN — ESCITALOPRAM OXALATE 10 MG: 10 TABLET ORAL at 08:03

## 2025-03-19 RX ADMIN — FUROSEMIDE 40 MG: 10 INJECTION, SOLUTION INTRAMUSCULAR; INTRAVENOUS at 09:03

## 2025-03-19 RX ADMIN — ATORVASTATIN CALCIUM 40 MG: 40 TABLET, FILM COATED ORAL at 08:03

## 2025-03-19 RX ADMIN — RIVAROXABAN 20 MG: 20 TABLET, FILM COATED ORAL at 05:03

## 2025-03-19 NOTE — PLAN OF CARE
Nursing Home SNF referrals made to [facility] , [facility] , [facility] , [facility] , [facility], [facility] and [facility].

## 2025-03-19 NOTE — PT/OT/SLP PROGRESS
"Physical Therapy Treatment    Patient Name:  William South   MRN:  [id number]    Recommendations:     Discharge Recommendations: Moderate Intensity Therapy  Discharge Equipment Recommendations: to be determined by next level of care  Barriers to discharge:  current medical status    Assessment:     William South is a 69 y.o. male admitted with a medical diagnosis of <principal problem not specified>.  He presents with the following impairments/functional limitations: impaired endurance, impaired functional mobility, impaired balance, impaired cardiopulmonary response to activity     Patient found with HOB elevated. Patient agreeable to PT tx but seemed a little down in the dumps this morning. Patient stated "I just feel like I'm not doing anything to make myself better or worse". Therapist educated patient on importance of exercise in cardiopulmonary conditions. Patient agreed to gait training. Supine to sit to stand completed, SBA. Gait training x110 feet in room with no AD, SBA/CGA. Patient then requested to sit down and returned to upright chair and left with all needs met and chair alarm on. Nurse notified.    Rehab Prognosis: Fair; patient would benefit from acute skilled PT services to address these deficits and reach maximum level of function.    Recent Surgery: * No surgery found *      Plan:     During this hospitalization, patient to be seen 5 x/week (5-6x weekly/1-2x daily) to address the identified rehab impairments via gait training, therapeutic activities, therapeutic exercises and progress toward the following goals:    Plan of Care Expires:  04/18/25    Subjective     Chief Complaint: "I'm just not impressed with myself"  Patient/Family Comments/goals: to get better  Pain/Comfort:         Objective:     Communicated with nursing prior to session.  Patient found HOB elevated with   upon PT entry to room.     General Precautions: Standard, fall  Orthopedic Precautions: N/A  Braces: N/A  Respiratory " Status: Room air     Functional Mobility:  Bed Mobility:     Supine to Sit: stand by assistance  Transfers:     Sit to Stand:  stand by assistance with no AD  Gait: 110 feet with no AD, SBA/CGA      AM-PAC 6 CLICK MOBILITY          Treatment & Education:  See above    Patient left up in chair with all lines intact, call button in reach, chair alarm on, and nurse notified..    GOALS:   Multidisciplinary Problems       Physical Therapy Goals          Problem: Physical Therapy    Goal Priority Disciplines Outcome Interventions   Physical Therapy Goal     PT, PT/OT Progressing    Description: Goals to be met by: discharge     Patient will increase functional independence with mobility by performin. Supine to sit with Modified [place]  2. Sit to stand transfer with Modified [place]  3. Gait  x 150 feet with Modified [place] using Rolling Walker/no AD.                          DME Justifications:  No DME recommended requiring DME justifications    Time Tracking:     PT Received On: 25  PT Start Time: 1045     PT Stop Time: 1055  PT Total Time (min): 10 min     Billable Minutes: Gait Training 10    Treatment Type: Treatment  PT/PTA: PT           2025

## 2025-03-19 NOTE — PROGRESS NOTES
Hospital Medicine  Progress Note    Patient Name: William South  MRN: [id number]  Status: IP- Inpatient   Admission Date: 3/17/2025  Length of Stay: 1  Date of Service: 03/19/2025       CC: hospital follow-up for        SUBJECTIVE   69-year-old male with past medical history significant combined congestive heart failure, hypertension, anxiety/depression, tobacco abuse and polysubstance abuse who presented to ED with complaints of shortness for breath leg and feet swelling patient noted increased shortness for breath especially with exertion but that has progressing to when he is at rest over the last few days. Patient stated he is having difficulty even was walking short distances with increasing shortness a breath. He noticed swelling to his bilateral feet migraine up his legs. Patient was recently seen at the end of February with the use prescribed Lasix Xarelto anticoagulation in initiated on call immediate therapy for congestive heart failure. At that time patient was also diagnosed with a pulmonary embolism he was discharged in his medication instructed to be compliant but patient stated he is not being compliant with medications he got confused with the administration. During initial evaluation ED he was found to have urine drug screen that was positive for cocaine. Denies any fever or chills nausea or vomiting.     3/19/2025  Sob much better today   Case mgmt working on snf    Today: Patient seen and examined at bedside, and chart reviewed.       MEDICATIONS   Scheduled   aspirin  81 mg Oral Daily    atorvastatin  40 mg Oral QHS    carvediloL  3.125 mg Oral BID    EScitalopram oxalate  10 mg Oral Daily    famotidine  20 mg Oral Daily    furosemide (LASIX) injection  40 mg Intravenous Daily    rivaroxaban  20 mg Oral Daily with dinner    sacubitriL-valsartan  1 tablet Oral BID     Continuous Infusions        PHYSICAL EXAM   VITALS: T 98.6 °F (37 °C)   /79   P 85   RR 18   O2 98 %    GENERAL: Awake and in  NAD  LUNGS: CTA B/L  CVS: Normal rate  GI/: Soft, NT, bowel sounds positive.  EXTREMITIES: No peripheral edema  NEURO: AAOx3  PSYCH: Cooperative      LABS   CBC  Recent Labs     03/18/25  0500 03/19/25  0503   WBC 5.40 4.94   RBC 5.02 4.82   HGB 15.5 15.0   HCT 44.2 42.9   MCV 88.0 89.0   MCH 30.9 31.1   MCHC 35.1 35.0   RDW 14.6 14.6    237     CHEM  Recent Labs     03/17/25  1112 03/18/25  0500 03/19/25  0503   * 129* 128*   K 4.4 4.1 4.0   CO2 28 26 29   BUN 27* 31* 35*   CREATININE 1.34* 1.32* 1.51*   GLUCOSE 99 90 93   CALCIUM 9.0 9.0 8.8   ALBUMIN 3.9  --   --    GLOBULIN 2.5  --   --    ALKPHOS 127  --   --    ALT 64*  --   --    AST 52  --   --    BILITOT 1.4*  --   --          MICROBIOLOGY     Microbiology Results (last 7 days)       Procedure Component Value Units Date/Time    Urine culture [[id number]] Collected: 03/17/25 1139    Order Status: Completed Specimen: Urine Updated: 03/19/25 0937     Urine Culture No Significant Growth              DIAGNOSTICS   CTA Chest Non-Coronary (PE Studies)  Addendum: There are typographical errors in #5 in the IMPRESSION.  This sentence  should read:     5.  Similar posterior dependent layering right pleural effusion with  marginating alveolar opacities.     Electronically signed by: Wiley Pepe MD   Date:    03/17/2025   Time:    14:33  Narrative: CT ANGIOGRAM OF THE CHEST WITH AND WITHOUT CONTRAST AND WITH 3D POSTPROCESSING:    CPT: 06059, 86613    Total DLP: 347.0 mGy-cm. Automatic exposure control was utilized to limit the radiation dose to the patient.  Total contrast: 96 mL in unspecified peripheral vein.    History:  Worsening shortness of breath with pulmonary thromboemboli on comparison exam from 02/26/2025.    Comparison exam: As above.    Technique: Multiple thin cut axial images were acquired a through the chest before and during intravenous contrast administration.  The source images were viewed at the workstation. MIP and volume  rendering was performed on the source images with 3-D reconstructions. The angiographic reconstructions were reviewed by the radiologist.    Findings:  This study is significantly limited due to poor contrast bolus timing with maximum opacification of the left innominate and subclavian veins, left innominate vein, SVC, and right atrium, which causes significant streak artifact.  There is poor mixing of contrast in the right greater left pulmonary arteries.  As on the prior exam, there are soft tissue density foci occluding segmental and subsegmental pulmonary arteries to the right lower lobe and questionably to the right middle and upper lobes and multiple peripheral subsegmental pulmonary arteries on the left due to the artifact from poor contrast bolus timing and other factors.  There is no evidence for right heart strain.  There is cardiomegaly.  The thoracic aorta is normal in caliber, but there is no contrast opacification to a be able to evaluate for possible dissection.  No obvious mediastinal or hilar adenopathy is identified, but evaluation for this is limited due to poor contrast bolus timing.  The thyroid gland is obscured in the thoracic inlet.  There is a similar sized posterior dependent right pleural effusion.  No left effusion or pneumothorax is identified.  There are emphysematous changes in both lungs more prominent in the apices.  There are patchy alveolar opacities in the right greater left lung bases.  Impression: 1. This study is significantly limited due to poor contrast bolus timing, as above, and there is poor mixing of contrast in the right greater left pulmonary arteries.  2. As on the prior exam, there are occlusive pulmonary thromboemboli in segmental and subsegmental pulmonary arteries to the right lower lobe and questionably to the right middle and upper lobes and multiple peripheral left subsegmental pulmonary arteries  versus artifact.  There is no evidence for right heart  strain.  3. On the prior exam, there was nonocclusive thrombus in the distal right main pulmonary artery.  On today's exam, this appears more as poor mixing of contrast without definite thrombus.  4. Cardiomegaly.  5. Similar posterior dependent layering right pleural effusion with marginating there are opacities.    Electronically signed by: Wiley Pepe MD  Date:    03/17/2025  Time:    12:47  X-Ray Chest AP Portable  Narrative: EXAMINATION:  XR CHEST AP PORTABLE    CLINICAL HISTORY:  Chest pain and shortness of breath with history of pulmonary thromboemboli.    TECHNIQUE:  Single frontal view of the chest was performed.    COMPARISON:  02/26/2025.    FINDINGS:  There is a similar right posterior dependent lying pleural effusion with some extension in the right lung base and costophrenic angle and fissures.  Alveolar opacities marginate the effusion.  There is interval decrease in previously seen alveolar opacities in the left lung base.  No left effusion or pneumothorax is present.  There is similar cardiomegaly and prominent main pulmonary vessels.  Impression: 1. Similar right pleural effusion with marginating alveolar opacities.  2. Alveolar opacities in left lung base have decreased.  3. Similar cardiomegaly and prominent central pulmonary vessels.    Electronically signed by: Wiley Pepe MD  Date:    03/17/2025  Time:    14:31      Assessment/Plan:      There are no hospital problems to display for this patient.     VTE Risk Mitigation (From admission, onward)              Ordered       rivaroxaban tablet 20 mg  With dinner         03/17/25 1716                          Combined CHF exacerbation:  Acute on chronic  With associated dyspnea, chest tightness, and BLE edema  Pro-BNP 18,300  CTA chest shows occlusive pulmonary thromboembolism segmental subsegmental pulmonary arteries to the right lower lobe questions to right middle and upper lobes and multiple peripheral left subsegmental pulmonary arteries  versus on vanc.  Seen as on prior exam.  Cardiomegaly.  Posterior dependent layering right pleural effusion.  Diuretic therapy furosemide 40 mg IV x1 given in ED  Patient with a history of noncompliance   He was discharged on Lasix, Entresto, Xarelto from last admission but patient stated he has not been taking his medications as he has been confused on how to administer  Urine drug screen was positive for cocaine  Daily weights, I/Os  Resumed Entresto, Xarelto  Cont iv lasix      Pulmonary embolism:   Diagnosed on last admission   Discharged on Xarelto patient has been taking   Resume Xarelto this time of admission  Importance of medication adherence was conveyed to patient     HTN:  Resume home meds once reconciled  Give IV antihypertensive for prn use with parameters        Code: FULL   PPX: BSOLEGs            Yossi Hernández MD  [facility] Medicine

## 2025-03-19 NOTE — PLAN OF CARE
03/19/25 1419   Discharge Reassessment   Assessment Type Discharge Planning Reassessment   Did the patient's condition or plan change since previous assessment? No   Discharge Plan discussed with: Patient;Adult children;Sibling   Name(s) and Number(s) Corie [phone] and Mimi Lee [phone]   Communicated CHASIDY with patient/caregiver Yes   Discharge Plan A Skilled Nursing Facility   Discharge Plan B Hospice/home   DME Needed Upon Discharge  none   Transition of Care Barriers No family/friends to help;Mobility;Does not adhere to care plan;Nursing Home rejection;Social;Substance Abuse;Transportation   Why the patient remains in the hospital Requires continued medical care;Placement issues   Post-Acute Status   Post-Acute Authorization Placement   Post-Acute Placement Status Pending state direction/certification   Coverage [facility] Dual Complete   Patient choice form signed by patient/caregiver List with quality metrics by geographic area provided;List from CMS Compare;List from System Post-Acute Care   Discharge Delays (!) Post-Acute Set-up

## 2025-03-19 NOTE — PLAN OF CARE
Discussed discharge plan with patient's daughter-Mimi Lee.  We discussed hospice care at home vs. SNF placement.  She would like SNF placement for the patient if possible.

## 2025-03-20 LAB
ANION GAP SERPL CALC-SCNC: 8 MEQ/L (ref 2–13)
BASOPHILS # BLD AUTO: 0.03 X10(3)/MCL (ref 0.01–0.08)
BASOPHILS NFR BLD AUTO: 0.6 % (ref 0.1–1.2)
BUN SERPL-MCNC: 33 MG/DL (ref 7–20)
CALCIUM SERPL-MCNC: 8.6 MG/DL (ref 8.4–10.2)
CHLORIDE SERPL-SCNC: 93 MMOL/L (ref 98–110)
CO2 SERPL-SCNC: 25 MMOL/L (ref 21–32)
CREAT SERPL-MCNC: 1.27 MG/DL (ref 0.66–1.25)
CREAT/UREA NIT SERPL: 26 (ref 12–20)
EOSINOPHIL # BLD AUTO: 0.07 X10(3)/MCL (ref 0.04–0.54)
EOSINOPHIL NFR BLD AUTO: 1.3 % (ref 0.7–7)
ERYTHROCYTE [DISTWIDTH] IN BLOOD BY AUTOMATED COUNT: 14.6 %
GFR SERPLBLD CREATININE-BSD FMLA CKD-EPI: 61 ML/MIN/1.73/M2
GLUCOSE SERPL-MCNC: 76 MG/DL (ref 70–115)
HCT VFR BLD AUTO: 43.1 % (ref 36–52)
HGB BLD-MCNC: 15.1 G/DL (ref 13–18)
IMM GRANULOCYTES # BLD AUTO: 0.01 X10(3)/MCL (ref 0–0.03)
IMM GRANULOCYTES NFR BLD AUTO: 0.2 % (ref 0–0.5)
LYMPHOCYTES # BLD AUTO: 1.85 X10(3)/MCL (ref 1.32–3.57)
LYMPHOCYTES NFR BLD AUTO: 34.8 % (ref 20–55)
MCH RBC QN AUTO: 30.8 PG (ref 27–34)
MCHC RBC AUTO-ENTMCNC: 35 G/DL (ref 31–37)
MCV RBC AUTO: 88 FL (ref 79–99)
MONOCYTES # BLD AUTO: 0.57 X10(3)/MCL (ref 0.3–0.82)
MONOCYTES NFR BLD AUTO: 10.7 % (ref 4.7–12.5)
NEUTROPHILS # BLD AUTO: 2.79 X10(3)/MCL (ref 1.78–5.38)
NEUTROPHILS NFR BLD AUTO: 52.4 % (ref 37–73)
NRBC BLD AUTO-RTO: 0 %
PLATELET # BLD AUTO: 200 X10(3)/MCL (ref 140–371)
PMV BLD AUTO: 10 FL (ref 9.4–12.4)
POTASSIUM SERPL-SCNC: 3.8 MMOL/L (ref 3.5–5.1)
RBC # BLD AUTO: 4.9 X10(6)/MCL (ref 4–6)
SODIUM SERPL-SCNC: 126 MMOL/L (ref 136–145)
WBC # BLD AUTO: 5.32 X10(3)/MCL (ref 4–11.5)

## 2025-03-20 PROCEDURE — 85025 COMPLETE CBC W/AUTO DIFF WBC: CPT | Performed by: FAMILY MEDICINE

## 2025-03-20 PROCEDURE — 94761 N-INVAS EAR/PLS OXIMETRY MLT: CPT

## 2025-03-20 PROCEDURE — 97116 GAIT TRAINING THERAPY: CPT

## 2025-03-20 PROCEDURE — 63600175 PHARM REV CODE 636 W HCPCS: Performed by: INTERNAL MEDICINE

## 2025-03-20 PROCEDURE — 80048 BASIC METABOLIC PNL TOTAL CA: CPT | Performed by: FAMILY MEDICINE

## 2025-03-20 PROCEDURE — 36415 COLL VENOUS BLD VENIPUNCTURE: CPT | Performed by: FAMILY MEDICINE

## 2025-03-20 PROCEDURE — 25000003 PHARM REV CODE 250: Performed by: EMERGENCY MEDICINE

## 2025-03-20 PROCEDURE — 25000003 PHARM REV CODE 250: Performed by: INTERNAL MEDICINE

## 2025-03-20 PROCEDURE — 21400001 HC TELEMETRY ROOM

## 2025-03-20 RX ADMIN — CARVEDILOL 3.12 MG: 3.12 TABLET, FILM COATED ORAL at 08:03

## 2025-03-20 RX ADMIN — ESCITALOPRAM OXALATE 10 MG: 10 TABLET ORAL at 08:03

## 2025-03-20 RX ADMIN — RIVAROXABAN 20 MG: 20 TABLET, FILM COATED ORAL at 05:03

## 2025-03-20 RX ADMIN — FAMOTIDINE 20 MG: 20 TABLET, FILM COATED ORAL at 08:03

## 2025-03-20 RX ADMIN — SACUBITRIL AND VALSARTAN 1 TABLET: 24; 26 TABLET, FILM COATED ORAL at 08:03

## 2025-03-20 RX ADMIN — ATORVASTATIN CALCIUM 40 MG: 40 TABLET, FILM COATED ORAL at 08:03

## 2025-03-20 RX ADMIN — ASPIRIN 81 MG: 81 TABLET ORAL at 08:03

## 2025-03-20 RX ADMIN — Medication 6 MG: at 08:03

## 2025-03-20 RX ADMIN — FUROSEMIDE 40 MG: 10 INJECTION, SOLUTION INTRAMUSCULAR; INTRAVENOUS at 09:03

## 2025-03-20 NOTE — PLAN OF CARE
NH's contacted to check status of acceptance or denial (1) [facility] - I'm awaiting call back (2)[facility] is interested but pt's fly is not- on hold (3) [facility]- message left (4) [facility]- left message (5) [facility]-does not accept pt's insurance (6) [facility] is out of network but will try to get an out of network contract agreement.

## 2025-03-20 NOTE — PT/OT/SLP PROGRESS
Physical Therapy Treatment    Patient Name:  William South   MRN:  [id number]    Recommendations:     Discharge Recommendations: Moderate Intensity Therapy  Discharge Equipment Recommendations: to be determined by next level of care  Barriers to discharge:  current medical status    Assessment:     William South is a 69 y.o. male admitted with a medical diagnosis of <principal problem not specified>.  He presents with the following impairments/functional limitations: impaired endurance, impaired functional mobility, impaired balance, impaired cardiopulmonary response to activity     Patient found with HOB elevated. Patient agreeable to PT tx. Supine to sit to stand completed, SBA. Sit to stand completed SBA with no AD. Gait training x110 feet in hallway with JENNIFER KUHN. Patient then requested to sit down and returned to upright chair and left with all needs met and chair alarm on. Nurse notified.    Rehab Prognosis: Fair; patient would benefit from acute skilled PT services to address these deficits and reach maximum level of function.    Recent Surgery: * No surgery found *      Plan:     During this hospitalization, patient to be seen 5 x/week (5-6x weekly/1-2x daily) to address the identified rehab impairments via gait training, therapeutic activities, therapeutic exercises and progress toward the following goals:    Plan of Care Expires:  04/18/25    Subjective     Chief Complaint: none verbalized  Patient/Family Comments/goals: to get better  Pain/Comfort:         Objective:     Communicated with nursing prior to session.  Patient found HOB elevated with   upon PT entry to room.     General Precautions: Standard, fall  Orthopedic Precautions: N/A  Braces: N/A  Respiratory Status: Room air     Functional Mobility:  Bed Mobility:     Supine to Sit: stand by assistance  Transfers:     Sit to Stand:  stand by assistance with no AD  Gait: 110 feet with RW, SBA      AM-PAC 6 CLICK MOBILITY          Treatment &  Education:  See above    Patient left up in chair with all lines intact, call button in reach, chair alarm on, and nurse notified..    GOALS:   Multidisciplinary Problems       Physical Therapy Goals          Problem: Physical Therapy    Goal Priority Disciplines Outcome Interventions   Physical Therapy Goal     PT, PT/OT Progressing    Description: Goals to be met by: discharge     Patient will increase functional independence with mobility by performin. Supine to sit with Modified [place]  2. Sit to stand transfer with Modified [place]  3. Gait  x 150 feet with Modified [place] using Rolling Walker/no AD.                          DME Justifications:  No DME recommended requiring DME justifications    Time Tracking:     PT Received On: 25  PT Start Time: 1247     PT Stop Time: 1259  PT Total Time (min): 12 min     Billable Minutes: Gait Training 12    Treatment Type: Treatment  PT/PTA: PT           2025

## 2025-03-20 NOTE — PROGRESS NOTES
Hospital Medicine  Progress Note    Patient Name: William South  MRN: [id number]  Status: IP- Inpatient   Admission Date: 3/17/2025  Length of Stay: 2  Date of Service: 03/20/2025       CC: hospital follow-up for        SUBJECTIVE   69-year-old male with past medical history significant combined congestive heart failure, hypertension, anxiety/depression, tobacco abuse and polysubstance abuse who presented to ED with complaints of shortness for breath leg and feet swelling patient noted increased shortness for breath especially with exertion but that has progressing to when he is at rest over the last few days. Patient stated he is having difficulty even was walking short distances with increasing shortness a breath. He noticed swelling to his bilateral feet migraine up his legs. Patient was recently seen at the end of February with the use prescribed Lasix Xarelto anticoagulation in initiated on call immediate therapy for congestive heart failure. At that time patient was also diagnosed with a pulmonary embolism he was discharged in his medication instructed to be compliant but patient stated he is not being compliant with medications he got confused with the administration. During initial evaluation ED he was found to have urine drug screen that was positive for cocaine. Denies any fever or chills nausea or vomiting.      3/19/2025  Sob much better today   Case mgmt working on snf    3/20/2025  No new c/o  Work with PT  Sob better    Today: Patient seen and examined at bedside, and chart reviewed.       MEDICATIONS   Scheduled   aspirin  81 mg Oral Daily    atorvastatin  40 mg Oral QHS    carvediloL  3.125 mg Oral BID    EScitalopram oxalate  10 mg Oral Daily    famotidine  20 mg Oral Daily    rivaroxaban  20 mg Oral Daily with dinner    sacubitriL-valsartan  1 tablet Oral BID     Continuous Infusions        PHYSICAL EXAM   VITALS: T 96.9 °F (36.1 °C)   /76   P 82   RR 18   O2 (!) 92 %    GENERAL: Awake and in  NAD  LUNGS: CTA B/L  CVS: Normal rate  GI/: Soft, NT, bowel sounds positive.  EXTREMITIES: No peripheral edema  NEURO: AAOx3  PSYCH: Cooperative      LABS   CBC  Recent Labs     03/19/25  0503 03/20/25  0455   WBC 4.94 5.32   RBC 4.82 4.90   HGB 15.0 15.1   HCT 42.9 43.1   MCV 89.0 88.0   MCH 31.1 30.8   MCHC 35.0 35.0   RDW 14.6 14.6    200     CHEM  Recent Labs     03/19/25  0503 03/20/25  0455   * 126*   K 4.0 3.8   CO2 29 25   BUN 35* 33*   CREATININE 1.51* 1.27*   GLUCOSE 93 76   CALCIUM 8.8 8.6         MICROBIOLOGY     Microbiology Results (last 7 days)       Procedure Component Value Units Date/Time    Urine culture [[id number]] Collected: 03/17/25 1139    Order Status: Completed Specimen: Urine Updated: 03/19/25 0937     Urine Culture No Significant Growth              DIAGNOSTICS   CTA Chest Non-Coronary (PE Studies)  Addendum: There are typographical errors in #5 in the IMPRESSION.  This sentence  should read:     5.  Similar posterior dependent layering right pleural effusion with  marginating alveolar opacities.     Electronically signed by: Wiley Pepe MD   Date:    03/17/2025   Time:    14:33  Narrative: CT ANGIOGRAM OF THE CHEST WITH AND WITHOUT CONTRAST AND WITH 3D POSTPROCESSING:    CPT: 38950, 92638    Total DLP: 347.0 mGy-cm. Automatic exposure control was utilized to limit the radiation dose to the patient.  Total contrast: 96 mL in unspecified peripheral vein.    History:  Worsening shortness of breath with pulmonary thromboemboli on comparison exam from 02/26/2025.    Comparison exam: As above.    Technique: Multiple thin cut axial images were acquired a through the chest before and during intravenous contrast administration.  The source images were viewed at the workstation. MIP and volume rendering was performed on the source images with 3-D reconstructions. The angiographic reconstructions were reviewed by the radiologist.    Findings:  This study is significantly limited  due to poor contrast bolus timing with maximum opacification of the left innominate and subclavian veins, left innominate vein, SVC, and right atrium, which causes significant streak artifact.  There is poor mixing of contrast in the right greater left pulmonary arteries.  As on the prior exam, there are soft tissue density foci occluding segmental and subsegmental pulmonary arteries to the right lower lobe and questionably to the right middle and upper lobes and multiple peripheral subsegmental pulmonary arteries on the left due to the artifact from poor contrast bolus timing and other factors.  There is no evidence for right heart strain.  There is cardiomegaly.  The thoracic aorta is normal in caliber, but there is no contrast opacification to a be able to evaluate for possible dissection.  No obvious mediastinal or hilar adenopathy is identified, but evaluation for this is limited due to poor contrast bolus timing.  The thyroid gland is obscured in the thoracic inlet.  There is a similar sized posterior dependent right pleural effusion.  No left effusion or pneumothorax is identified.  There are emphysematous changes in both lungs more prominent in the apices.  There are patchy alveolar opacities in the right greater left lung bases.  Impression: 1. This study is significantly limited due to poor contrast bolus timing, as above, and there is poor mixing of contrast in the right greater left pulmonary arteries.  2. As on the prior exam, there are occlusive pulmonary thromboemboli in segmental and subsegmental pulmonary arteries to the right lower lobe and questionably to the right middle and upper lobes and multiple peripheral left subsegmental pulmonary arteries  versus artifact.  There is no evidence for right heart strain.  3. On the prior exam, there was nonocclusive thrombus in the distal right main pulmonary artery.  On today's exam, this appears more as poor mixing of contrast without definite  thrombus.  4. Cardiomegaly.  5. Similar posterior dependent layering right pleural effusion with marginating there are opacities.    Electronically signed by: Wiley Pepe MD  Date:    03/17/2025  Time:    12:47  X-Ray Chest AP Portable  Narrative: EXAMINATION:  XR CHEST AP PORTABLE    CLINICAL HISTORY:  Chest pain and shortness of breath with history of pulmonary thromboemboli.    TECHNIQUE:  Single frontal view of the chest was performed.    COMPARISON:  02/26/2025.    FINDINGS:  There is a similar right posterior dependent lying pleural effusion with some extension in the right lung base and costophrenic angle and fissures.  Alveolar opacities marginate the effusion.  There is interval decrease in previously seen alveolar opacities in the left lung base.  No left effusion or pneumothorax is present.  There is similar cardiomegaly and prominent main pulmonary vessels.  Impression: 1. Similar right pleural effusion with marginating alveolar opacities.  2. Alveolar opacities in left lung base have decreased.  3. Similar cardiomegaly and prominent central pulmonary vessels.    Electronically signed by: Wiley Pepe MD  Date:    03/17/2025  Time:    14:31    Assessment/Plan:      There are no hospital problems to display for this patient.     VTE Risk Mitigation (From admission, onward)              Ordered       rivaroxaban tablet 20 mg  With dinner         03/17/25 1716                          Combined CHF exacerbation:  Acute on chronic  With associated dyspnea, chest tightness, and BLE edema  Pro-BNP 18,300  CTA chest shows occlusive pulmonary thromboembolism segmental subsegmental pulmonary arteries to the right lower lobe questions to right middle and upper lobes and multiple peripheral left subsegmental pulmonary arteries versus on vanc.  Seen as on prior exam.  Cardiomegaly.  Posterior dependent layering right pleural effusion.  Diuretic therapy furosemide 40 mg IV x1 given in ED  Patient with a history  of noncompliance   He was discharged on Lasix, Entresto, Xarelto from last admission but patient stated he has not been taking his medications as he has been confused on how to administer  Urine drug screen was positive for cocaine  Daily weights, I/Os  Resumed Entresto, Xarelto  Cont iv lasix      Pulmonary embolism:   Diagnosed on last admission   Discharged on Xarelto patient has been taking   Resume Xarelto this time of admission  Importance of medication adherence was conveyed to patient     HTN:  Resume home meds once reconciled  Give IV antihypertensive for prn use with parameters        Code: FULL   PPX: BSCDs              Yossi Hernández MD  [facility]

## 2025-03-20 NOTE — PLAN OF CARE
[facility] is not in network with pt's insurance. [facility] declined patient .

## 2025-03-21 LAB
ANION GAP SERPL CALC-SCNC: 3 MEQ/L (ref 2–13)
BASOPHILS # BLD AUTO: 0.03 X10(3)/MCL (ref 0.01–0.08)
BASOPHILS NFR BLD AUTO: 0.6 % (ref 0.1–1.2)
BUN SERPL-MCNC: 30 MG/DL (ref 7–20)
CALCIUM SERPL-MCNC: 8.1 MG/DL (ref 8.4–10.2)
CHLORIDE SERPL-SCNC: 94 MMOL/L (ref 98–110)
CO2 SERPL-SCNC: 31 MMOL/L (ref 21–32)
CREAT SERPL-MCNC: 1.13 MG/DL (ref 0.66–1.25)
CREAT/UREA NIT SERPL: 27 (ref 12–20)
EOSINOPHIL # BLD AUTO: 0.06 X10(3)/MCL (ref 0.04–0.54)
EOSINOPHIL NFR BLD AUTO: 1.1 % (ref 0.7–7)
ERYTHROCYTE [DISTWIDTH] IN BLOOD BY AUTOMATED COUNT: 14.6 %
GFR SERPLBLD CREATININE-BSD FMLA CKD-EPI: 70 ML/MIN/1.73/M2
GLUCOSE SERPL-MCNC: 71 MG/DL (ref 70–115)
HCT VFR BLD AUTO: 43.9 % (ref 36–52)
HGB BLD-MCNC: 15.6 G/DL (ref 13–18)
IMM GRANULOCYTES # BLD AUTO: 0.01 X10(3)/MCL (ref 0–0.03)
IMM GRANULOCYTES NFR BLD AUTO: 0.2 % (ref 0–0.5)
LYMPHOCYTES # BLD AUTO: 1.66 X10(3)/MCL (ref 1.32–3.57)
LYMPHOCYTES NFR BLD AUTO: 30.7 % (ref 20–55)
MCH RBC QN AUTO: 31.1 PG (ref 27–34)
MCHC RBC AUTO-ENTMCNC: 35.5 G/DL (ref 31–37)
MCV RBC AUTO: 87.6 FL (ref 79–99)
MONOCYTES # BLD AUTO: 0.53 X10(3)/MCL (ref 0.3–0.82)
MONOCYTES NFR BLD AUTO: 9.8 % (ref 4.7–12.5)
NEUTROPHILS # BLD AUTO: 3.12 X10(3)/MCL (ref 1.78–5.38)
NEUTROPHILS NFR BLD AUTO: 57.6 % (ref 37–73)
NRBC BLD AUTO-RTO: 0 %
PLATELET # BLD AUTO: 200 X10(3)/MCL (ref 140–371)
PMV BLD AUTO: 10.1 FL (ref 9.4–12.4)
POTASSIUM SERPL-SCNC: 3.5 MMOL/L (ref 3.5–5.1)
RBC # BLD AUTO: 5.01 X10(6)/MCL (ref 4–6)
SODIUM SERPL-SCNC: 128 MMOL/L (ref 136–145)
WBC # BLD AUTO: 5.41 X10(3)/MCL (ref 4–11.5)

## 2025-03-21 PROCEDURE — 25000003 PHARM REV CODE 250: Performed by: INTERNAL MEDICINE

## 2025-03-21 PROCEDURE — 94761 N-INVAS EAR/PLS OXIMETRY MLT: CPT

## 2025-03-21 PROCEDURE — 97116 GAIT TRAINING THERAPY: CPT

## 2025-03-21 PROCEDURE — 36415 COLL VENOUS BLD VENIPUNCTURE: CPT | Performed by: FAMILY MEDICINE

## 2025-03-21 PROCEDURE — 80048 BASIC METABOLIC PNL TOTAL CA: CPT | Performed by: FAMILY MEDICINE

## 2025-03-21 PROCEDURE — 85025 COMPLETE CBC W/AUTO DIFF WBC: CPT | Performed by: FAMILY MEDICINE

## 2025-03-21 PROCEDURE — 25000003 PHARM REV CODE 250: Performed by: EMERGENCY MEDICINE

## 2025-03-21 PROCEDURE — 21400001 HC TELEMETRY ROOM

## 2025-03-21 RX ADMIN — CARVEDILOL 3.12 MG: 3.12 TABLET, FILM COATED ORAL at 08:03

## 2025-03-21 RX ADMIN — ATORVASTATIN CALCIUM 40 MG: 40 TABLET, FILM COATED ORAL at 08:03

## 2025-03-21 RX ADMIN — FAMOTIDINE 20 MG: 20 TABLET, FILM COATED ORAL at 09:03

## 2025-03-21 RX ADMIN — RIVAROXABAN 20 MG: 20 TABLET, FILM COATED ORAL at 05:03

## 2025-03-21 RX ADMIN — SACUBITRIL AND VALSARTAN 1 TABLET: 24; 26 TABLET, FILM COATED ORAL at 09:03

## 2025-03-21 RX ADMIN — ASPIRIN 81 MG: 81 TABLET ORAL at 09:03

## 2025-03-21 RX ADMIN — ESCITALOPRAM OXALATE 10 MG: 10 TABLET ORAL at 09:03

## 2025-03-21 RX ADMIN — Medication 6 MG: at 08:03

## 2025-03-21 RX ADMIN — CARVEDILOL 3.12 MG: 3.12 TABLET, FILM COATED ORAL at 09:03

## 2025-03-21 RX ADMIN — SACUBITRIL AND VALSARTAN 1 TABLET: 24; 26 TABLET, FILM COATED ORAL at 08:03

## 2025-03-21 NOTE — PLAN OF CARE
03/21/25 1040   Discharge Reassessment   Assessment Type Discharge Planning Reassessment   Did the patient's condition or plan change since previous assessment? No   Discharge Plan discussed with: Patient;Adult children   Name(s) and Number(s) Mimi Lee-Daughter [phone]   Communicated CHASIDY with patient/caregiver Yes   Discharge Plan A Skilled Nursing Facility   Discharge Plan B Hospice/home   DME Needed Upon Discharge  none   Transition of Care Barriers No family/friends to help;Mobility;Substance Abuse;Does not adhere to care plan;Nursing Home rejection   Why the patient remains in the hospital Placement issues   Post-Acute Status   Post-Acute Authorization Placement   Post-Acute Placement Status Pending payor review/awaiting authorization (if required)   Coverage [facility] Managed [facility] Dual Complete   Patient choice form signed by patient/caregiver List with quality metrics by geographic area provided;List from CMS Compare;List from System Post-Acute Care   Discharge Delays (!) Post-Acute Set-up

## 2025-03-21 NOTE — SUBJECTIVE & OBJECTIVE
Interval History:     Review of Systems   Constitutional:  Positive for appetite change and fatigue. Negative for activity change and fever.   HENT:  Negative for congestion, ear pain, nosebleeds, sinus pressure, sore throat, tinnitus and trouble swallowing.    Eyes:  Negative for pain and itching.   Respiratory:  Positive for shortness of breath. Negative for apnea, cough, choking, chest tightness and wheezing.    Cardiovascular:  Negative for chest pain and leg swelling.   Gastrointestinal:  Negative for abdominal distention, abdominal pain, constipation, diarrhea, nausea and vomiting.   Endocrine: Negative for cold intolerance and heat intolerance.   Genitourinary:  Negative for dysuria, enuresis, frequency, penile swelling, scrotal swelling, testicular pain and urgency.   Musculoskeletal:  Negative for arthralgias, back pain, gait problem, joint swelling, myalgias and neck pain.   Skin:  Negative for color change, pallor, rash and wound.   Allergic/Immunologic: Negative for environmental allergies and food allergies.   Neurological:  Negative for dizziness, tremors, seizures, syncope, numbness and headaches.   Psychiatric/Behavioral:  Negative for agitation, behavioral problems, dysphoric mood, hallucinations, self-injury and suicidal ideas. The patient is not nervous/anxious and is not hyperactive.      Objective:     Vital Signs (Most Recent):  Temp: 97.7 °F (36.5 °C) (03/21/25 0800)  Pulse: 85 (03/21/25 0806)  Resp: 18 (03/21/25 0806)  BP: (!) 136/96 (03/21/25 0800)  SpO2: 96 % (03/21/25 0806) Vital Signs (24h Range):  Temp:  [96.6 °F (35.9 °C)-97.7 °F (36.5 °C)] 97.7 °F (36.5 °C)  Pulse:  [82-90] 85  Resp:  [18-28] 18  SpO2:  [91 %-96 %] 96 %  BP: (102-136)/(75-96) 136/96     Weight: 65.9 kg (145 lb 3.2 oz)  Body mass index is 22.74 kg/m².    Intake/Output Summary (Last 24 hours) at 3/21/2025 0844  Last data filed at 3/21/2025 0823  Gross per 24 hour   Intake 1980 ml   Output 650 ml   Net 1330 ml          Physical Exam  Constitutional:       General: He is not in acute distress.     Appearance: Normal appearance. He is ill-appearing. He is not toxic-appearing or diaphoretic.   HENT:      Head: Normocephalic and atraumatic.      Right Ear: External ear normal.      Left Ear: External ear normal.      Nose: Nose normal. No congestion or rhinorrhea.      Mouth/Throat:      Mouth: Mucous membranes are moist.      Pharynx: Oropharynx is clear.   Eyes:      Extraocular Movements: Extraocular movements intact.      Conjunctiva/sclera: Conjunctivae normal.      Pupils: Pupils are equal, round, and reactive to light.   Neck:      Vascular: No carotid bruit.   Cardiovascular:      Rate and Rhythm: Normal rate and regular rhythm.      Pulses: Normal pulses.      Heart sounds: Normal heart sounds. No murmur heard.  Pulmonary:      Effort: Pulmonary effort is normal. No respiratory distress.      Breath sounds: Rales present. No wheezing or rhonchi.   Abdominal:      General: Abdomen is flat. Bowel sounds are normal. There is no distension.      Palpations: Abdomen is soft.      Tenderness: There is no abdominal tenderness. There is no guarding.   Musculoskeletal:         General: No swelling or tenderness. Normal range of motion.      Cervical back: Normal range of motion and neck supple. No tenderness.      Right lower leg: No edema.      Left lower leg: No edema.   Lymphadenopathy:      Cervical: No cervical adenopathy.   Skin:     General: Skin is warm and dry.      Coloration: Skin is not jaundiced or pale.   Neurological:      General: No focal deficit present.      Mental Status: He is alert and oriented to person, place, and time. Mental status is at baseline.      Cranial Nerves: No cranial nerve deficit.      Motor: No weakness.      Coordination: Coordination normal.      Gait: Gait normal.   Psychiatric:         Mood and Affect: Mood normal.         Behavior: Behavior normal.         Thought Content: Thought content  normal.         Judgment: Judgment normal.               Significant Labs: All pertinent labs within the past 24 hours have been reviewed.    Significant Imaging: I have reviewed all pertinent imaging results/findings within the past 24 hours.

## 2025-03-21 NOTE — PT/OT/SLP PROGRESS
Physical Therapy Treatment    Patient Name:  William South   MRN:  [id number]    Recommendations:     Discharge Recommendations: Moderate Intensity Therapy  Discharge Equipment Recommendations: to be determined by next level of care  Barriers to discharge:  current medical status    Assessment:     William South is a 69 y.o. male admitted with a medical diagnosis of Severe systolic congestive heart failure.  He presents with the following impairments/functional limitations: impaired endurance, impaired functional mobility, impaired balance, impaired cardiopulmonary response to activity     Patient found up in chair. Patient agreeable to PT tx. Patient performed sit to stand with SBA with no AD. Gait training x 120 feet in hallway with JENNIFER KUHN. Patient then requested to sit back down and returned to upright chair and left with all needs met and chair alarm on. Nurse notified.    Rehab Prognosis: Fair; patient would benefit from acute skilled PT services to address these deficits and reach maximum level of function.    Recent Surgery: * No surgery found *      Plan:     During this hospitalization, patient to be seen 5 x/week (5-6x weekly/1-2x daily) to address the identified rehab impairments via gait training, therapeutic activities, therapeutic exercises and progress toward the following goals:    Plan of Care Expires:  04/18/25    Subjective     Chief Complaint: none verbalized today  Patient/Family Comments/goals: to get better  Pain/Comfort:         Objective:     Communicated with nursing prior to session.  Patient found HOB elevated with peripheral IV, bed alarm upon PT entry to room.     General Precautions: Standard, fall  Orthopedic Precautions: N/A  Braces: N/A  Respiratory Status: Room air     Functional Mobility:  Bed Mobility:     Supine to Sit: stand by assistance  Transfers:     Sit to Stand:  stand by assistance with no AD  Gait: 120 feet with RW, SBA      AM-PAC 6 CLICK MOBILITY          Treatment &  Education:  See above    Patient left up in chair with all lines intact, call button in reach, chair alarm on, and nurse notified..    GOALS:   Multidisciplinary Problems       Physical Therapy Goals          Problem: Physical Therapy    Goal Priority Disciplines Outcome Interventions   Physical Therapy Goal     PT, PT/OT Progressing    Description: Goals to be met by: discharge     Patient will increase functional independence with mobility by performin. Supine to sit with Modified [place]  2. Sit to stand transfer with Modified [place]  3. Gait  x 150 feet with Modified [place] using Rolling Walker/no AD.                          DME Justifications:  No DME recommended requiring DME justifications    Time Tracking:     PT Received On: 25  PT Start Time: 1330     PT Stop Time: 1340  PT Total Time (min): 10 min     Billable Minutes: Gait Training 10    Treatment Type: Treatment  PT/PTA: PT           2025

## 2025-03-21 NOTE — PLAN OF CARE
The person over new patient admissions is out today. The  is checking to see if they accept pt's insurance. Chart refaxed.

## 2025-03-21 NOTE — PLAN OF CARE
Rec'd call from Irene @ [facility] stating they are interested in patient and to send more clinicals on patient.

## 2025-03-21 NOTE — HOSPITAL COURSE
3/19/2025  Sob much better today   Case mgmt working on snf     3/20/2025  No new c/o  Work with PT  Sob better     Today: Patient seen and examined at bedside, and chart reviewed.   03/21/2025 patient resting comfortably awaiting placement.  He denies any shortness of breath currently.  We will continue his current medications labs look good we will repeat some in the morning.  03/22/2025 patient doing well waiting for placement.  He complains of a little bit of acid indigestion requests some Tums.  He states his bowels are moving normally.  Labs look good repeat some labs tomorrow and prescribe him some Tums.  03/24/2025 pt awake and alert confused, agreed to [facility] halfway does not seem to be able to live independentlky, does not take his medicine and does not go to his appoitments.  03/25/2025 awiating insurance and state approval for placement  03/26/2025 no new c/o awaiting placement  03/27/2025 pt with new onset a fib, h/o severe systolic CHF in the past known EF 10%.  PT changed to metoprolol and converted to sinus rhythm overnight HR currently 70-80s and denies chest pain or distress, he is already on DOAC due to recent PE.  Pt sitting up in chair this am, he wants to go home, however is agreeable to SNF at the nursing home.  He is still very weak.  Needs PT to resume prior independent living situation.  03/28/2025 pt confused at time, awaiting appeal with insurance as they have denied him SNF, he is not safe to go home, needs some PT to try to resume prior functional level.  03/29/2025 pt with some sputum with coughed up some blood, bright red still waiting on SNF placement, pt more confused last few days, not eating much, family brought him some outside food and he did not eat that [facility] either.  03/30/2025 pt with worsening mental status yesterday, ABG showed some hypoxia PAO2 was 44, placed on 5L via oximizer, CT negative for mass, possible bilateral infiltrates, started on iv zosyn, pt still sleepy,  minimally responsive, repeat CT shows severe ischemic disease. Echo shows ef 5-10% with severe valvular heart disease and grade 3 diastolic dysfunction.  He is not eating much, Started D5 yesterrday due to low blood sugars, encourage po, but he is not eating any better today.  Will add clinimix.  3/31/2025 sugars a little better   Getting iv nutrition   Mental status poor overall  Spoke with family today about poor prognosis agree to DNR and hospice eval today   4/1/2025 a little more awake today but remains very confused  Cr trending down to 1.3 from 1.72  Case mgmt working on placement

## 2025-03-21 NOTE — PLAN OF CARE
Patient's daughter and patient notified the [facility] is possibly willing to accept the patient for SNF.

## 2025-03-21 NOTE — PROGRESS NOTES
Hospital Medicine  Progress Note    Patient Name: William South  MRN: [id number]  Status: IP- Inpatient   Admission Date: 3/17/2025  Length of Stay: 3  Date of Service: 03/21/2025       CC: hospital follow-up for        SUBJECTIVE      69-year-old male with past medical history significant combined congestive heart failure, hypertension, anxiety/depression, tobacco abuse and polysubstance abuse who presented to ED with complaints of shortness for breath leg and feet swelling patient noted increased shortness for breath especially with exertion but that has progressing to when he is at rest over the last few days.  Patient stated he is having difficulty even was walking short distances with increasing shortness a breath.  He noticed swelling to his bilateral feet migraine up his legs.  Patient was recently seen at the end of February with the use prescribed Lasix Xarelto anticoagulation in initiated on call immediate therapy for congestive heart failure.  At that time patient was also diagnosed with a pulmonary embolism he was discharged in his medication instructed to be compliant but patient stated he is not being compliant with medications he got confused with the administration.  During initial evaluation ED he was found to have urine drug screen that was positive for cocaine.  Denies any fever or chills nausea or vomiting.     3/18/2025  Diuresed well overnight   Sob better     Today: Patient seen and examined at bedside, and chart reviewed.       MEDICATIONS   Scheduled   aspirin  81 mg Oral Daily    atorvastatin  40 mg Oral QHS    carvediloL  3.125 mg Oral BID    EScitalopram oxalate  10 mg Oral Daily    famotidine  20 mg Oral Daily    rivaroxaban  20 mg Oral Daily with dinner    sacubitriL-valsartan  1 tablet Oral BID     Continuous Infusions        PHYSICAL EXAM   VITALS: T 97.7 °F (36.5 °C)   /74   P 79   RR 18   O2 95 %    GENERAL: Awake and in NAD  LUNGS: CTA B/L  CVS: Normal rate  GI/: Soft, NT,  bowel sounds positive.  EXTREMITIES: No peripheral edema  NEURO: AAOx3  PSYCH: Cooperative      LABS   CBC  Recent Labs     03/20/25  0455 03/21/25  0738   WBC 5.32 5.41   RBC 4.90 5.01   HGB 15.1 15.6   HCT 43.1 43.9   MCV 88.0 87.6   MCH 30.8 31.1   MCHC 35.0 35.5   RDW 14.6 14.6    200     CHEM  Recent Labs     03/20/25  0455 03/21/25  0738   * 128*   K 3.8 3.5   CO2 25 31   BUN 33* 30*   CREATININE 1.27* 1.13   GLUCOSE 76 71   CALCIUM 8.6 8.1*         MICROBIOLOGY     Microbiology Results (last 7 days)       Procedure Component Value Units Date/Time    Urine culture [[id number]] Collected: 03/17/25 1139    Order Status: Completed Specimen: Urine Updated: 03/19/25 0937     Urine Culture No Significant Growth              DIAGNOSTICS   CTA Chest Non-Coronary (PE Studies)  Addendum: There are typographical errors in #5 in the IMPRESSION.  This sentence  should read:     5.  Similar posterior dependent layering right pleural effusion with  marginating alveolar opacities.     Electronically signed by: Wiley Pepe MD   Date:    03/17/2025   Time:    14:33  Narrative: CT ANGIOGRAM OF THE CHEST WITH AND WITHOUT CONTRAST AND WITH 3D POSTPROCESSING:    CPT: 35239, 58152    Total DLP: 347.0 mGy-cm. Automatic exposure control was utilized to limit the radiation dose to the patient.  Total contrast: 96 mL in unspecified peripheral vein.    History:  Worsening shortness of breath with pulmonary thromboemboli on comparison exam from 02/26/2025.    Comparison exam: As above.    Technique: Multiple thin cut axial images were acquired a through the chest before and during intravenous contrast administration.  The source images were viewed at the workstation. MIP and volume rendering was performed on the source images with 3-D reconstructions. The angiographic reconstructions were reviewed by the radiologist.    Findings:  This study is significantly limited due to poor contrast bolus timing with maximum  opacification of the left innominate and subclavian veins, left innominate vein, SVC, and right atrium, which causes significant streak artifact.  There is poor mixing of contrast in the right greater left pulmonary arteries.  As on the prior exam, there are soft tissue density foci occluding segmental and subsegmental pulmonary arteries to the right lower lobe and questionably to the right middle and upper lobes and multiple peripheral subsegmental pulmonary arteries on the left due to the artifact from poor contrast bolus timing and other factors.  There is no evidence for right heart strain.  There is cardiomegaly.  The thoracic aorta is normal in caliber, but there is no contrast opacification to a be able to evaluate for possible dissection.  No obvious mediastinal or hilar adenopathy is identified, but evaluation for this is limited due to poor contrast bolus timing.  The thyroid gland is obscured in the thoracic inlet.  There is a similar sized posterior dependent right pleural effusion.  No left effusion or pneumothorax is identified.  There are emphysematous changes in both lungs more prominent in the apices.  There are patchy alveolar opacities in the right greater left lung bases.  Impression: 1. This study is significantly limited due to poor contrast bolus timing, as above, and there is poor mixing of contrast in the right greater left pulmonary arteries.  2. As on the prior exam, there are occlusive pulmonary thromboemboli in segmental and subsegmental pulmonary arteries to the right lower lobe and questionably to the right middle and upper lobes and multiple peripheral left subsegmental pulmonary arteries  versus artifact.  There is no evidence for right heart strain.  3. On the prior exam, there was nonocclusive thrombus in the distal right main pulmonary artery.  On today's exam, this appears more as poor mixing of contrast without definite thrombus.  4. Cardiomegaly.  5. Similar posterior dependent  layering right pleural effusion with marginating there are opacities.    Electronically signed by: Wiley Pepe MD  Date:    03/17/2025  Time:    12:47  X-Ray Chest AP Portable  Narrative: EXAMINATION:  XR CHEST AP PORTABLE    CLINICAL HISTORY:  Chest pain and shortness of breath with history of pulmonary thromboemboli.    TECHNIQUE:  Single frontal view of the chest was performed.    COMPARISON:  02/26/2025.    FINDINGS:  There is a similar right posterior dependent lying pleural effusion with some extension in the right lung base and costophrenic angle and fissures.  Alveolar opacities marginate the effusion.  There is interval decrease in previously seen alveolar opacities in the left lung base.  No left effusion or pneumothorax is present.  There is similar cardiomegaly and prominent main pulmonary vessels.  Impression: 1. Similar right pleural effusion with marginating alveolar opacities.  2. Alveolar opacities in left lung base have decreased.  3. Similar cardiomegaly and prominent central pulmonary vessels.    Electronically signed by: Wiley Pepe MD  Date:    03/17/2025  Time:    14:31        Assessment/Plan:      There are no hospital problems to display for this patient.     VTE Risk Mitigation (From admission, onward)              Ordered       rivaroxaban tablet 20 mg  With dinner         03/17/25 1716                          Combined CHF exacerbation:  Acute on chronic  With associated dyspnea, chest tightness, and BLE edema  Pro-BNP 18,300  CTA chest shows occlusive pulmonary thromboembolism segmental subsegmental pulmonary arteries to the right lower lobe questions to right middle and upper lobes and multiple peripheral left subsegmental pulmonary arteries versus on vanc.  Seen as on prior exam.  Cardiomegaly.  Posterior dependent layering right pleural effusion.  Diuretic therapy furosemide 40 mg IV x1 given in ED  Patient with a history of noncompliance   He was discharged on Lasix, Entresto,  Xarelto from last admission but patient stated he has not been taking his medications as he has been confused on how to administer  Urine drug screen was positive for cocaine  Daily weights, I/Os  Resumed Entresto, Xarelto     Pulmonary embolism:   Diagnosed on last admission   Discharged on Xarelto patient has been taking   Resume Xarelto this time of admission  Importance of medication adherence was conveyed to patient     HTN:  Resume home meds once reconciled  Give IV antihypertensive for prn use with parameters           Code: FULL   PPX: BSCDs              Yossi Hernández MD  [facility] Medicine

## 2025-03-21 NOTE — ASSESSMENT & PLAN NOTE
"Patient has Systolic (HFrEF) heart failure that is Acute on chronic. On presentation their CHF was decompensated. Evidence of decompensated CHF on presentation includes: shortness of breath. The etiology of their decompensation is likely dietary indiscretion. Most recent BNP and echo results are listed below.  No results for input(s): "BNP" in the last 72 hours.  Latest ECHO  Results for orders placed during the hospital encounter of 02/26/25    Echo    Interpretation Summary    Left Ventricle: There is severely reduced systolic function with a visually estimated ejection fraction of 5 - 10%. Grade III diastolic dysfunction.    Right Ventricle: Systolic function is mildly reduced.    Aortic Valve: There is mild (1+) aortic regurgitation.    Mitral Valve: There is moderate to severe (3+) regurgitation.    Tricuspid Valve: There is moderate to severe (3+) regurgitation.    Pulmonic Valve: There is mild (1+) regurgitation.    Current Heart Failure Medications  carvediloL tablet 3.125 mg, 2 times daily, Oral  sacubitriL-valsartan 24-26 mg per tablet 1 tablet, 2 times daily, Oral  hydrALAZINE injection 10 mg, Every 6 hours PRN, Intravenous    Plan  - Monitor strict I&Os and daily weights.    - Place on telemetry  - Low sodium diet  - Place on fluid restriction of 1.5 L.   - Cardiology has been consulted  - The patient's volume status is improving but not at their baseline as indicated by shortness of breath  -       "

## 2025-03-21 NOTE — ASSESSMENT & PLAN NOTE
Patient's blood pressure range in the last 24 hours was: BP  Min: 102/76  Max: 136/96.The patient's inpatient anti-hypertensive regimen is listed below:  Current Antihypertensives  carvediloL tablet 3.125 mg, 2 times daily, Oral  hydrALAZINE injection 10 mg, Every 6 hours PRN, Intravenous    Plan  - BP is controlled, no changes needed to their regimen  -

## 2025-03-21 NOTE — PROGRESS NOTES
[facility] Medicine  Progress Note    Patient Name: William South  MRN: [id number]  Patient Class: IP- Inpatient   Admission Date: 3/17/2025  Length of Stay: 3 days  Attending Physician: Yossi Hernández MD  Primary Care Provider: Bette Soliz NP        Subjective     Principal Problem:Severe systolic congestive heart failure        HPI:  69-year-old male with past medical history significant combined congestive heart failure, hypertension, anxiety/depression, tobacco abuse and polysubstance abuse who presented to ED with complaints of shortness for breath leg and feet swelling patient noted increased shortness for breath especially with exertion but that has progressing to when he is at rest over the last few days.  Patient stated he is having difficulty even was walking short distances with increasing shortness a breath.  He noticed swelling to his bilateral feet migraine up his legs.  Patient was recently seen at the end of February with the use prescribed Lasix Xarelto anticoagulation in initiated on call immediate therapy for congestive heart failure.  At that time patient was also diagnosed with a pulmonary embolism he was discharged in his medication instructed to be compliant but patient stated he is not being compliant with medications he got confused with the administration.  During initial evaluation ED he was found to have urine drug screen that was positive for cocaine.  Denies any fever or chills nausea or vomiting.          Past Medical History:   Diagnosis Date    Anxiety disorder, unspecified      CHF (congestive heart failure)      Depression      Hypertension      Hyponatremia        Overview/Hospital Course:  3/19/2025  Sob much better today   Case mgmt working on snf     3/20/2025  No new c/o  Work with PT  Sob better     Today: Patient seen and examined at bedside, and chart reviewed.   03/21/2025 patient resting comfortably awaiting placement.  He denies  any shortness of breath currently.  We will continue his current medications labs look good we will repeat some in the morning.      Interval History:     Review of Systems   Constitutional:  Positive for appetite change and fatigue. Negative for activity change and fever.   HENT:  Negative for congestion, ear pain, nosebleeds, sinus pressure, sore throat, tinnitus and trouble swallowing.    Eyes:  Negative for pain and itching.   Respiratory:  Positive for shortness of breath. Negative for apnea, cough, choking, chest tightness and wheezing.    Cardiovascular:  Negative for chest pain and leg swelling.   Gastrointestinal:  Negative for abdominal distention, abdominal pain, constipation, diarrhea, nausea and vomiting.   Endocrine: Negative for cold intolerance and heat intolerance.   Genitourinary:  Negative for dysuria, enuresis, frequency, penile swelling, scrotal swelling, testicular pain and urgency.   Musculoskeletal:  Negative for arthralgias, back pain, gait problem, joint swelling, myalgias and neck pain.   Skin:  Negative for color change, pallor, rash and wound.   Allergic/Immunologic: Negative for environmental allergies and food allergies.   Neurological:  Negative for dizziness, tremors, seizures, syncope, numbness and headaches.   Psychiatric/Behavioral:  Negative for agitation, behavioral problems, dysphoric mood, hallucinations, self-injury and suicidal ideas. The patient is not nervous/anxious and is not hyperactive.      Objective:     Vital Signs (Most Recent):  Temp: 97.7 °F (36.5 °C) (03/21/25 0800)  Pulse: 85 (03/21/25 0806)  Resp: 18 (03/21/25 0806)  BP: (!) 136/96 (03/21/25 0800)  SpO2: 96 % (03/21/25 0806) Vital Signs (24h Range):  Temp:  [96.6 °F (35.9 °C)-97.7 °F (36.5 °C)] 97.7 °F (36.5 °C)  Pulse:  [82-90] 85  Resp:  [18-28] 18  SpO2:  [91 %-96 %] 96 %  BP: (102-136)/(75-96) 136/96     Weight: 65.9 kg (145 lb 3.2 oz)  Body mass index is 22.74 kg/m².    Intake/Output Summary (Last 24  hours) at 3/21/2025 0857  Last data filed at 3/21/2025 0823  Gross per 24 hour   Intake 1980 ml   Output 650 ml   Net 1330 ml         Physical Exam  Constitutional:       General: He is not in acute distress.     Appearance: Normal appearance. He is ill-appearing. He is not toxic-appearing or diaphoretic.   HENT:      Head: Normocephalic and atraumatic.      Right Ear: External ear normal.      Left Ear: External ear normal.      Nose: Nose normal. No congestion or rhinorrhea.      Mouth/Throat:      Mouth: Mucous membranes are moist.      Pharynx: Oropharynx is clear.   Eyes:      Extraocular Movements: Extraocular movements intact.      Conjunctiva/sclera: Conjunctivae normal.      Pupils: Pupils are equal, round, and reactive to light.   Neck:      Vascular: No carotid bruit.   Cardiovascular:      Rate and Rhythm: Normal rate and regular rhythm.      Pulses: Normal pulses.      Heart sounds: Normal heart sounds. No murmur heard.  Pulmonary:      Effort: Pulmonary effort is normal. No respiratory distress.      Breath sounds: Rales present. No wheezing or rhonchi.   Abdominal:      General: Abdomen is flat. Bowel sounds are normal. There is no distension.      Palpations: Abdomen is soft.      Tenderness: There is no abdominal tenderness. There is no guarding.   Musculoskeletal:         General: No swelling or tenderness. Normal range of motion.      Cervical back: Normal range of motion and neck supple. No tenderness.      Right lower leg: No edema.      Left lower leg: No edema.   Lymphadenopathy:      Cervical: No cervical adenopathy.   Skin:     General: Skin is warm and dry.      Coloration: Skin is not jaundiced or pale.   Neurological:      General: No focal deficit present.      Mental Status: He is alert and oriented to person, place, and time. Mental status is at baseline.      Cranial Nerves: No cranial nerve deficit.      Motor: No weakness.      Coordination: Coordination normal.      Gait: Gait  "normal.   Psychiatric:         Mood and Affect: Mood normal.         Behavior: Behavior normal.         Thought Content: Thought content normal.         Judgment: Judgment normal.               Significant Labs: All pertinent labs within the past 24 hours have been reviewed.    Significant Imaging: I have reviewed all pertinent imaging results/findings within the past 24 hours.      Assessment & Plan  Severe systolic congestive heart failure  Patient has Systolic (HFrEF) heart failure that is Acute on chronic. On presentation their CHF was decompensated. Evidence of decompensated CHF on presentation includes: shortness of breath. The etiology of their decompensation is likely dietary indiscretion. Most recent BNP and echo results are listed below.  No results for input(s): "BNP" in the last 72 hours.  Latest ECHO  Results for orders placed during the hospital encounter of 02/26/25    Echo    Interpretation Summary    Left Ventricle: There is severely reduced systolic function with a visually estimated ejection fraction of 5 - 10%. Grade III diastolic dysfunction.    Right Ventricle: Systolic function is mildly reduced.    Aortic Valve: There is mild (1+) aortic regurgitation.    Mitral Valve: There is moderate to severe (3+) regurgitation.    Tricuspid Valve: There is moderate to severe (3+) regurgitation.    Pulmonic Valve: There is mild (1+) regurgitation.    Current Heart Failure Medications  carvediloL tablet 3.125 mg, 2 times daily, Oral  sacubitriL-valsartan 24-26 mg per tablet 1 tablet, 2 times daily, Oral  hydrALAZINE injection 10 mg, Every 6 hours PRN, Intravenous    Plan  - Monitor strict I&Os and daily weights.    - Place on telemetry  - Low sodium diet  - Place on fluid restriction of 1.5 L.   - Cardiology has been consulted  - The patient's volume status is improving but not at their baseline as indicated by shortness of breath  -       Substance abuse  Patient urged to stop using drugs    Primary " hypertension  Patient's blood pressure range in the last 24 hours was: BP  Min: 102/76  Max: 136/96.The patient's inpatient anti-hypertensive regimen is listed below:  Current Antihypertensives  carvediloL tablet 3.125 mg, 2 times daily, Oral  hydrALAZINE injection 10 mg, Every 6 hours PRN, Intravenous    Plan  - BP is controlled, no changes needed to their regimen  -   VTE Risk Mitigation (From admission, onward)           Ordered     rivaroxaban tablet 20 mg  With dinner         03/17/25 1716                    Discharge Planning   CHASIDY: 3/24/2025     Code Status: Full Code   Medical Readiness for Discharge Date:   Discharge Plan A: Skilled Nursing Facility   Discharge Delays: (!) Post-Acute Set-up                    Mor Sanders MD  Department of Hospital Medicine   [facility]

## 2025-03-21 NOTE — HPI
69-year-old male with past medical history significant combined congestive heart failure, hypertension, anxiety/depression, tobacco abuse and polysubstance abuse who presented to ED with complaints of shortness for breath leg and feet swelling patient noted increased shortness for breath especially with exertion but that has progressing to when he is at rest over the last few days.  Patient stated he is having difficulty even was walking short distances with increasing shortness a breath.  He noticed swelling to his bilateral feet migraine up his legs.  Patient was recently seen at the end of February with the use prescribed Lasix Xarelto anticoagulation in initiated on call immediate therapy for congestive heart failure.  At that time patient was also diagnosed with a pulmonary embolism he was discharged in his medication instructed to be compliant but patient stated he is not being compliant with medications he got confused with the administration.  During initial evaluation ED he was found to have urine drug screen that was positive for cocaine.  Denies any fever or chills nausea or vomiting.          Past Medical History:   Diagnosis Date    Anxiety disorder, unspecified      CHF (congestive heart failure)      Depression      Hypertension      Hyponatremia

## 2025-03-22 PROBLEM — K21.9 GERD (GASTROESOPHAGEAL REFLUX DISEASE): Status: ACTIVE | Noted: 2025-03-22

## 2025-03-22 LAB
ALBUMIN SERPL-MCNC: 2.8 G/DL (ref 3.4–5)
ALBUMIN/GLOB SERPL: 1.1 RATIO
ALP SERPL-CCNC: 155 UNIT/L (ref 50–144)
ALT SERPL-CCNC: 70 UNIT/L (ref 1–45)
ANION GAP SERPL CALC-SCNC: 4 MEQ/L (ref 2–13)
AST SERPL-CCNC: 45 UNIT/L (ref 17–59)
BASOPHILS # BLD AUTO: 0.02 X10(3)/MCL (ref 0.01–0.08)
BASOPHILS NFR BLD AUTO: 0.4 % (ref 0.1–1.2)
BILIRUB SERPL-MCNC: 0.8 MG/DL (ref 0–1)
BUN SERPL-MCNC: 25 MG/DL (ref 7–20)
CALCIUM SERPL-MCNC: 7.9 MG/DL (ref 8.4–10.2)
CHLORIDE SERPL-SCNC: 94 MMOL/L (ref 98–110)
CO2 SERPL-SCNC: 29 MMOL/L (ref 21–32)
CREAT SERPL-MCNC: 1.04 MG/DL (ref 0.66–1.25)
CREAT/UREA NIT SERPL: 24 (ref 12–20)
EOSINOPHIL # BLD AUTO: 0.07 X10(3)/MCL (ref 0.04–0.54)
EOSINOPHIL NFR BLD AUTO: 1.4 % (ref 0.7–7)
ERYTHROCYTE [DISTWIDTH] IN BLOOD BY AUTOMATED COUNT: 14.6 %
GFR SERPLBLD CREATININE-BSD FMLA CKD-EPI: 78 ML/MIN/1.73/M2
GLOBULIN SER-MCNC: 2.6 GM/DL (ref 2–3.9)
GLUCOSE SERPL-MCNC: 77 MG/DL (ref 70–115)
HCT VFR BLD AUTO: 40.7 % (ref 36–52)
HGB BLD-MCNC: 14.1 G/DL (ref 13–18)
IMM GRANULOCYTES # BLD AUTO: 0.01 X10(3)/MCL (ref 0–0.03)
IMM GRANULOCYTES NFR BLD AUTO: 0.2 % (ref 0–0.5)
LYMPHOCYTES # BLD AUTO: 1.34 X10(3)/MCL (ref 1.32–3.57)
LYMPHOCYTES NFR BLD AUTO: 26.4 % (ref 20–55)
MAGNESIUM SERPL-MCNC: 1.8 MG/DL (ref 1.8–2.4)
MCH RBC QN AUTO: 30.6 PG (ref 27–34)
MCHC RBC AUTO-ENTMCNC: 34.6 G/DL (ref 31–37)
MCV RBC AUTO: 88.3 FL (ref 79–99)
MONOCYTES # BLD AUTO: 0.58 X10(3)/MCL (ref 0.3–0.82)
MONOCYTES NFR BLD AUTO: 11.4 % (ref 4.7–12.5)
NEUTROPHILS # BLD AUTO: 3.06 X10(3)/MCL (ref 1.78–5.38)
NEUTROPHILS NFR BLD AUTO: 60.2 % (ref 37–73)
NRBC BLD AUTO-RTO: 0 %
PLATELET # BLD AUTO: 190 X10(3)/MCL (ref 140–371)
PMV BLD AUTO: 9.9 FL (ref 9.4–12.4)
POTASSIUM SERPL-SCNC: 3 MMOL/L (ref 3.5–5.1)
PROT SERPL-MCNC: 5.4 GM/DL (ref 6.3–8.2)
RBC # BLD AUTO: 4.61 X10(6)/MCL (ref 4–6)
SODIUM SERPL-SCNC: 127 MMOL/L (ref 136–145)
WBC # BLD AUTO: 5.08 X10(3)/MCL (ref 4–11.5)

## 2025-03-22 PROCEDURE — 21400001 HC TELEMETRY ROOM

## 2025-03-22 PROCEDURE — 85025 COMPLETE CBC W/AUTO DIFF WBC: CPT | Performed by: FAMILY MEDICINE

## 2025-03-22 PROCEDURE — 94761 N-INVAS EAR/PLS OXIMETRY MLT: CPT

## 2025-03-22 PROCEDURE — 36415 COLL VENOUS BLD VENIPUNCTURE: CPT | Performed by: INTERNAL MEDICINE

## 2025-03-22 PROCEDURE — 25000003 PHARM REV CODE 250: Performed by: INTERNAL MEDICINE

## 2025-03-22 PROCEDURE — 25000003 PHARM REV CODE 250: Performed by: FAMILY MEDICINE

## 2025-03-22 PROCEDURE — 83735 ASSAY OF MAGNESIUM: CPT | Performed by: INTERNAL MEDICINE

## 2025-03-22 PROCEDURE — 80053 COMPREHEN METABOLIC PANEL: CPT | Performed by: INTERNAL MEDICINE

## 2025-03-22 PROCEDURE — 25000003 PHARM REV CODE 250: Performed by: EMERGENCY MEDICINE

## 2025-03-22 RX ORDER — POTASSIUM CHLORIDE 20 MEQ/1
40 TABLET, EXTENDED RELEASE ORAL ONCE
Status: COMPLETED | OUTPATIENT
Start: 2025-03-22 | End: 2025-03-22

## 2025-03-22 RX ORDER — CALCIUM CARBONATE 200(500)MG
500 TABLET,CHEWABLE ORAL 4 TIMES DAILY
Status: DISCONTINUED | OUTPATIENT
Start: 2025-03-22 | End: 2025-04-02 | Stop reason: HOSPADM

## 2025-03-22 RX ORDER — FAMOTIDINE 20 MG/1
20 TABLET, FILM COATED ORAL 2 TIMES DAILY
Status: DISCONTINUED | OUTPATIENT
Start: 2025-03-22 | End: 2025-03-28

## 2025-03-22 RX ADMIN — ESCITALOPRAM OXALATE 10 MG: 10 TABLET ORAL at 09:03

## 2025-03-22 RX ADMIN — SACUBITRIL AND VALSARTAN 1 TABLET: 24; 26 TABLET, FILM COATED ORAL at 09:03

## 2025-03-22 RX ADMIN — ASPIRIN 81 MG: 81 TABLET ORAL at 09:03

## 2025-03-22 RX ADMIN — RIVAROXABAN 20 MG: 20 TABLET, FILM COATED ORAL at 04:03

## 2025-03-22 RX ADMIN — ATORVASTATIN CALCIUM 40 MG: 40 TABLET, FILM COATED ORAL at 09:03

## 2025-03-22 RX ADMIN — CARVEDILOL 3.12 MG: 3.12 TABLET, FILM COATED ORAL at 09:03

## 2025-03-22 RX ADMIN — CALCIUM CARBONATE (ANTACID) CHEW TAB 500 MG 500 MG: 500 CHEW TAB at 11:03

## 2025-03-22 RX ADMIN — FAMOTIDINE 20 MG: 20 TABLET, FILM COATED ORAL at 09:03

## 2025-03-22 RX ADMIN — CALCIUM CARBONATE (ANTACID) CHEW TAB 500 MG 500 MG: 500 CHEW TAB at 09:03

## 2025-03-22 RX ADMIN — Medication 6 MG: at 09:03

## 2025-03-22 RX ADMIN — CALCIUM CARBONATE (ANTACID) CHEW TAB 500 MG 500 MG: 500 CHEW TAB at 04:03

## 2025-03-22 RX ADMIN — POTASSIUM CHLORIDE 40 MEQ: 1500 TABLET, EXTENDED RELEASE ORAL at 11:03

## 2025-03-22 NOTE — PLAN OF CARE
Problem: Adult Inpatient Plan of Care  Goal: Plan of Care Review  Outcome: Progressing  Goal: Patient-Specific Goal (Individualized)  Outcome: Progressing  Goal: Absence of Hospital-Acquired Illness or Injury  Outcome: Progressing  Goal: Optimal Comfort and Wellbeing  Outcome: Progressing  Goal: Readiness for Transition of Care  Outcome: Progressing     Problem: Fall Injury Risk  Goal: Absence of Fall and Fall-Related Injury  Outcome: Progressing     Problem: Comorbidity Management  Goal: Maintenance of Heart Failure Symptom Control  Outcome: Progressing  Goal: Blood Pressure in Desired Range  Outcome: Progressing     Problem: Heart Failure  Goal: Optimal Coping  Outcome: Progressing  Goal: Optimal Cardiac Output  Outcome: Progressing  Goal: Stable Heart Rate and Rhythm  Outcome: Progressing  Goal: Optimal Functional Ability  Outcome: Progressing  Goal: Fluid and Electrolyte Balance  Outcome: Progressing  Goal: Improved Oral Intake  Outcome: Progressing  Goal: Effective Oxygenation and Ventilation  Outcome: Progressing  Goal: Effective Breathing Pattern During Sleep  Outcome: Progressing     Problem: Excessive Substance Use  Goal: Optimized Energy Level (Excessive Substance Use)  Outcome: Progressing  Goal: Improved Behavioral Control (Excessive Substance Use)  Outcome: Progressing  Goal: Increased Participation and Engagement (Excessive Substance Use)  Outcome: Progressing  Goal: Improved Physiologic Symptoms (Excessive Substance Use)  Outcome: Progressing  Goal: Enhanced Social, Occupational or Functional Skills (Excessive Substance Use)  Outcome: Progressing

## 2025-03-22 NOTE — PROGRESS NOTES
[facility] Medicine  Progress Note    Patient Name: William South  MRN: [id number]  Patient Class: IP- Inpatient   Admission Date: 3/17/2025  Length of Stay: 4 days  Attending Physician: Yossi Hernández MD  Primary Care Provider: Bette Soliz NP        Subjective     Principal Problem:Severe systolic congestive heart failure        HPI:  69-year-old male with past medical history significant combined congestive heart failure, hypertension, anxiety/depression, tobacco abuse and polysubstance abuse who presented to ED with complaints of shortness for breath leg and feet swelling patient noted increased shortness for breath especially with exertion but that has progressing to when he is at rest over the last few days.  Patient stated he is having difficulty even was walking short distances with increasing shortness a breath.  He noticed swelling to his bilateral feet migraine up his legs.  Patient was recently seen at the end of February with the use prescribed Lasix Xarelto anticoagulation in initiated on call immediate therapy for congestive heart failure.  At that time patient was also diagnosed with a pulmonary embolism he was discharged in his medication instructed to be compliant but patient stated he is not being compliant with medications he got confused with the administration.  During initial evaluation ED he was found to have urine drug screen that was positive for cocaine.  Denies any fever or chills nausea or vomiting.          Past Medical History:   Diagnosis Date    Anxiety disorder, unspecified      CHF (congestive heart failure)      Depression      Hypertension      Hyponatremia        Overview/Hospital Course:  3/19/2025  Sob much better today   Case mgmt working on snf     3/20/2025  No new c/o  Work with PT  Sob better     Today: Patient seen and examined at bedside, and chart reviewed.   03/21/2025 patient resting comfortably awaiting placement.  He denies  any shortness of breath currently.  We will continue his current medications labs look good we will repeat some in the morning.  03/22/2025 patient doing well waiting for placement.  He complains of a little bit of acid indigestion requests some Tums.  He states his bowels are moving normally.  Labs look good repeat some labs tomorrow and prescribe him some Tums.    Interval History:     Review of Systems   Constitutional:  Positive for appetite change and fatigue. Negative for activity change and fever.   HENT:  Negative for congestion, ear pain, nosebleeds, sinus pressure, sore throat, tinnitus and trouble swallowing.    Eyes:  Negative for pain and itching.   Respiratory:  Positive for shortness of breath. Negative for apnea, cough, choking, chest tightness and wheezing.    Cardiovascular:  Negative for chest pain and leg swelling.   Gastrointestinal:  Negative for abdominal distention, abdominal pain, constipation, diarrhea, nausea and vomiting.   Endocrine: Negative for cold intolerance and heat intolerance.   Genitourinary:  Negative for dysuria, enuresis, frequency, penile swelling, scrotal swelling, testicular pain and urgency.   Musculoskeletal:  Negative for arthralgias, back pain, gait problem, joint swelling, myalgias and neck pain.   Skin:  Negative for color change, pallor, rash and wound.   Allergic/Immunologic: Negative for environmental allergies and food allergies.   Neurological:  Negative for dizziness, tremors, seizures, syncope, numbness and headaches.   Psychiatric/Behavioral:  Negative for agitation, behavioral problems, dysphoric mood, hallucinations, self-injury and suicidal ideas. The patient is not nervous/anxious and is not hyperactive.      Objective:     Vital Signs (Most Recent):  Temp: 98.4 °F (36.9 °C) (03/22/25 1056)  Pulse: 83 (03/22/25 1056)  Resp: 20 (03/22/25 1056)  BP: 113/83 (03/22/25 1056)  SpO2: 96 % (03/22/25 1056) Vital Signs (24h Range):  Temp:  [96.9 °F (36.1 °C)-98.5  °F (36.9 °C)] 98.4 °F (36.9 °C)  Pulse:  [79-96] 83  Resp:  [18-20] 20  SpO2:  [90 %-96 %] 96 %  BP: ()/(66-86) 113/83     Weight: 65.9 kg (145 lb 3.2 oz)  Body mass index is 22.74 kg/m².    Intake/Output Summary (Last 24 hours) at 3/22/2025 1110  Last data filed at 3/22/2025 0824  Gross per 24 hour   Intake 1440 ml   Output --   Net 1440 ml         Physical Exam  Constitutional:       General: He is not in acute distress.     Appearance: Normal appearance. He is ill-appearing. He is not toxic-appearing or diaphoretic.   HENT:      Head: Normocephalic and atraumatic.      Right Ear: External ear normal.      Left Ear: External ear normal.      Nose: Nose normal. No congestion or rhinorrhea.      Mouth/Throat:      Mouth: Mucous membranes are moist.      Pharynx: Oropharynx is clear.   Eyes:      Extraocular Movements: Extraocular movements intact.      Conjunctiva/sclera: Conjunctivae normal.      Pupils: Pupils are equal, round, and reactive to light.   Neck:      Vascular: No carotid bruit.   Cardiovascular:      Rate and Rhythm: Normal rate and regular rhythm.      Pulses: Normal pulses.      Heart sounds: Normal heart sounds. No murmur heard.  Pulmonary:      Effort: Pulmonary effort is normal. No respiratory distress.      Breath sounds: Rales present. No wheezing or rhonchi.   Abdominal:      General: Abdomen is flat. Bowel sounds are normal. There is no distension.      Palpations: Abdomen is soft.      Tenderness: There is no abdominal tenderness. There is no guarding.   Musculoskeletal:         General: No swelling or tenderness. Normal range of motion.      Cervical back: Normal range of motion and neck supple. No tenderness.      Right lower leg: No edema.      Left lower leg: No edema.   Lymphadenopathy:      Cervical: No cervical adenopathy.   Skin:     General: Skin is warm and dry.      Coloration: Skin is not jaundiced or pale.   Neurological:      General: No focal deficit present.      Mental  "Status: He is alert and oriented to person, place, and time. Mental status is at baseline.      Cranial Nerves: No cranial nerve deficit.      Motor: No weakness.      Coordination: Coordination normal.      Gait: Gait normal.   Psychiatric:         Mood and Affect: Mood normal.         Behavior: Behavior normal.         Thought Content: Thought content normal.         Judgment: Judgment normal.               Significant Labs: All pertinent labs within the past 24 hours have been reviewed.    Significant Imaging: I have reviewed all pertinent imaging results/findings within the past 24 hours.      Assessment & Plan  Severe systolic congestive heart failure  Patient has Systolic (HFrEF) heart failure that is Acute on chronic. On presentation their CHF was decompensated. Evidence of decompensated CHF on presentation includes: shortness of breath. The etiology of their decompensation is likely dietary indiscretion. Most recent BNP and echo results are listed below.  No results for input(s): "BNP" in the last 72 hours.  Latest ECHO  Results for orders placed during the hospital encounter of 02/26/25    Echo    Interpretation Summary    Left Ventricle: There is severely reduced systolic function with a visually estimated ejection fraction of 5 - 10%. Grade III diastolic dysfunction.    Right Ventricle: Systolic function is mildly reduced.    Aortic Valve: There is mild (1+) aortic regurgitation.    Mitral Valve: There is moderate to severe (3+) regurgitation.    Tricuspid Valve: There is moderate to severe (3+) regurgitation.    Pulmonic Valve: There is mild (1+) regurgitation.    Current Heart Failure Medications  carvediloL tablet 3.125 mg, 2 times daily, Oral  sacubitriL-valsartan 24-26 mg per tablet 1 tablet, 2 times daily, Oral  hydrALAZINE injection 10 mg, Every 6 hours PRN, Intravenous    Plan  - Monitor strict I&Os and daily weights.    - Place on telemetry  - Low sodium diet  - Place on fluid restriction of 1.5 " L.   - Cardiology has been consulted  - The patient's volume status is improving but not at their baseline as indicated by shortness of breath  -       Substance abuse  Patient urged to stop using drugs    Primary hypertension  Patient's blood pressure range in the last 24 hours was: BP  Min: 95/70  Max: 115/86.The patient's inpatient anti-hypertensive regimen is listed below:  Current Antihypertensives  carvediloL tablet 3.125 mg, 2 times daily, Oral  hydrALAZINE injection 10 mg, Every 6 hours PRN, Intravenous    Plan  - BP is controlled, no changes needed to their regimen  -   GERD (gastroesophageal reflux disease)  Tums PRN    VTE Risk Mitigation (From admission, onward)           Ordered     rivaroxaban tablet 20 mg  With dinner         03/17/25 1716                    Discharge Planning   CHASIDY: 3/24/2025     Code Status: Full Code   Medical Readiness for Discharge Date:   Discharge Plan A: Skilled Nursing Facility   Discharge Delays: (!) Post-Acute Set-up                    Mor Sanders MD  Department of Hospital Medicine   [facility]

## 2025-03-22 NOTE — SUBJECTIVE & OBJECTIVE
Interval History:     Review of Systems   Constitutional:  Positive for appetite change and fatigue. Negative for activity change and fever.   HENT:  Negative for congestion, ear pain, nosebleeds, sinus pressure, sore throat, tinnitus and trouble swallowing.    Eyes:  Negative for pain and itching.   Respiratory:  Positive for shortness of breath. Negative for apnea, cough, choking, chest tightness and wheezing.    Cardiovascular:  Negative for chest pain and leg swelling.   Gastrointestinal:  Negative for abdominal distention, abdominal pain, constipation, diarrhea, nausea and vomiting.   Endocrine: Negative for cold intolerance and heat intolerance.   Genitourinary:  Negative for dysuria, enuresis, frequency, penile swelling, scrotal swelling, testicular pain and urgency.   Musculoskeletal:  Negative for arthralgias, back pain, gait problem, joint swelling, myalgias and neck pain.   Skin:  Negative for color change, pallor, rash and wound.   Allergic/Immunologic: Negative for environmental allergies and food allergies.   Neurological:  Negative for dizziness, tremors, seizures, syncope, numbness and headaches.   Psychiatric/Behavioral:  Negative for agitation, behavioral problems, dysphoric mood, hallucinations, self-injury and suicidal ideas. The patient is not nervous/anxious and is not hyperactive.      Objective:     Vital Signs (Most Recent):  Temp: 98.4 °F (36.9 °C) (03/22/25 1056)  Pulse: 83 (03/22/25 1056)  Resp: 20 (03/22/25 1056)  BP: 113/83 (03/22/25 1056)  SpO2: 96 % (03/22/25 1056) Vital Signs (24h Range):  Temp:  [96.9 °F (36.1 °C)-98.5 °F (36.9 °C)] 98.4 °F (36.9 °C)  Pulse:  [79-96] 83  Resp:  [18-20] 20  SpO2:  [90 %-96 %] 96 %  BP: ()/(66-86) 113/83     Weight: 65.9 kg (145 lb 3.2 oz)  Body mass index is 22.74 kg/m².    Intake/Output Summary (Last 24 hours) at 3/22/2025 1110  Last data filed at 3/22/2025 0824  Gross per 24 hour   Intake 1440 ml   Output --   Net 1440 ml         Physical  Exam  Constitutional:       General: He is not in acute distress.     Appearance: Normal appearance. He is ill-appearing. He is not toxic-appearing or diaphoretic.   HENT:      Head: Normocephalic and atraumatic.      Right Ear: External ear normal.      Left Ear: External ear normal.      Nose: Nose normal. No congestion or rhinorrhea.      Mouth/Throat:      Mouth: Mucous membranes are moist.      Pharynx: Oropharynx is clear.   Eyes:      Extraocular Movements: Extraocular movements intact.      Conjunctiva/sclera: Conjunctivae normal.      Pupils: Pupils are equal, round, and reactive to light.   Neck:      Vascular: No carotid bruit.   Cardiovascular:      Rate and Rhythm: Normal rate and regular rhythm.      Pulses: Normal pulses.      Heart sounds: Normal heart sounds. No murmur heard.  Pulmonary:      Effort: Pulmonary effort is normal. No respiratory distress.      Breath sounds: Rales present. No wheezing or rhonchi.   Abdominal:      General: Abdomen is flat. Bowel sounds are normal. There is no distension.      Palpations: Abdomen is soft.      Tenderness: There is no abdominal tenderness. There is no guarding.   Musculoskeletal:         General: No swelling or tenderness. Normal range of motion.      Cervical back: Normal range of motion and neck supple. No tenderness.      Right lower leg: No edema.      Left lower leg: No edema.   Lymphadenopathy:      Cervical: No cervical adenopathy.   Skin:     General: Skin is warm and dry.      Coloration: Skin is not jaundiced or pale.   Neurological:      General: No focal deficit present.      Mental Status: He is alert and oriented to person, place, and time. Mental status is at baseline.      Cranial Nerves: No cranial nerve deficit.      Motor: No weakness.      Coordination: Coordination normal.      Gait: Gait normal.   Psychiatric:         Mood and Affect: Mood normal.         Behavior: Behavior normal.         Thought Content: Thought content normal.          Judgment: Judgment normal.               Significant Labs: All pertinent labs within the past 24 hours have been reviewed.    Significant Imaging: I have reviewed all pertinent imaging results/findings within the past 24 hours.

## 2025-03-23 LAB
ALBUMIN SERPL-MCNC: 3.4 G/DL (ref 3.4–5)
ALBUMIN/GLOB SERPL: 1.3 RATIO
ALP SERPL-CCNC: 191 UNIT/L (ref 50–144)
ALT SERPL-CCNC: 96 UNIT/L (ref 1–45)
ANION GAP SERPL CALC-SCNC: 9 MEQ/L (ref 2–13)
AST SERPL-CCNC: 72 UNIT/L (ref 17–59)
BASOPHILS # BLD AUTO: 0.02 X10(3)/MCL (ref 0.01–0.08)
BASOPHILS NFR BLD AUTO: 0.3 % (ref 0.1–1.2)
BILIRUB SERPL-MCNC: 1.2 MG/DL (ref 0–1)
BUN SERPL-MCNC: 24 MG/DL (ref 7–20)
CALCIUM SERPL-MCNC: 8.3 MG/DL (ref 8.4–10.2)
CHLORIDE SERPL-SCNC: 94 MMOL/L (ref 98–110)
CO2 SERPL-SCNC: 23 MMOL/L (ref 21–32)
CREAT SERPL-MCNC: 0.99 MG/DL (ref 0.66–1.25)
CREAT/UREA NIT SERPL: 24 (ref 12–20)
EOSINOPHIL # BLD AUTO: 0.06 X10(3)/MCL (ref 0.04–0.54)
EOSINOPHIL NFR BLD AUTO: 1 % (ref 0.7–7)
ERYTHROCYTE [DISTWIDTH] IN BLOOD BY AUTOMATED COUNT: 14.7 %
GFR SERPLBLD CREATININE-BSD FMLA CKD-EPI: 82 ML/MIN/1.73/M2
GLOBULIN SER-MCNC: 2.6 GM/DL (ref 2–3.9)
GLUCOSE SERPL-MCNC: 85 MG/DL (ref 70–115)
HCT VFR BLD AUTO: 45.8 % (ref 36–52)
HGB BLD-MCNC: 15.7 G/DL (ref 13–18)
IMM GRANULOCYTES # BLD AUTO: 0.01 X10(3)/MCL (ref 0–0.03)
IMM GRANULOCYTES NFR BLD AUTO: 0.2 % (ref 0–0.5)
LYMPHOCYTES # BLD AUTO: 1.53 X10(3)/MCL (ref 1.32–3.57)
LYMPHOCYTES NFR BLD AUTO: 24.6 % (ref 20–55)
MAGNESIUM SERPL-MCNC: 2 MG/DL (ref 1.8–2.4)
MCH RBC QN AUTO: 30.9 PG (ref 27–34)
MCHC RBC AUTO-ENTMCNC: 34.3 G/DL (ref 31–37)
MCV RBC AUTO: 90.2 FL (ref 79–99)
MONOCYTES # BLD AUTO: 0.83 X10(3)/MCL (ref 0.3–0.82)
MONOCYTES NFR BLD AUTO: 13.3 % (ref 4.7–12.5)
NEUTROPHILS # BLD AUTO: 3.77 X10(3)/MCL (ref 1.78–5.38)
NEUTROPHILS NFR BLD AUTO: 60.6 % (ref 37–73)
NRBC BLD AUTO-RTO: 0 %
PLATELET # BLD AUTO: 181 X10(3)/MCL (ref 140–371)
PMV BLD AUTO: 10.6 FL (ref 9.4–12.4)
POTASSIUM SERPL-SCNC: 3.9 MMOL/L (ref 3.5–5.1)
PROT SERPL-MCNC: 6 GM/DL (ref 6.3–8.2)
RBC # BLD AUTO: 5.08 X10(6)/MCL (ref 4–6)
SODIUM SERPL-SCNC: 126 MMOL/L (ref 136–145)
WBC # BLD AUTO: 6.22 X10(3)/MCL (ref 4–11.5)

## 2025-03-23 PROCEDURE — 25000003 PHARM REV CODE 250: Performed by: INTERNAL MEDICINE

## 2025-03-23 PROCEDURE — 21400001 HC TELEMETRY ROOM

## 2025-03-23 PROCEDURE — 25000003 PHARM REV CODE 250: Performed by: EMERGENCY MEDICINE

## 2025-03-23 PROCEDURE — 80053 COMPREHEN METABOLIC PANEL: CPT | Performed by: INTERNAL MEDICINE

## 2025-03-23 PROCEDURE — 83735 ASSAY OF MAGNESIUM: CPT | Performed by: INTERNAL MEDICINE

## 2025-03-23 PROCEDURE — 36415 COLL VENOUS BLD VENIPUNCTURE: CPT | Performed by: INTERNAL MEDICINE

## 2025-03-23 PROCEDURE — 25000003 PHARM REV CODE 250: Performed by: FAMILY MEDICINE

## 2025-03-23 PROCEDURE — 94761 N-INVAS EAR/PLS OXIMETRY MLT: CPT

## 2025-03-23 PROCEDURE — 85025 COMPLETE CBC W/AUTO DIFF WBC: CPT | Performed by: FAMILY MEDICINE

## 2025-03-23 RX ADMIN — HYDROCODONE BITARTRATE AND ACETAMINOPHEN 1 TABLET: 10; 325 TABLET ORAL at 01:03

## 2025-03-23 RX ADMIN — ATORVASTATIN CALCIUM 40 MG: 40 TABLET, FILM COATED ORAL at 08:03

## 2025-03-23 RX ADMIN — SACUBITRIL AND VALSARTAN 1 TABLET: 24; 26 TABLET, FILM COATED ORAL at 08:03

## 2025-03-23 RX ADMIN — CARVEDILOL 3.12 MG: 3.12 TABLET, FILM COATED ORAL at 08:03

## 2025-03-23 RX ADMIN — RIVAROXABAN 20 MG: 20 TABLET, FILM COATED ORAL at 04:03

## 2025-03-23 RX ADMIN — FAMOTIDINE 20 MG: 20 TABLET, FILM COATED ORAL at 08:03

## 2025-03-23 RX ADMIN — ESCITALOPRAM OXALATE 10 MG: 10 TABLET ORAL at 08:03

## 2025-03-23 RX ADMIN — ASPIRIN 81 MG: 81 TABLET ORAL at 08:03

## 2025-03-23 RX ADMIN — CALCIUM CARBONATE (ANTACID) CHEW TAB 500 MG 500 MG: 500 CHEW TAB at 08:03

## 2025-03-23 RX ADMIN — CALCIUM CARBONATE (ANTACID) CHEW TAB 500 MG 500 MG: 500 CHEW TAB at 04:03

## 2025-03-23 RX ADMIN — Medication 6 MG: at 08:03

## 2025-03-23 RX ADMIN — CALCIUM CARBONATE (ANTACID) CHEW TAB 500 MG 500 MG: 500 CHEW TAB at 12:03

## 2025-03-23 NOTE — PHYSICIAN QUERY
Please specify the diagnosis or diagnoses that correspond(s) to the indicators listed in the query message:  Hyponatremia

## 2025-03-24 LAB
ANION GAP SERPL CALC-SCNC: 6 MEQ/L (ref 2–13)
BASOPHILS # BLD AUTO: 0.01 X10(3)/MCL (ref 0.01–0.08)
BASOPHILS NFR BLD AUTO: 0.2 % (ref 0.1–1.2)
BUN SERPL-MCNC: 26 MG/DL (ref 7–20)
CALCIUM SERPL-MCNC: 8.7 MG/DL (ref 8.4–10.2)
CHLORIDE SERPL-SCNC: 92 MMOL/L (ref 98–110)
CO2 SERPL-SCNC: 27 MMOL/L (ref 21–32)
CREAT SERPL-MCNC: 1.06 MG/DL (ref 0.66–1.25)
CREAT/UREA NIT SERPL: 25 (ref 12–20)
EOSINOPHIL # BLD AUTO: 0 X10(3)/MCL (ref 0.04–0.54)
EOSINOPHIL NFR BLD AUTO: 0 % (ref 0.7–7)
ERYTHROCYTE [DISTWIDTH] IN BLOOD BY AUTOMATED COUNT: 14.7 %
GFR SERPLBLD CREATININE-BSD FMLA CKD-EPI: 76 ML/MIN/1.73/M2
GLUCOSE SERPL-MCNC: 87 MG/DL (ref 70–115)
HCT VFR BLD AUTO: 44.6 % (ref 36–52)
HGB BLD-MCNC: 15.6 G/DL (ref 13–18)
IMM GRANULOCYTES # BLD AUTO: 0.01 X10(3)/MCL (ref 0–0.03)
IMM GRANULOCYTES NFR BLD AUTO: 0.2 % (ref 0–0.5)
LYMPHOCYTES # BLD AUTO: 1.55 X10(3)/MCL (ref 1.32–3.57)
LYMPHOCYTES NFR BLD AUTO: 27.6 % (ref 20–55)
MCH RBC QN AUTO: 30.8 PG (ref 27–34)
MCHC RBC AUTO-ENTMCNC: 35 G/DL (ref 31–37)
MCV RBC AUTO: 88 FL (ref 79–99)
MONOCYTES # BLD AUTO: 0.68 X10(3)/MCL (ref 0.3–0.82)
MONOCYTES NFR BLD AUTO: 12.1 % (ref 4.7–12.5)
NEUTROPHILS # BLD AUTO: 3.37 X10(3)/MCL (ref 1.78–5.38)
NEUTROPHILS NFR BLD AUTO: 59.9 % (ref 37–73)
NRBC BLD AUTO-RTO: 0 %
PLATELET # BLD AUTO: 194 X10(3)/MCL (ref 140–371)
PMV BLD AUTO: 10 FL (ref 9.4–12.4)
POTASSIUM SERPL-SCNC: 4.4 MMOL/L (ref 3.5–5.1)
RBC # BLD AUTO: 5.07 X10(6)/MCL (ref 4–6)
SODIUM SERPL-SCNC: 125 MMOL/L (ref 136–145)
WBC # BLD AUTO: 5.62 X10(3)/MCL (ref 4–11.5)

## 2025-03-24 PROCEDURE — 25000003 PHARM REV CODE 250: Performed by: EMERGENCY MEDICINE

## 2025-03-24 PROCEDURE — 36415 COLL VENOUS BLD VENIPUNCTURE: CPT | Performed by: FAMILY MEDICINE

## 2025-03-24 PROCEDURE — 25000003 PHARM REV CODE 250: Performed by: FAMILY MEDICINE

## 2025-03-24 PROCEDURE — 80048 BASIC METABOLIC PNL TOTAL CA: CPT | Performed by: FAMILY MEDICINE

## 2025-03-24 PROCEDURE — 94761 N-INVAS EAR/PLS OXIMETRY MLT: CPT

## 2025-03-24 PROCEDURE — 21400001 HC TELEMETRY ROOM

## 2025-03-24 PROCEDURE — 25000003 PHARM REV CODE 250: Performed by: INTERNAL MEDICINE

## 2025-03-24 PROCEDURE — 85025 COMPLETE CBC W/AUTO DIFF WBC: CPT | Performed by: FAMILY MEDICINE

## 2025-03-24 RX ADMIN — CALCIUM CARBONATE (ANTACID) CHEW TAB 500 MG 500 MG: 500 CHEW TAB at 08:03

## 2025-03-24 RX ADMIN — Medication 6 MG: at 08:03

## 2025-03-24 RX ADMIN — CALCIUM CARBONATE (ANTACID) CHEW TAB 500 MG 500 MG: 500 CHEW TAB at 04:03

## 2025-03-24 RX ADMIN — SACUBITRIL AND VALSARTAN 1 TABLET: 24; 26 TABLET, FILM COATED ORAL at 08:03

## 2025-03-24 RX ADMIN — ASPIRIN 81 MG: 81 TABLET ORAL at 08:03

## 2025-03-24 RX ADMIN — RIVAROXABAN 20 MG: 20 TABLET, FILM COATED ORAL at 04:03

## 2025-03-24 RX ADMIN — ESCITALOPRAM OXALATE 10 MG: 10 TABLET ORAL at 08:03

## 2025-03-24 RX ADMIN — CARVEDILOL 3.12 MG: 3.12 TABLET, FILM COATED ORAL at 08:03

## 2025-03-24 RX ADMIN — FAMOTIDINE 20 MG: 20 TABLET, FILM COATED ORAL at 08:03

## 2025-03-24 RX ADMIN — ATORVASTATIN CALCIUM 40 MG: 40 TABLET, FILM COATED ORAL at 08:03

## 2025-03-24 RX ADMIN — CALCIUM CARBONATE (ANTACID) CHEW TAB 500 MG 500 MG: 500 CHEW TAB at 01:03

## 2025-03-24 NOTE — SUBJECTIVE & OBJECTIVE
Interval History:     Review of Systems   Constitutional:  Positive for appetite change and fatigue. Negative for activity change and fever.   Respiratory:  Positive for shortness of breath. Negative for apnea, cough, choking, chest tightness and wheezing.    Cardiovascular:  Negative for chest pain and leg swelling.   Gastrointestinal:  Negative for abdominal distention, abdominal pain, constipation, diarrhea, nausea and vomiting.   Musculoskeletal:  Negative for gait problem, joint swelling, myalgias and neck pain.   Skin:  Negative for pallor, rash and wound.   Psychiatric/Behavioral:  Negative for agitation and behavioral problems.      Objective:     Vital Signs (Most Recent):  Temp: 98.4 °F (36.9 °C) (03/24/25 1115)  Pulse: 91 (03/24/25 1115)  Resp: 18 (03/24/25 1115)  BP: (!) 130/97 (03/24/25 1115)  SpO2: 100 % (03/24/25 1115) Vital Signs (24h Range):  Temp:  [96.7 °F (35.9 °C)-98.9 °F (37.2 °C)] 98.4 °F (36.9 °C)  Pulse:  [52-92] 91  Resp:  [16-24] 18  SpO2:  [94 %-100 %] 100 %  BP: (123-134)/(93-99) 130/97     Weight: 66.8 kg (147 lb 3.2 oz)  Body mass index is 23.05 kg/m².    Intake/Output Summary (Last 24 hours) at 3/24/2025 1443  Last data filed at 3/24/2025 1237  Gross per 24 hour   Intake 360 ml   Output 100 ml   Net 260 ml         Physical Exam  Constitutional:       General: He is not in acute distress.     Appearance: Normal appearance. He is ill-appearing. He is not toxic-appearing or diaphoretic.   HENT:      Head: Normocephalic and atraumatic.      Right Ear: External ear normal.      Left Ear: External ear normal.      Nose: Nose normal. No congestion or rhinorrhea.      Mouth/Throat:      Mouth: Mucous membranes are moist.      Pharynx: Oropharynx is clear.   Eyes:      Extraocular Movements: Extraocular movements intact.      Conjunctiva/sclera: Conjunctivae normal.      Pupils: Pupils are equal, round, and reactive to light.   Neck:      Vascular: No carotid bruit.   Cardiovascular:      Rate  and Rhythm: Normal rate and regular rhythm.      Pulses: Normal pulses.      Heart sounds: Normal heart sounds. No murmur heard.  Pulmonary:      Effort: Pulmonary effort is normal. No respiratory distress.      Breath sounds: Rales present. No wheezing or rhonchi.   Abdominal:      General: Abdomen is flat. Bowel sounds are normal. There is no distension.      Palpations: Abdomen is soft.      Tenderness: There is no abdominal tenderness. There is no guarding.   Musculoskeletal:         General: No swelling or tenderness. Normal range of motion.      Cervical back: Normal range of motion and neck supple. No tenderness.      Right lower leg: No edema.      Left lower leg: No edema.   Lymphadenopathy:      Cervical: No cervical adenopathy.   Skin:     General: Skin is warm and dry.      Coloration: Skin is not jaundiced or pale.   Neurological:      General: No focal deficit present.      Mental Status: He is alert and oriented to person, place, and time. Mental status is at baseline.      Cranial Nerves: No cranial nerve deficit.      Motor: No weakness.      Coordination: Coordination normal.      Gait: Gait normal.   Psychiatric:         Mood and Affect: Mood normal.         Behavior: Behavior normal.         Thought Content: Thought content normal.         Judgment: Judgment normal.               Significant Labs: All pertinent labs within the past 24 hours have been reviewed.    Significant Imaging: I have reviewed all pertinent imaging results/findings within the past 24 hours.

## 2025-03-24 NOTE — PLAN OF CARE
03/24/25 1312   Discharge Reassessment   Assessment Type Discharge Planning Reassessment   Did the patient's condition or plan change since previous assessment? No   Discharge Plan discussed with: Patient;Adult children   Communicated CHASIDY with patient/caregiver Yes   Discharge Plan A Skilled Nursing Facility   DME Needed Upon Discharge  none   Transition of Care Barriers Does not adhere to care plan;Mobility;No family/friends to help;Nursing Home rejection;Substance Abuse;Social;Transportation   Why the patient remains in the hospital Placement issues   Post-Acute Status   Post-Acute Authorization Placement   Post-Acute Placement Status Pending payor review/awaiting authorization (if required)   Coverage [facility] Managed [facility]   Hospital Resources/Appts/Education Provided Post-Acute resouces added to AVS   Patient choice form signed by patient/caregiver List with quality metrics by geographic area provided;List from CMS Compare;List from System Post-Acute Care   Discharge Delays (!) Post-Acute Set-up

## 2025-03-24 NOTE — ASSESSMENT & PLAN NOTE
Patient's blood pressure range in the last 24 hours was: BP  Min: 123/93  Max: 134/99.The patient's inpatient anti-hypertensive regimen is listed below:  Current Antihypertensives  carvediloL tablet 3.125 mg, 2 times daily, Oral  hydrALAZINE injection 10 mg, Every 6 hours PRN, Intravenous    Plan  - BP is controlled, no changes needed to their regimen  -

## 2025-03-24 NOTE — PROGRESS NOTES
Inpatient Nutrition Assessment    Admit Date: 3/17/2025   Total duration of encounter: 7 days   Patient Age: 69 y.o.    Nutrition Recommendation/Prescription     Continue Regular Diet as medically appropriate to encourage PO intake.     Continue Boost Very High Calorie- Strawberry 1x/day providing (530 kcal, 22 gm pro) to aid in nutritional intake.      Recommend consider folate and thiamine supplementation.     Recommend consider appetite stimulant due to decreased PO intake with AMS.    Continue to encourage PO intake and honor patient preferences.    Dietitian will monitor Energy Intake, Food and Beverage Intake, Mental Status, Weight, and Weight Change and adjust MNT as needed.     Monitoring & Evaluation     Communication of Recommendations: reviewed with nurse, reviewed with patient, and reviewed with family    Reason Seen: continuous nutrition monitoring and RD Screened Moderate Risk    Nutrition Risk/Follow-Up: moderate (follow-up in 3-5 days)     Next Date to be Seen by RD: 03/28/25  Please consult if re-assessment needed sooner.      Nutrition Assessment     Malnutrition Assessment/Nutrition-Focused Physical Exam       Malnutrition Level: other (see comments) (Unable to assess) (03/24/25 1611)  Energy Intake (Malnutrition): less than or equal to 50% for greater than or equal to 5 days (03/24/25 1611)  Weight Loss (Malnutrition): other (see comments) (Does not meet criteria) (03/24/25 1611)     Orbital Region (Subcutaneous Fat Loss): other (see comments) (unable to assess)           [place] Region (Muscle Loss): other (see comments) (unable to assess)     Clavicle and Acromion Bone Region (Muscle Loss): other (see comments) (Unable to assess)                 Fluid Accumulation (Malnutrition): mild (03/24/25 1611)        A minimum of two characteristics is recommended for diagnosis of either severe or non-severe malnutrition.    Chart Review    Malnutrition Screening Tool Results   Have you recently lost  weight without trying?: No  Have you been eating poorly because of a decreased appetite?: Yes   MST Score: 1     Home Nutrition Screen Results   Home Tube Feeding: No   Home Parenteral Nutrition: No     Diagnosis:  Severe systolic CHF  Substance abuse  Primary hypertension  GERD    Past Medical History:   Diagnosis Date    Anxiety disorder, unspecified     CHF (congestive heart failure)     Depression     GERD (gastroesophageal reflux disease) 03/22/2025    Hypertension     Hyponatremia      Past Surgical History:   Procedure Laterality Date    BACK SURGERY      [facility]         Scheduled Medications:  aspirin, 81 mg, Daily  atorvastatin, 40 mg, QHS  calcium carbonate, 500 mg, QID  carvediloL, 3.125 mg, BID  EScitalopram oxalate, 10 mg, Daily  famotidine, 20 mg, BID  rivaroxaban, 20 mg, Daily with dinner  sacubitriL-valsartan, 1 tablet, BID    Continuous Infusions:   PRN Medications:   Current Facility-Administered Medications:     acetaminophen, 650 mg, Oral, Q6H PRN    hydrALAZINE, 10 mg, Intravenous, Q6H PRN    HYDROcodone-acetaminophen, 1 tablet, Oral, Q6H PRN    melatonin, 6 mg, Oral, Nightly PRN    ondansetron, 4 mg, Intravenous, Q8H PRN    sodium chloride 0.9%, 10 mL, Intravenous, PRN    Calorie Containing IV Medications: no significant kcals from medications at this time    Nutrition-Related Labs:   Recent Labs   Lab 03/18/25  0500 03/19/25  0503 03/20/25  0455 03/21/25  0738 03/22/25  0459 03/23/25  0443 03/24/25  0424   * 128* 126* 128* 127* 126* 125*   K 4.1 4.0 3.8 3.5 3.0* 3.9 4.4   CALCIUM 9.0 8.8 8.6 8.1* 7.9* 8.3* 8.7   MG  --   --   --   --  1.80 2.00  --    CO2 26 29 25 31 29 23 27   BUN 31* 35* 33* 30* 25* 24* 26*   CREATININE 1.32* 1.51* 1.27* 1.13 1.04 0.99 1.06   EGFRNORACEVR 58 50 61 70 78 82 76   GLUCOSE 90 93 76 71 77 85 87   BILITOT  --   --   --   --  0.8 1.2*  --    ALKPHOS  --   --   --   --  155* 191*  --    ALT  --   --   --   --  70* 96*  --    AST  --   --   --   --  45 72*  --     ALBUMIN  --   --   --   --  2.8* 3.4  --    WBC 5.40 4.94 5.32 5.41 5.08 6.22 5.62   HGB 15.5 15.0 15.1 15.6 14.1 15.7 15.6   HCT 44.2 42.9 43.1 43.9 40.7 45.8 44.6     CrCl:    Lab Value: 49.2    Date: 3/18  CrCl:    Lab Value: 61.5    Date: 3/24    Nutrition Orders:  Diet Low Sodium, 2gm  Dietary nutrition supplements Daily; Boost Very High Calorie Nutritional Drink - Strawberry    Appetite/Oral Intake: poor/25-50% of meals  Factors Affecting Nutritional Intake: impaired cognitive status/motor control and decreased appetite  Social Needs Impacting Access to Food: none identified  Food Insecurity: No Food Insecurity (3/21/2025)    Hunger Vital Sign     Worried About Running Out of Food in the Last Year: Never true     Ran Out of Food in the Last Year: Never true   Recent Concern: Food Insecurity - Food Insecurity Present (2024)    [facility] and Affiliates    Hunger Vital Sign     Worried About Running Out of Food in the Last Year: Sometimes true     Ran Out of Food in the Last Year: Sometimes true     Food/Episcopalian/Cultural Preferences: Food Preferences: N/a / Spiritual, Cultural Beliefs, Ep[employer]opalian Practices, Values that Affect Care: no  Food Allergies: Review of patient's allergies indicates:  No Known Allergies         Wound(s):       Overview/Hospital Course:    (3/18): Patient appears to answer questions appropriately however, nurse reports patient is totally confused. Patient reported decreased appetite now and for an unknown period of time. Per EMR with weight gain vs weight loss and unsure of any food preferences. Nurse reported patient not really eating at this time as such PO intake 0%-1 meal. Patient declined ONS at first but did report willing to try as such added 1x/day. GI: WDL except GI symptoms/LBM-3/19, : WDL except VOIDING abilities and DARK, FUNCTIONAL SCREEN:Eatin - independent, Nutrition: 2-->probably  "inadequate,Miller Score: 20, 2+ BLE EDEMA NOTED, 24 HR I/O: 360/0.     (3/24): Patient with altered mental status, nurse and sister in room report he is not eating much and not drink much of is ONS. Per EMR patient averaged 44%-13 meals meeting 48% without ONS and 80% with ONS. Patient weighed 65.9 kg on 3/17/25, CBW- 66.8 kg showing a 1.98# weight gain likely related to fluid as patient with CHF. GI: WDL and NON DISTENDED/LBM-3/23, : WDL, FUNCTIONAL SCREEN:Eatin - independent, Nutrition: 3-->adequate,Miller Score: 20, 1+ BLE EDEMA NOTED, 24 HR I/O: 2951/1765.     Anthropometrics    Height: 5' 7" (170.2 cm) Height Method: Stated  Last Weight: 66.8 kg (147 lb 3.2 oz) (25 0902) Weight Method: Standard Scale  BMI (Calculated): 23  BMI Classification: normal (BMI 18.5-24.9)        Ideal Body Weight (IBW), Male: 148 lb     % Ideal Body Weight, Male (lb): 98.11 %                          Usual Weight Provided By: EMR weight history    Wt Readings from Last 5 Encounters:   25 66.8 kg (147 lb 3.2 oz)   25 68 kg (150 lb)   25 58.5 kg (129 lb)   02/10/25 58.1 kg (128 lb 1.4 oz)   25 67.5 kg (148 lb 12.8 oz)     Weight Change(s) Since Admission:  Admit Weight: 68.9 kg (152 lb) (25 0950)      Estimated Needs    Weight Used For Calorie Calculations: 66.8 kg (147 lb 4.3 oz)  Energy Calorie Requirements (kcal): 6552-6044 KCAL (25-30 KCAL/KG CBW)  Energy Need Method: Kcal/kg  Weight Used For Protein Calculations: 66.8 kg (147 lb 4.3 oz)  Protein Requirements: 67-80 GM PRO (1.0-1.2 GM/KG CBW)  Fluid Requirements (mL): 1670 ML H2O (25 ML/KG CBW)        Enteral Nutrition    Patient not receiving enteral nutrition at this time.    Parenteral Nutrition    Patient not receiving parenteral nutrition support at this time.    Evaluation of Received Nutrient Intake    Calories: not meeting estimated needs  Protein: not meeting estimated needs    Patient Education    Not applicable.         Nutrition " Diagnosis     PES: Inadequate energy intake related to inability to consume sufficient nutrients as evidenced by estimated energy intake from all sources less than projected needs. (new)    Nutrition Interventions     Intervention(s): general/healthful diet, modified composition of meals/snacks, commercial beverage, multivitamin/mineral supplement therapy, prescription medication, and collaboration with other providers    Goal: Meet Greater than 75% of nutritional needs by discharge. (new)    Goal: Maintain weight throughout hospitalization. (new)    Nutrition Goals & Monitoring     Dietitian will monitor: food and beverage intake, energy intake, weight, weight change, and electrolyte/renal panel  Discharge planning:    continue Low Sodium diet with Boost VHC oral supplements  Next Date to be Seen by RD: 03/28/25  Please consult if re-assessment needed sooner.        Addendum d/t error with diet

## 2025-03-24 NOTE — PT/OT/SLP PROGRESS
"Physical Therapy      Patient Name:  William South   MRN:  [id number]    Patient not seen today secondary to Patient unwilling to participate. 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 Will follow-up tomorrow.    "

## 2025-03-24 NOTE — PROGRESS NOTES
[facility] Medicine  Progress Note    Patient Name: William South  MRN: [id number]  Patient Class: IP- Inpatient   Admission Date: 3/17/2025  Length of Stay: 6 days  Attending Physician: Yossi Hernández MD  Primary Care Provider: Bette Soliz NP        Subjective     Principal Problem:Severe systolic congestive heart failure        HPI:  69-year-old male with past medical history significant combined congestive heart failure, hypertension, anxiety/depression, tobacco abuse and polysubstance abuse who presented to ED with complaints of shortness for breath leg and feet swelling patient noted increased shortness for breath especially with exertion but that has progressing to when he is at rest over the last few days.  Patient stated he is having difficulty even was walking short distances with increasing shortness a breath.  He noticed swelling to his bilateral feet migraine up his legs.  Patient was recently seen at the end of February with the use prescribed Lasix Xarelto anticoagulation in initiated on call immediate therapy for congestive heart failure.  At that time patient was also diagnosed with a pulmonary embolism he was discharged in his medication instructed to be compliant but patient stated he is not being compliant with medications he got confused with the administration.  During initial evaluation ED he was found to have urine drug screen that was positive for cocaine.  Denies any fever or chills nausea or vomiting.          Past Medical History:   Diagnosis Date    Anxiety disorder, unspecified      CHF (congestive heart failure)      Depression      Hypertension      Hyponatremia        Overview/Hospital Course:  3/19/2025  Sob much better today   Case mgmt working on snf     3/20/2025  No new c/o  Work with PT  Sob better     Today: Patient seen and examined at bedside, and chart reviewed.   03/21/2025 patient resting comfortably awaiting placement.  He denies  any shortness of breath currently.  We will continue his current medications labs look good we will repeat some in the morning.  03/22/2025 patient doing well waiting for placement.  He complains of a little bit of acid indigestion requests some Tums.  He states his bowels are moving normally.  Labs look good repeat some labs tomorrow and prescribe him some Tums.  03/24/2025 pt awake and alert confused, agreed to [place] detention does not seem to be able to live independentlky, does not take his medicine and does not go to his appoitments.    Interval History:     Review of Systems   Constitutional:  Positive for appetite change and fatigue. Negative for activity change and fever.   Respiratory:  Positive for shortness of breath. Negative for apnea, cough, choking, chest tightness and wheezing.    Cardiovascular:  Negative for chest pain and leg swelling.   Gastrointestinal:  Negative for abdominal distention, abdominal pain, constipation, diarrhea, nausea and vomiting.   Musculoskeletal:  Negative for gait problem, joint swelling, myalgias and neck pain.   Skin:  Negative for pallor, rash and wound.   Psychiatric/Behavioral:  Negative for agitation and behavioral problems.      Objective:     Vital Signs (Most Recent):  Temp: 98.4 °F (36.9 °C) (03/24/25 1115)  Pulse: 91 (03/24/25 1115)  Resp: 18 (03/24/25 1115)  BP: (!) 130/97 (03/24/25 1115)  SpO2: 100 % (03/24/25 1115) Vital Signs (24h Range):  Temp:  [96.7 °F (35.9 °C)-98.9 °F (37.2 °C)] 98.4 °F (36.9 °C)  Pulse:  [52-92] 91  Resp:  [16-24] 18  SpO2:  [94 %-100 %] 100 %  BP: (123-134)/(93-99) 130/97     Weight: 66.8 kg (147 lb 3.2 oz)  Body mass index is 23.05 kg/m².    Intake/Output Summary (Last 24 hours) at 3/24/2025 1443  Last data filed at 3/24/2025 1237  Gross per 24 hour   Intake 360 ml   Output 100 ml   Net 260 ml         Physical Exam  Constitutional:       General: He is not in acute distress.     Appearance: Normal appearance. He is ill-appearing. He  is not toxic-appearing or diaphoretic.   HENT:      Head: Normocephalic and atraumatic.      Right Ear: External ear normal.      Left Ear: External ear normal.      Nose: Nose normal. No congestion or rhinorrhea.      Mouth/Throat:      Mouth: Mucous membranes are moist.      Pharynx: Oropharynx is clear.   Eyes:      Extraocular Movements: Extraocular movements intact.      Conjunctiva/sclera: Conjunctivae normal.      Pupils: Pupils are equal, round, and reactive to light.   Neck:      Vascular: No carotid bruit.   Cardiovascular:      Rate and Rhythm: Normal rate and regular rhythm.      Pulses: Normal pulses.      Heart sounds: Normal heart sounds. No murmur heard.  Pulmonary:      Effort: Pulmonary effort is normal. No respiratory distress.      Breath sounds: Rales present. No wheezing or rhonchi.   Abdominal:      General: Abdomen is flat. Bowel sounds are normal. There is no distension.      Palpations: Abdomen is soft.      Tenderness: There is no abdominal tenderness. There is no guarding.   Musculoskeletal:         General: No swelling or tenderness. Normal range of motion.      Cervical back: Normal range of motion and neck supple. No tenderness.      Right lower leg: No edema.      Left lower leg: No edema.   Lymphadenopathy:      Cervical: No cervical adenopathy.   Skin:     General: Skin is warm and dry.      Coloration: Skin is not jaundiced or pale.   Neurological:      General: No focal deficit present.      Mental Status: He is alert and oriented to person, place, and time. Mental status is at baseline.      Cranial Nerves: No cranial nerve deficit.      Motor: No weakness.      Coordination: Coordination normal.      Gait: Gait normal.   Psychiatric:         Mood and Affect: Mood normal.         Behavior: Behavior normal.         Thought Content: Thought content normal.         Judgment: Judgment normal.               Significant Labs: All pertinent labs within the past 24 hours have been  "reviewed.    Significant Imaging: I have reviewed all pertinent imaging results/findings within the past 24 hours.      Assessment & Plan  Severe systolic congestive heart failure  Patient has Systolic (HFrEF) heart failure that is Acute on chronic. On presentation their CHF was decompensated. Evidence of decompensated CHF on presentation includes: shortness of breath. The etiology of their decompensation is likely dietary indiscretion. Most recent BNP and echo results are listed below.  No results for input(s): "BNP" in the last 72 hours.  Latest ECHO  Results for orders placed during the hospital encounter of 02/26/25    Echo    Interpretation Summary    Left Ventricle: There is severely reduced systolic function with a visually estimated ejection fraction of 5 - 10%. Grade III diastolic dysfunction.    Right Ventricle: Systolic function is mildly reduced.    Aortic Valve: There is mild (1+) aortic regurgitation.    Mitral Valve: There is moderate to severe (3+) regurgitation.    Tricuspid Valve: There is moderate to severe (3+) regurgitation.    Pulmonic Valve: There is mild (1+) regurgitation.    Current Heart Failure Medications  carvediloL tablet 3.125 mg, 2 times daily, Oral  sacubitriL-valsartan 24-26 mg per tablet 1 tablet, 2 times daily, Oral  hydrALAZINE injection 10 mg, Every 6 hours PRN, Intravenous    Plan  - Monitor strict I&Os and daily weights.    - Place on telemetry  - Low sodium diet  - Place on fluid restriction of 1.5 L.   - Cardiology has been consulted  - The patient's volume status is improving but not at their baseline as indicated by shortness of breath  -       Substance abuse  Patient urged to stop using drugs    Primary hypertension  Patient's blood pressure range in the last 24 hours was: BP  Min: 123/93  Max: 134/99.The patient's inpatient anti-hypertensive regimen is listed below:  Current Antihypertensives  carvediloL tablet 3.125 mg, 2 times daily, Oral  hydrALAZINE injection 10 " mg, Every 6 hours PRN, Intravenous    Plan  - BP is controlled, no changes needed to their regimen  -   GERD (gastroesophageal reflux disease)  Tums PRN    VTE Risk Mitigation (From admission, onward)           Ordered     rivaroxaban tablet 20 mg  With dinner         03/17/25 1716                    Discharge Planning   CHASIDY: 3/25/2025     Code Status: Full Code   Medical Readiness for Discharge Date:   Discharge Plan A: Skilled Nursing Facility   Discharge Delays: (!) Post-Acute Set-up                    Racquel Soares MD  Department of Hospital Medicine   [facility]/Surg

## 2025-03-24 NOTE — PROGRESS NOTES
Hospital Medicine  Progress Note    Patient Name: William South  MRN: [id number]  Status: IP- Inpatient   Admission Date: 3/17/2025  Length of Stay: 6  Date of Service: 03/23/2025       CC: hospital follow-up for        SUBJECTIVE   69-year-old male with past medical history significant combined congestive heart failure, hypertension, anxiety/depression, tobacco abuse and polysubstance abuse who presented to ED with complaints of shortness for breath leg and feet swelling patient noted increased shortness for breath especially with exertion but that has progressing to when he is at rest over the last few days.  Patient stated he is having difficulty even was walking short distances with increasing shortness a breath.  He noticed swelling to his bilateral feet migraine up his legs.  Patient was recently seen at the end of February with the use prescribed Lasix Xarelto anticoagulation in initiated on call immediate therapy for congestive heart failure.  At that time patient was also diagnosed with a pulmonary embolism he was discharged in his medication instructed to be compliant but patient stated he is not being compliant with medications he got confused with the administration.  During initial evaluation ED he was found to have urine drug screen that was positive for cocaine.  Denies any fever or chills nausea or vomiting.     3/23/2025  Stable overnight   Awaiting placement     Today: Patient seen and examined at bedside, and chart reviewed.       MEDICATIONS   Scheduled   aspirin  81 mg Oral Daily    atorvastatin  40 mg Oral QHS    calcium carbonate  500 mg Oral QID    carvediloL  3.125 mg Oral BID    EScitalopram oxalate  10 mg Oral Daily    famotidine  20 mg Oral BID    rivaroxaban  20 mg Oral Daily with dinner    sacubitriL-valsartan  1 tablet Oral BID     Continuous Infusions        PHYSICAL EXAM   VITALS: T 97.4 °F (36.3 °C)   BP (!) 134/99   P 92   RR 16   O2 96 %    GENERAL: Awake and in NAD  LUNGS: CTA  B/L  CVS: Normal rate  GI/: Soft, NT, bowel sounds positive.  EXTREMITIES: No peripheral edema  NEURO: AAOx3  PSYCH: Cooperative      LABS   CBC  Recent Labs     03/23/25 0443 03/24/25 0424   WBC 6.22 5.62   RBC 5.08 5.07   HGB 15.7 15.6   HCT 45.8 44.6   MCV 90.2 88.0   MCH 30.9 30.8   MCHC 34.3 35.0   RDW 14.7 14.7    194     CHEM  Recent Labs     03/22/25 0459 03/23/25 0443 03/24/25 0424   * 126* 125*   K 3.0* 3.9 4.4   CO2 29 23 27   BUN 25* 24* 26*   CREATININE 1.04 0.99 1.06   GLUCOSE 77 85 87   CALCIUM 7.9* 8.3* 8.7   MG 1.80 2.00  --    ALBUMIN 2.8* 3.4  --    GLOBULIN 2.6 2.6  --    ALKPHOS 155* 191*  --    ALT 70* 96*  --    AST 45 72*  --    BILITOT 0.8 1.2*  --          MICROBIOLOGY     Microbiology Results (last 7 days)       Procedure Component Value Units Date/Time    Urine culture [[id number]] Collected: 03/17/25 1139    Order Status: Completed Specimen: Urine Updated: 03/19/25 0937     Urine Culture No Significant Growth              DIAGNOSTICS   CTA Chest Non-Coronary (PE Studies)  Addendum: There are typographical errors in #5 in the IMPRESSION.  This sentence  should read:     5.  Similar posterior dependent layering right pleural effusion with  marginating alveolar opacities.     Electronically signed by: Wiley Pepe MD   Date:    03/17/2025   Time:    14:33  Narrative: CT ANGIOGRAM OF THE CHEST WITH AND WITHOUT CONTRAST AND WITH 3D POSTPROCESSING:    CPT: 49859, 60480    Total DLP: 347.0 mGy-cm. Automatic exposure control was utilized to limit the radiation dose to the patient.  Total contrast: 96 mL in unspecified peripheral vein.    History:  Worsening shortness of breath with pulmonary thromboemboli on comparison exam from 02/26/2025.    Comparison exam: As above.    Technique: Multiple thin cut axial images were acquired a through the chest before and during intravenous contrast administration.  The source images were viewed at the workstation. MIP and volume  rendering was performed on the source images with 3-D reconstructions. The angiographic reconstructions were reviewed by the radiologist.    Findings:  This study is significantly limited due to poor contrast bolus timing with maximum opacification of the left innominate and subclavian veins, left innominate vein, SVC, and right atrium, which causes significant streak artifact.  There is poor mixing of contrast in the right greater left pulmonary arteries.  As on the prior exam, there are soft tissue density foci occluding segmental and subsegmental pulmonary arteries to the right lower lobe and questionably to the right middle and upper lobes and multiple peripheral subsegmental pulmonary arteries on the left due to the artifact from poor contrast bolus timing and other factors.  There is no evidence for right heart strain.  There is cardiomegaly.  The thoracic aorta is normal in caliber, but there is no contrast opacification to a be able to evaluate for possible dissection.  No obvious mediastinal or hilar adenopathy is identified, but evaluation for this is limited due to poor contrast bolus timing.  The thyroid gland is obscured in the thoracic inlet.  There is a similar sized posterior dependent right pleural effusion.  No left effusion or pneumothorax is identified.  There are emphysematous changes in both lungs more prominent in the apices.  There are patchy alveolar opacities in the right greater left lung bases.  Impression: 1. This study is significantly limited due to poor contrast bolus timing, as above, and there is poor mixing of contrast in the right greater left pulmonary arteries.  2. As on the prior exam, there are occlusive pulmonary thromboemboli in segmental and subsegmental pulmonary arteries to the right lower lobe and questionably to the right middle and upper lobes and multiple peripheral left subsegmental pulmonary arteries  versus artifact.  There is no evidence for right heart  "strain.  3. On the prior exam, there was nonocclusive thrombus in the distal right main pulmonary artery.  On today's exam, this appears more as poor mixing of contrast without definite thrombus.  4. Cardiomegaly.  5. Similar posterior dependent layering right pleural effusion with marginating there are opacities.    Electronically signed by: Wiley Pepe MD  Date:    03/17/2025  Time:    12:47  X-Ray Chest AP Portable  Narrative: EXAMINATION:  XR CHEST AP PORTABLE    CLINICAL HISTORY:  Chest pain and shortness of breath with history of pulmonary thromboemboli.    TECHNIQUE:  Single frontal view of the chest was performed.    COMPARISON:  02/26/2025.    FINDINGS:  There is a similar right posterior dependent lying pleural effusion with some extension in the right lung base and costophrenic angle and fissures.  Alveolar opacities marginate the effusion.  There is interval decrease in previously seen alveolar opacities in the left lung base.  No left effusion or pneumothorax is present.  There is similar cardiomegaly and prominent main pulmonary vessels.  Impression: 1. Similar right pleural effusion with marginating alveolar opacities.  2. Alveolar opacities in left lung base have decreased.  3. Similar cardiomegaly and prominent central pulmonary vessels.    Electronically signed by: Wiley Pepe MD  Date:    03/17/2025  Time:    14:31    Assessment & Plan  Severe systolic congestive heart failure  Patient has Systolic (HFrEF) heart failure that is Acute on chronic. On presentation their CHF was decompensated. Evidence of decompensated CHF on presentation includes: shortness of breath. The etiology of their decompensation is likely dietary indiscretion. Most recent BNP and echo results are listed below.  No results for input(s): "BNP" in the last 72 hours.  Latest ECHO  Results for orders placed during the hospital encounter of 02/26/25     Echo     Interpretation Summary    Left Ventricle: There is severely " reduced systolic function with a visually estimated ejection fraction of 5 - 10%. Grade III diastolic dysfunction.    Right Ventricle: Systolic function is mildly reduced.    Aortic Valve: There is mild (1+) aortic regurgitation.    Mitral Valve: There is moderate to severe (3+) regurgitation.    Tricuspid Valve: There is moderate to severe (3+) regurgitation.    Pulmonic Valve: There is mild (1+) regurgitation.     Current Heart Failure Medications  carvediloL tablet 3.125 mg, 2 times daily, Oral  sacubitriL-valsartan 24-26 mg per tablet 1 tablet, 2 times daily, Oral  hydrALAZINE injection 10 mg, Every 6 hours PRN, Intravenous     Plan  - Monitor strict I&Os and daily weights.    - Place on telemetry  - Low sodium diet  - Place on fluid restriction of 1.5 L.   - Cardiology has been consulted  - The patient's volume status is improving but not at their baseline as indicated by shortness of breath  -         Substance abuse  Patient urged to stop using drugs     Primary hypertension  Patient's blood pressure range in the last 24 hours was: BP  Min: 95/70  Max: 115/86.The patient's inpatient anti-hypertensive regimen is listed below:  Current Antihypertensives  carvediloL tablet 3.125 mg, 2 times daily, Oral  hydrALAZINE injection 10 mg, Every 6 hours PRN, Intravenous     Plan  - BP is controlled, no changes needed to their regimen  -   GERD (gastroesophageal reflux disease)  Tums PRN              Yossi Hernández MD  [facility] Medicine

## 2025-03-25 LAB
ANION GAP SERPL CALC-SCNC: 6 MEQ/L (ref 2–13)
BASOPHILS # BLD AUTO: 0.01 X10(3)/MCL (ref 0.01–0.08)
BASOPHILS NFR BLD AUTO: 0.2 % (ref 0.1–1.2)
BUN SERPL-MCNC: 29 MG/DL (ref 7–20)
CALCIUM SERPL-MCNC: 8.5 MG/DL (ref 8.4–10.2)
CHLORIDE SERPL-SCNC: 93 MMOL/L (ref 98–110)
CO2 SERPL-SCNC: 25 MMOL/L (ref 21–32)
CREAT SERPL-MCNC: 1.12 MG/DL (ref 0.66–1.25)
CREAT/UREA NIT SERPL: 26 (ref 12–20)
EOSINOPHIL # BLD AUTO: 0.01 X10(3)/MCL (ref 0.04–0.54)
EOSINOPHIL NFR BLD AUTO: 0.2 % (ref 0.7–7)
ERYTHROCYTE [DISTWIDTH] IN BLOOD BY AUTOMATED COUNT: 15 %
GFR SERPLBLD CREATININE-BSD FMLA CKD-EPI: 71 ML/MIN/1.73/M2
GLUCOSE SERPL-MCNC: 95 MG/DL (ref 70–115)
HCT VFR BLD AUTO: 44.6 % (ref 36–52)
HGB BLD-MCNC: 15 G/DL (ref 13–18)
IMM GRANULOCYTES # BLD AUTO: 0.01 X10(3)/MCL (ref 0–0.03)
IMM GRANULOCYTES NFR BLD AUTO: 0.2 % (ref 0–0.5)
LYMPHOCYTES # BLD AUTO: 1.36 X10(3)/MCL (ref 1.32–3.57)
LYMPHOCYTES NFR BLD AUTO: 21.4 % (ref 20–55)
MCH RBC QN AUTO: 30.1 PG (ref 27–34)
MCHC RBC AUTO-ENTMCNC: 33.6 G/DL (ref 31–37)
MCV RBC AUTO: 89.6 FL (ref 79–99)
MONOCYTES # BLD AUTO: 0.69 X10(3)/MCL (ref 0.3–0.82)
MONOCYTES NFR BLD AUTO: 10.8 % (ref 4.7–12.5)
NEUTROPHILS # BLD AUTO: 4.28 X10(3)/MCL (ref 1.78–5.38)
NEUTROPHILS NFR BLD AUTO: 67.2 % (ref 37–73)
PLATELET # BLD AUTO: 192 X10(3)/MCL (ref 140–371)
PMV BLD AUTO: 10.7 FL (ref 9.4–12.4)
POTASSIUM SERPL-SCNC: 4.2 MMOL/L (ref 3.5–5.1)
RBC # BLD AUTO: 4.98 X10(6)/MCL (ref 4–6)
SODIUM SERPL-SCNC: 124 MMOL/L (ref 136–145)
WBC # BLD AUTO: 6.36 X10(3)/MCL (ref 4–11.5)

## 2025-03-25 PROCEDURE — 25000003 PHARM REV CODE 250: Performed by: EMERGENCY MEDICINE

## 2025-03-25 PROCEDURE — 25000003 PHARM REV CODE 250: Performed by: FAMILY MEDICINE

## 2025-03-25 PROCEDURE — 25000003 PHARM REV CODE 250: Performed by: INTERNAL MEDICINE

## 2025-03-25 PROCEDURE — 36415 COLL VENOUS BLD VENIPUNCTURE: CPT | Performed by: FAMILY MEDICINE

## 2025-03-25 PROCEDURE — 21400001 HC TELEMETRY ROOM

## 2025-03-25 PROCEDURE — 80048 BASIC METABOLIC PNL TOTAL CA: CPT | Performed by: FAMILY MEDICINE

## 2025-03-25 PROCEDURE — 85025 COMPLETE CBC W/AUTO DIFF WBC: CPT | Performed by: FAMILY MEDICINE

## 2025-03-25 PROCEDURE — 97116 GAIT TRAINING THERAPY: CPT

## 2025-03-25 PROCEDURE — 94761 N-INVAS EAR/PLS OXIMETRY MLT: CPT

## 2025-03-25 RX ADMIN — ASPIRIN 81 MG: 81 TABLET ORAL at 08:03

## 2025-03-25 RX ADMIN — CALCIUM CARBONATE (ANTACID) CHEW TAB 500 MG 500 MG: 500 CHEW TAB at 01:03

## 2025-03-25 RX ADMIN — SACUBITRIL AND VALSARTAN 1 TABLET: 24; 26 TABLET, FILM COATED ORAL at 08:03

## 2025-03-25 RX ADMIN — ESCITALOPRAM OXALATE 10 MG: 10 TABLET ORAL at 08:03

## 2025-03-25 RX ADMIN — ACETAMINOPHEN 650 MG: 325 TABLET ORAL at 10:03

## 2025-03-25 RX ADMIN — CARVEDILOL 3.12 MG: 3.12 TABLET, FILM COATED ORAL at 08:03

## 2025-03-25 RX ADMIN — CALCIUM CARBONATE (ANTACID) CHEW TAB 500 MG 500 MG: 500 CHEW TAB at 08:03

## 2025-03-25 RX ADMIN — FAMOTIDINE 20 MG: 20 TABLET, FILM COATED ORAL at 08:03

## 2025-03-25 RX ADMIN — RIVAROXABAN 20 MG: 20 TABLET, FILM COATED ORAL at 04:03

## 2025-03-25 RX ADMIN — CALCIUM CARBONATE (ANTACID) CHEW TAB 500 MG 500 MG: 500 CHEW TAB at 04:03

## 2025-03-25 RX ADMIN — Medication 6 MG: at 08:03

## 2025-03-25 RX ADMIN — ATORVASTATIN CALCIUM 40 MG: 40 TABLET, FILM COATED ORAL at 08:03

## 2025-03-25 NOTE — PROGRESS NOTES
[facility] Medicine  Progress Note    Patient Name: William South  MRN: [id number]  Patient Class: IP- Inpatient   Admission Date: 3/17/2025  Length of Stay: 7 days  Attending Physician: Yossi Hernández MD  Primary Care Provider: Bette Soliz NP        Subjective     Principal Problem:Severe systolic congestive heart failure        HPI:  69-year-old male with past medical history significant combined congestive heart failure, hypertension, anxiety/depression, tobacco abuse and polysubstance abuse who presented to ED with complaints of shortness for breath leg and feet swelling patient noted increased shortness for breath especially with exertion but that has progressing to when he is at rest over the last few days.  Patient stated he is having difficulty even was walking short distances with increasing shortness a breath.  He noticed swelling to his bilateral feet migraine up his legs.  Patient was recently seen at the end of February with the use prescribed Lasix Xarelto anticoagulation in initiated on call immediate therapy for congestive heart failure.  At that time patient was also diagnosed with a pulmonary embolism he was discharged in his medication instructed to be compliant but patient stated he is not being compliant with medications he got confused with the administration.  During initial evaluation ED he was found to have urine drug screen that was positive for cocaine.  Denies any fever or chills nausea or vomiting.          Past Medical History:   Diagnosis Date    Anxiety disorder, unspecified      CHF (congestive heart failure)      Depression      Hypertension      Hyponatremia        Overview/Hospital Course:  3/19/2025  Sob much better today   Case mgmt working on snf     3/20/2025  No new c/o  Work with PT  Sob better     Today: Patient seen and examined at bedside, and chart reviewed.   03/21/2025 patient resting comfortably awaiting placement.  He denies  any shortness of breath currently.  We will continue his current medications labs look good we will repeat some in the morning.  03/22/2025 patient doing well waiting for placement.  He complains of a little bit of acid indigestion requests some Tums.  He states his bowels are moving normally.  Labs look good repeat some labs tomorrow and prescribe him some Tums.  03/24/2025 pt awake and alert confused, agreed to [facility] does not seem to be able to live independentlky, does not take his medicine and does not go to his appoitments.  03/25/2025 awiating insurance and state approval for placement    Interval History:     Review of Systems   Constitutional:  Negative for activity change, appetite change, fatigue and fever.   Respiratory:  Negative for apnea, cough, choking, chest tightness, shortness of breath and wheezing.    Cardiovascular:  Negative for chest pain and leg swelling.   Gastrointestinal:  Negative for abdominal distention, abdominal pain, constipation, diarrhea, nausea and vomiting.   Musculoskeletal:  Negative for gait problem, joint swelling, myalgias and neck pain.   Skin:  Negative for pallor, rash and wound.   Psychiatric/Behavioral:  Negative for agitation and behavioral problems.      Objective:     Vital Signs (Most Recent):  Temp: 96.1 °F (35.6 °C) (03/25/25 1105)  Pulse: 77 (03/25/25 1105)  Resp: (!) 22 (03/25/25 1105)  BP: (!) 126/94 (03/25/25 1105)  SpO2: 99 % (03/25/25 1105) Vital Signs (24h Range):  Temp:  [96.1 °F (35.6 °C)-98 °F (36.7 °C)] 96.1 °F (35.6 °C)  Pulse:  [70-96] 77  Resp:  [18-22] 22  SpO2:  [92 %-100 %] 99 %  BP: (110-126)/(82-94) 126/94     Weight: 66.8 kg (147 lb 3.2 oz)  Body mass index is 23.05 kg/m².    Intake/Output Summary (Last 24 hours) at 3/25/2025 1355  Last data filed at 3/25/2025 0933  Gross per 24 hour   Intake 240 ml   Output --   Net 240 ml         Physical Exam  Constitutional:       General: He is not in acute distress.     Appearance: Normal  appearance. He is ill-appearing. He is not toxic-appearing or diaphoretic.   HENT:      Head: Normocephalic and atraumatic.      Right Ear: External ear normal.      Left Ear: External ear normal.      Nose: Nose normal. No congestion or rhinorrhea.      Mouth/Throat:      Mouth: Mucous membranes are moist.      Pharynx: Oropharynx is clear.   Eyes:      Extraocular Movements: Extraocular movements intact.      Conjunctiva/sclera: Conjunctivae normal.      Pupils: Pupils are equal, round, and reactive to light.   Neck:      Vascular: No carotid bruit.   Cardiovascular:      Rate and Rhythm: Normal rate and regular rhythm.      Pulses: Normal pulses.      Heart sounds: Normal heart sounds. No murmur heard.  Pulmonary:      Effort: Pulmonary effort is normal. No respiratory distress.      Breath sounds: Rales present. No wheezing or rhonchi.   Abdominal:      General: Abdomen is flat. Bowel sounds are normal. There is no distension.      Palpations: Abdomen is soft.      Tenderness: There is no abdominal tenderness. There is no guarding.   Musculoskeletal:         General: No swelling or tenderness. Normal range of motion.      Cervical back: Normal range of motion and neck supple. No tenderness.      Right lower leg: No edema.      Left lower leg: No edema.   Lymphadenopathy:      Cervical: No cervical adenopathy.   Skin:     General: Skin is warm and dry.      Coloration: Skin is not jaundiced or pale.   Neurological:      General: No focal deficit present.      Mental Status: He is alert and oriented to person, place, and time. Mental status is at baseline.      Cranial Nerves: No cranial nerve deficit.      Motor: No weakness.      Coordination: Coordination normal.      Gait: Gait normal.   Psychiatric:         Mood and Affect: Mood normal.         Behavior: Behavior normal.         Thought Content: Thought content normal.         Judgment: Judgment normal.               Significant Labs: All pertinent labs  "within the past 24 hours have been reviewed.    Significant Imaging: I have reviewed all pertinent imaging results/findings within the past 24 hours.      Assessment & Plan  Severe systolic congestive heart failure  Patient has Systolic (HFrEF) heart failure that is Acute on chronic. On presentation their CHF was decompensated. Evidence of decompensated CHF on presentation includes: shortness of breath. The etiology of their decompensation is likely dietary indiscretion. Most recent BNP and echo results are listed below.  No results for input(s): "BNP" in the last 72 hours.  Latest ECHO  Results for orders placed during the hospital encounter of 02/26/25    Echo    Interpretation Summary    Left Ventricle: There is severely reduced systolic function with a visually estimated ejection fraction of 5 - 10%. Grade III diastolic dysfunction.    Right Ventricle: Systolic function is mildly reduced.    Aortic Valve: There is mild (1+) aortic regurgitation.    Mitral Valve: There is moderate to severe (3+) regurgitation.    Tricuspid Valve: There is moderate to severe (3+) regurgitation.    Pulmonic Valve: There is mild (1+) regurgitation.    Current Heart Failure Medications  carvediloL tablet 3.125 mg, 2 times daily, Oral  sacubitriL-valsartan 24-26 mg per tablet 1 tablet, 2 times daily, Oral  hydrALAZINE injection 10 mg, Every 6 hours PRN, Intravenous    Plan  - Monitor strict I&Os and daily weights.    - Place on telemetry  - Low sodium diet  - Place on fluid restriction of 1.5 L.   - Cardiology has been consulted  - The patient's volume status is improving but not at their baseline as indicated by shortness of breath  -       Substance abuse  Patient urged to stop using drugs    Primary hypertension  Patient's blood pressure range in the last 24 hours was: BP  Min: 110/82  Max: 126/94.The patient's inpatient anti-hypertensive regimen is listed below:  Current Antihypertensives  carvediloL tablet 3.125 mg, 2 times " daily, Oral  hydrALAZINE injection 10 mg, Every 6 hours PRN, Intravenous    Plan  - BP is controlled, no changes needed to their regimen  -   GERD (gastroesophageal reflux disease)  Tums PRN    VTE Risk Mitigation (From admission, onward)           Ordered     rivaroxaban tablet 20 mg  With dinner         03/17/25 1716                    Discharge Planning   CHASIDY: 3/25/2025     Code Status: Full Code   Medical Readiness for Discharge Date:   Discharge Plan A: Skilled Nursing Facility   Discharge Delays: (!) Post-Acute Set-up                    Racquel Soares MD  Department of Hospital Medicine   [facility]

## 2025-03-25 NOTE — PT/OT/SLP PROGRESS
Physical Therapy Treatment    Patient Name:  William South   MRN:  [id number]    Recommendations:     Discharge Recommendations: Moderate Intensity Therapy  Discharge Equipment Recommendations: to be determined by next level of care  Barriers to discharge:  current medical status    Assessment:     William South is a 69 y.o. male admitted with a medical diagnosis of Severe systolic congestive heart failure.  He presents with the following impairments/functional limitations: impaired endurance, impaired functional mobility, impaired balance, impaired cardiopulmonary response to activity     Patient found with HOB elevated. Patient agreeable to PT tx. Patient performed supine to sit with SBA. Patient then performed sit to stand with SBA with RW. Gait training x 200 feet in hallway with RW with CGA with one standing rest break at about 150 feet due to shortness of breath. Patient required a standing break of about 1-2 minutes to recover. Patient then returned to room and requested to sit back down and returned to upright chair and left with all needs met and chair alarm on. Nurse notified.    Rehab Prognosis: Fair; patient would benefit from acute skilled PT services to address these deficits and reach maximum level of function.    Recent Surgery: * No surgery found *      Plan:     During this hospitalization, patient to be seen 5 x/week (5-6x weekly/1-2x daily) to address the identified rehab impairments via gait training, therapeutic activities, therapeutic exercises and progress toward the following goals:    Plan of Care Expires:  04/18/25    Subjective     Chief Complaint: none verbalized today  Patient/Family Comments/goals: to get better  Pain/Comfort:         Objective:     Communicated with nursing prior to session.  Patient found HOB elevated with peripheral IV, bed alarm upon PT entry to room.     General Precautions: Standard, fall  Orthopedic Precautions: N/A  Braces: N/A  Respiratory Status: Room air      Functional Mobility:  Bed Mobility:     Supine to Sit: stand by assistance  Transfers:     Sit to Stand:  stand by assistance with no AD  Gait: 200 feet with RW, SBA      AM-PAC 6 CLICK MOBILITY          Treatment & Education:  See above    Patient left up in chair with all lines intact, call button in reach, chair alarm on, and nurse notified..    GOALS:   Multidisciplinary Problems       Physical Therapy Goals          Problem: Physical Therapy    Goal Priority Disciplines Outcome Interventions   Physical Therapy Goal     PT, PT/OT Progressing    Description: Goals to be met by: discharge     Patient will increase functional independence with mobility by performin. Supine to sit with Modified [place]  2. Sit to stand transfer with Modified [place]  3. Gait  x 150 feet with Modified [place] using Rolling Walker/no AD.                          DME Justifications:  No DME recommended requiring DME justifications    Time Tracking:     PT Received On: 25  PT Start Time: 1345     PT Stop Time: 1400  PT Total Time (min): 15 min     Billable Minutes: Gait Training 15    Treatment Type: Treatment  PT/PTA: PT           2025

## 2025-03-25 NOTE — SUBJECTIVE & OBJECTIVE
Interval History:     Review of Systems   Constitutional:  Negative for activity change, appetite change, fatigue and fever.   Respiratory:  Negative for apnea, cough, choking, chest tightness, shortness of breath and wheezing.    Cardiovascular:  Negative for chest pain and leg swelling.   Gastrointestinal:  Negative for abdominal distention, abdominal pain, constipation, diarrhea, nausea and vomiting.   Musculoskeletal:  Negative for gait problem, joint swelling, myalgias and neck pain.   Skin:  Negative for pallor, rash and wound.   Psychiatric/Behavioral:  Negative for agitation and behavioral problems.      Objective:     Vital Signs (Most Recent):  Temp: 96.1 °F (35.6 °C) (03/25/25 1105)  Pulse: 77 (03/25/25 1105)  Resp: (!) 22 (03/25/25 1105)  BP: (!) 126/94 (03/25/25 1105)  SpO2: 99 % (03/25/25 1105) Vital Signs (24h Range):  Temp:  [96.1 °F (35.6 °C)-98 °F (36.7 °C)] 96.1 °F (35.6 °C)  Pulse:  [70-96] 77  Resp:  [18-22] 22  SpO2:  [92 %-100 %] 99 %  BP: (110-126)/(82-94) 126/94     Weight: 66.8 kg (147 lb 3.2 oz)  Body mass index is 23.05 kg/m².    Intake/Output Summary (Last 24 hours) at 3/25/2025 1355  Last data filed at 3/25/2025 0933  Gross per 24 hour   Intake 240 ml   Output --   Net 240 ml         Physical Exam  Constitutional:       General: He is not in acute distress.     Appearance: Normal appearance. He is ill-appearing. He is not toxic-appearing or diaphoretic.   HENT:      Head: Normocephalic and atraumatic.      Right Ear: External ear normal.      Left Ear: External ear normal.      Nose: Nose normal. No congestion or rhinorrhea.      Mouth/Throat:      Mouth: Mucous membranes are moist.      Pharynx: Oropharynx is clear.   Eyes:      Extraocular Movements: Extraocular movements intact.      Conjunctiva/sclera: Conjunctivae normal.      Pupils: Pupils are equal, round, and reactive to light.   Neck:      Vascular: No carotid bruit.   Cardiovascular:      Rate and Rhythm: Normal rate and  regular rhythm.      Pulses: Normal pulses.      Heart sounds: Normal heart sounds. No murmur heard.  Pulmonary:      Effort: Pulmonary effort is normal. No respiratory distress.      Breath sounds: Rales present. No wheezing or rhonchi.   Abdominal:      General: Abdomen is flat. Bowel sounds are normal. There is no distension.      Palpations: Abdomen is soft.      Tenderness: There is no abdominal tenderness. There is no guarding.   Musculoskeletal:         General: No swelling or tenderness. Normal range of motion.      Cervical back: Normal range of motion and neck supple. No tenderness.      Right lower leg: No edema.      Left lower leg: No edema.   Lymphadenopathy:      Cervical: No cervical adenopathy.   Skin:     General: Skin is warm and dry.      Coloration: Skin is not jaundiced or pale.   Neurological:      General: No focal deficit present.      Mental Status: He is alert and oriented to person, place, and time. Mental status is at baseline.      Cranial Nerves: No cranial nerve deficit.      Motor: No weakness.      Coordination: Coordination normal.      Gait: Gait normal.   Psychiatric:         Mood and Affect: Mood normal.         Behavior: Behavior normal.         Thought Content: Thought content normal.         Judgment: Judgment normal.               Significant Labs: All pertinent labs within the past 24 hours have been reviewed.    Significant Imaging: I have reviewed all pertinent imaging results/findings within the past 24 hours.

## 2025-03-25 NOTE — ASSESSMENT & PLAN NOTE
Patient's blood pressure range in the last 24 hours was: BP  Min: 110/82  Max: 126/94.The patient's inpatient anti-hypertensive regimen is listed below:  Current Antihypertensives  carvediloL tablet 3.125 mg, 2 times daily, Oral  hydrALAZINE injection 10 mg, Every 6 hours PRN, Intravenous    Plan  - BP is controlled, no changes needed to their regimen  -

## 2025-03-25 NOTE — PLAN OF CARE
Rec'd call from Irene @ [facility] stating  they are still  waiting on auth from pt's insurance to admit patient to their SNF.

## 2025-03-26 LAB
ANION GAP SERPL CALC-SCNC: 6 MEQ/L (ref 2–13)
BASOPHILS # BLD AUTO: 0.01 X10(3)/MCL (ref 0.01–0.08)
BASOPHILS NFR BLD AUTO: 0.1 % (ref 0.1–1.2)
BUN SERPL-MCNC: 30 MG/DL (ref 7–20)
CALCIUM SERPL-MCNC: 8.4 MG/DL (ref 8.4–10.2)
CHLORIDE SERPL-SCNC: 93 MMOL/L (ref 98–110)
CO2 SERPL-SCNC: 25 MMOL/L (ref 21–32)
CREAT SERPL-MCNC: 1.04 MG/DL (ref 0.66–1.25)
CREAT/UREA NIT SERPL: 29 (ref 12–20)
EOSINOPHIL # BLD AUTO: 0.02 X10(3)/MCL (ref 0.04–0.54)
EOSINOPHIL NFR BLD AUTO: 0.3 % (ref 0.7–7)
ERYTHROCYTE [DISTWIDTH] IN BLOOD BY AUTOMATED COUNT: 14.7 %
GFR SERPLBLD CREATININE-BSD FMLA CKD-EPI: 78 ML/MIN/1.73/M2
GLUCOSE SERPL-MCNC: 91 MG/DL (ref 70–115)
HCT VFR BLD AUTO: 42.1 % (ref 36–52)
HGB BLD-MCNC: 14.9 G/DL (ref 13–18)
IMM GRANULOCYTES # BLD AUTO: 0.02 X10(3)/MCL (ref 0–0.03)
IMM GRANULOCYTES NFR BLD AUTO: 0.3 % (ref 0–0.5)
LYMPHOCYTES # BLD AUTO: 1.54 X10(3)/MCL (ref 1.32–3.57)
LYMPHOCYTES NFR BLD AUTO: 22.2 % (ref 20–55)
MCH RBC QN AUTO: 31 PG (ref 27–34)
MCHC RBC AUTO-ENTMCNC: 35.4 G/DL (ref 31–37)
MCV RBC AUTO: 87.7 FL (ref 79–99)
MONOCYTES # BLD AUTO: 0.74 X10(3)/MCL (ref 0.3–0.82)
MONOCYTES NFR BLD AUTO: 10.6 % (ref 4.7–12.5)
NEUTROPHILS # BLD AUTO: 4.62 X10(3)/MCL (ref 1.78–5.38)
NEUTROPHILS NFR BLD AUTO: 66.5 % (ref 37–73)
NRBC BLD AUTO-RTO: 0 %
PLATELET # BLD AUTO: 174 X10(3)/MCL (ref 140–371)
PMV BLD AUTO: 10.4 FL (ref 9.4–12.4)
POTASSIUM SERPL-SCNC: 4 MMOL/L (ref 3.5–5.1)
RBC # BLD AUTO: 4.8 X10(6)/MCL (ref 4–6)
SODIUM SERPL-SCNC: 124 MMOL/L (ref 136–145)
WBC # BLD AUTO: 6.95 X10(3)/MCL (ref 4–11.5)

## 2025-03-26 PROCEDURE — 25000003 PHARM REV CODE 250: Performed by: EMERGENCY MEDICINE

## 2025-03-26 PROCEDURE — 94761 N-INVAS EAR/PLS OXIMETRY MLT: CPT

## 2025-03-26 PROCEDURE — 25000003 PHARM REV CODE 250: Performed by: FAMILY MEDICINE

## 2025-03-26 PROCEDURE — 80048 BASIC METABOLIC PNL TOTAL CA: CPT | Performed by: FAMILY MEDICINE

## 2025-03-26 PROCEDURE — 25000003 PHARM REV CODE 250: Performed by: INTERNAL MEDICINE

## 2025-03-26 PROCEDURE — 36415 COLL VENOUS BLD VENIPUNCTURE: CPT | Performed by: FAMILY MEDICINE

## 2025-03-26 PROCEDURE — 21400001 HC TELEMETRY ROOM

## 2025-03-26 PROCEDURE — 85025 COMPLETE CBC W/AUTO DIFF WBC: CPT | Performed by: FAMILY MEDICINE

## 2025-03-26 RX ORDER — METOPROLOL TARTRATE 25 MG/1
25 TABLET, FILM COATED ORAL 2 TIMES DAILY
Status: DISCONTINUED | OUTPATIENT
Start: 2025-03-26 | End: 2025-04-02 | Stop reason: HOSPADM

## 2025-03-26 RX ADMIN — CARVEDILOL 3.12 MG: 3.12 TABLET, FILM COATED ORAL at 09:03

## 2025-03-26 RX ADMIN — FAMOTIDINE 20 MG: 20 TABLET, FILM COATED ORAL at 09:03

## 2025-03-26 RX ADMIN — CALCIUM CARBONATE (ANTACID) CHEW TAB 500 MG 500 MG: 500 CHEW TAB at 09:03

## 2025-03-26 RX ADMIN — ASPIRIN 81 MG: 81 TABLET ORAL at 09:03

## 2025-03-26 RX ADMIN — ESCITALOPRAM OXALATE 10 MG: 10 TABLET ORAL at 09:03

## 2025-03-26 RX ADMIN — CALCIUM CARBONATE (ANTACID) CHEW TAB 500 MG 500 MG: 500 CHEW TAB at 05:03

## 2025-03-26 RX ADMIN — METOPROLOL TARTRATE 25 MG: 25 TABLET, FILM COATED ORAL at 05:03

## 2025-03-26 RX ADMIN — RIVAROXABAN 20 MG: 20 TABLET, FILM COATED ORAL at 05:03

## 2025-03-26 RX ADMIN — Medication 6 MG: at 09:03

## 2025-03-26 RX ADMIN — SACUBITRIL AND VALSARTAN 1 TABLET: 24; 26 TABLET, FILM COATED ORAL at 09:03

## 2025-03-26 RX ADMIN — CALCIUM CARBONATE (ANTACID) CHEW TAB 500 MG 500 MG: 500 CHEW TAB at 12:03

## 2025-03-26 RX ADMIN — ATORVASTATIN CALCIUM 40 MG: 40 TABLET, FILM COATED ORAL at 09:03

## 2025-03-26 NOTE — PT/OT/SLP PROGRESS
Physical Therapy      Patient Name:  William South   MRN:  [id number]    Patient not seen today secondary to  (Patient politley declined to walk today). Will follow-up 3/27/25.

## 2025-03-26 NOTE — PLAN OF CARE
competed the Peer to Peer offered by [facility] Managed [facility] Dual Complete.

## 2025-03-26 NOTE — PLAN OF CARE
03/26/25 1352   Discharge Reassessment   Assessment Type Discharge Planning Reassessment   Did the patient's condition or plan change since previous assessment? No   Discharge Plan discussed with: Patient;Adult children   Name(s) and Number(s) Mimi Lee-Daughter [phone]   Communicated CHASIDY with patient/caregiver Yes   Discharge Plan A Skilled Nursing Facility   DME Needed Upon Discharge  none   Transition of Care Barriers Does not adhere to care plan;Mobility;No family/friends to help;Nursing Home rejection;Social;Substance Abuse;Transportation   Why the patient remains in the hospital Placement issues   Post-Acute Status   Post-Acute Authorization Placement   Post-Acute Placement Status Pending post-acute provider review/more information requested  (Peer to Peer completed by .)   Coverage [facility] Dual Complete   Discharge Delays (!) Post-Acute Set-up

## 2025-03-26 NOTE — ASSESSMENT & PLAN NOTE
Patient's blood pressure range in the last 24 hours was: BP  Min: 109/79  Max: 136/77.The patient's inpatient anti-hypertensive regimen is listed below:  Current Antihypertensives  carvediloL tablet 3.125 mg, 2 times daily, Oral  hydrALAZINE injection 10 mg, Every 6 hours PRN, Intravenous    Plan  - BP is controlled, no changes needed to their regimen  -

## 2025-03-26 NOTE — SUBJECTIVE & OBJECTIVE
Interval History:     Review of Systems   Constitutional:  Negative for activity change, appetite change, fatigue and fever.   Respiratory:  Negative for apnea, cough, choking, chest tightness, shortness of breath and wheezing.    Cardiovascular:  Negative for chest pain and leg swelling.   Gastrointestinal:  Negative for abdominal distention, abdominal pain, constipation, diarrhea, nausea and vomiting.   Musculoskeletal:  Negative for gait problem, joint swelling, myalgias and neck pain.   Skin:  Negative for pallor, rash and wound.     Objective:     Vital Signs (Most Recent):  Temp: 98.4 °F (36.9 °C) (03/26/25 1100)  Pulse: 93 (03/26/25 1100)  Resp: 17 (03/26/25 1100)  BP: 119/87 (03/26/25 1100)  SpO2: 98 % (03/26/25 1100) Vital Signs (24h Range):  Temp:  [96.1 °F (35.6 °C)-98.4 °F (36.9 °C)] 98.4 °F (36.9 °C)  Pulse:  [67-93] 93  Resp:  [17-18] 17  SpO2:  [93 %-100 %] 98 %  BP: (109-136)/(72-93) 119/87     Weight: 66.8 kg (147 lb 3.2 oz)  Body mass index is 23.05 kg/m².    Intake/Output Summary (Last 24 hours) at 3/26/2025 1209  Last data filed at 3/26/2025 0835  Gross per 24 hour   Intake 240 ml   Output --   Net 240 ml         Physical Exam  Constitutional:       General: He is not in acute distress.     Appearance: Normal appearance. He is ill-appearing. He is not toxic-appearing or diaphoretic.   Cardiovascular:      Rate and Rhythm: Normal rate and regular rhythm.      Pulses: Normal pulses.      Heart sounds: Normal heart sounds. No murmur heard.  Pulmonary:      Effort: Pulmonary effort is normal. No respiratory distress.      Breath sounds: Rales present. No wheezing or rhonchi.   Abdominal:      General: Abdomen is flat. Bowel sounds are normal. There is no distension.      Palpations: Abdomen is soft.      Tenderness: There is no abdominal tenderness. There is no guarding.   Musculoskeletal:         General: No swelling or tenderness.      Right lower leg: No edema.      Left lower leg: No edema.    Skin:     General: Skin is warm and dry.      Coloration: Skin is not jaundiced or pale.      Findings: No lesion or rash.   Neurological:      General: No focal deficit present.      Mental Status: He is alert and oriented to person, place, and time.      Coordination: Coordination normal.   Psychiatric:         Mood and Affect: Mood normal.         Behavior: Behavior normal.               Significant Labs: All pertinent labs within the past 24 hours have been reviewed.    Significant Imaging: I have reviewed all pertinent imaging results/findings within the past 24 hours.

## 2025-03-26 NOTE — PROGRESS NOTES
[facility] Medicine  Progress Note    Patient Name: William South  MRN: [id number]  Patient Class: IP- Inpatient   Admission Date: 3/17/2025  Length of Stay: 8 days  Attending Physician: Yossi Hernández MD  Primary Care Provider: Bette Soliz NP        Subjective     Principal Problem:Severe systolic congestive heart failure        HPI:  69-year-old male with past medical history significant combined congestive heart failure, hypertension, anxiety/depression, tobacco abuse and polysubstance abuse who presented to ED with complaints of shortness for breath leg and feet swelling patient noted increased shortness for breath especially with exertion but that has progressing to when he is at rest over the last few days.  Patient stated he is having difficulty even was walking short distances with increasing shortness a breath.  He noticed swelling to his bilateral feet migraine up his legs.  Patient was recently seen at the end of February with the use prescribed Lasix Xarelto anticoagulation in initiated on call immediate therapy for congestive heart failure.  At that time patient was also diagnosed with a pulmonary embolism he was discharged in his medication instructed to be compliant but patient stated he is not being compliant with medications he got confused with the administration.  During initial evaluation ED he was found to have urine drug screen that was positive for cocaine.  Denies any fever or chills nausea or vomiting.          Past Medical History:   Diagnosis Date    Anxiety disorder, unspecified      CHF (congestive heart failure)      Depression      Hypertension      Hyponatremia        Overview/Hospital Course:  3/19/2025  Sob much better today   Case mgmt working on snf     3/20/2025  No new c/o  Work with PT  Sob better     Today: Patient seen and examined at bedside, and chart reviewed.   03/21/2025 patient resting comfortably awaiting placement.  He denies  any shortness of breath currently.  We will continue his current medications labs look good we will repeat some in the morning.  03/22/2025 patient doing well waiting for placement.  He complains of a little bit of acid indigestion requests some Tums.  He states his bowels are moving normally.  Labs look good repeat some labs tomorrow and prescribe him some Tums.  03/24/2025 pt awake and alert confused, agreed to [facility] does not seem to be able to live independentlky, does not take his medicine and does not go to his appoitments.  03/25/2025 awiating insurance and state approval for placement  03/26/2025 no new c/o awaiting placement    Interval History:     Review of Systems   Constitutional:  Negative for activity change, appetite change, fatigue and fever.   Respiratory:  Negative for apnea, cough, choking, chest tightness, shortness of breath and wheezing.    Cardiovascular:  Negative for chest pain and leg swelling.   Gastrointestinal:  Negative for abdominal distention, abdominal pain, constipation, diarrhea, nausea and vomiting.   Musculoskeletal:  Negative for gait problem, joint swelling, myalgias and neck pain.   Skin:  Negative for pallor, rash and wound.     Objective:     Vital Signs (Most Recent):  Temp: 98.4 °F (36.9 °C) (03/26/25 1100)  Pulse: 93 (03/26/25 1100)  Resp: 17 (03/26/25 1100)  BP: 119/87 (03/26/25 1100)  SpO2: 98 % (03/26/25 1100) Vital Signs (24h Range):  Temp:  [96.1 °F (35.6 °C)-98.4 °F (36.9 °C)] 98.4 °F (36.9 °C)  Pulse:  [67-93] 93  Resp:  [17-18] 17  SpO2:  [93 %-100 %] 98 %  BP: (109-136)/(72-93) 119/87     Weight: 66.8 kg (147 lb 3.2 oz)  Body mass index is 23.05 kg/m².    Intake/Output Summary (Last 24 hours) at 3/26/2025 1209  Last data filed at 3/26/2025 0835  Gross per 24 hour   Intake 240 ml   Output --   Net 240 ml         Physical Exam  Constitutional:       General: He is not in acute distress.     Appearance: Normal appearance. He is ill-appearing. He is not  "toxic-appearing or diaphoretic.   Cardiovascular:      Rate and Rhythm: Normal rate and regular rhythm.      Pulses: Normal pulses.      Heart sounds: Normal heart sounds. No murmur heard.  Pulmonary:      Effort: Pulmonary effort is normal. No respiratory distress.      Breath sounds: Rales present. No wheezing or rhonchi.   Abdominal:      General: Abdomen is flat. Bowel sounds are normal. There is no distension.      Palpations: Abdomen is soft.      Tenderness: There is no abdominal tenderness. There is no guarding.   Musculoskeletal:         General: No swelling or tenderness.      Right lower leg: No edema.      Left lower leg: No edema.   Skin:     General: Skin is warm and dry.      Coloration: Skin is not jaundiced or pale.      Findings: No lesion or rash.   Neurological:      General: No focal deficit present.      Mental Status: He is alert and oriented to person, place, and time.      Coordination: Coordination normal.   Psychiatric:         Mood and Affect: Mood normal.         Behavior: Behavior normal.               Significant Labs: All pertinent labs within the past 24 hours have been reviewed.    Significant Imaging: I have reviewed all pertinent imaging results/findings within the past 24 hours.      Assessment & Plan  Severe systolic congestive heart failure  Patient has Systolic (HFrEF) heart failure that is Acute on chronic. On presentation their CHF was decompensated. Evidence of decompensated CHF on presentation includes: shortness of breath. The etiology of their decompensation is likely dietary indiscretion. Most recent BNP and echo results are listed below.  No results for input(s): "BNP" in the last 72 hours.  Latest ECHO  Results for orders placed during the hospital encounter of 02/26/25    Echo    Interpretation Summary    Left Ventricle: There is severely reduced systolic function with a visually estimated ejection fraction of 5 - 10%. Grade III diastolic dysfunction.    Right " Ventricle: Systolic function is mildly reduced.    Aortic Valve: There is mild (1+) aortic regurgitation.    Mitral Valve: There is moderate to severe (3+) regurgitation.    Tricuspid Valve: There is moderate to severe (3+) regurgitation.    Pulmonic Valve: There is mild (1+) regurgitation.    Current Heart Failure Medications  carvediloL tablet 3.125 mg, 2 times daily, Oral  sacubitriL-valsartan 24-26 mg per tablet 1 tablet, 2 times daily, Oral  hydrALAZINE injection 10 mg, Every 6 hours PRN, Intravenous    Plan  - Monitor strict I&Os and daily weights.    - Place on telemetry  - Low sodium diet  - Place on fluid restriction of 1.5 L.   - Cardiology has been consulted  - The patient's volume status is improving but not at their baseline as indicated by shortness of breath  -       Substance abuse  Patient urged to stop using drugs    Primary hypertension  Patient's blood pressure range in the last 24 hours was: BP  Min: 109/79  Max: 136/77.The patient's inpatient anti-hypertensive regimen is listed below:  Current Antihypertensives  carvediloL tablet 3.125 mg, 2 times daily, Oral  hydrALAZINE injection 10 mg, Every 6 hours PRN, Intravenous    Plan  - BP is controlled, no changes needed to their regimen  -   GERD (gastroesophageal reflux disease)  Tums PRN    VTE Risk Mitigation (From admission, onward)           Ordered     rivaroxaban tablet 20 mg  With dinner         03/17/25 1716                    Discharge Planning   CHASIDY: 3/26/2025     Code Status: Full Code   Medical Readiness for Discharge Date:   Discharge Plan A: Skilled Nursing Facility   Discharge Delays: (!) Post-Acute Set-up                    Racquel Soares MD  Department of Hospital Medicine   [facility]

## 2025-03-26 NOTE — PLAN OF CARE
03/26/25 0916   Medicare Message   Important Message from Medicare regarding Discharge Appeal Rights Other (comments);Given to patient/caregiver;Explained to patient/caregiver;Signed/date by patient/caregiver  (copy given)   Date IMM was signed 03/25/25   Time IMM was signed 1031

## 2025-03-27 PROBLEM — I48.91 ATRIAL FIBRILLATION: Status: ACTIVE | Noted: 2025-03-27

## 2025-03-27 LAB
ANION GAP SERPL CALC-SCNC: 6 MEQ/L (ref 2–13)
BASOPHILS # BLD AUTO: 0.02 X10(3)/MCL (ref 0.01–0.08)
BASOPHILS NFR BLD AUTO: 0.3 % (ref 0.1–1.2)
BUN SERPL-MCNC: 34 MG/DL (ref 7–20)
CALCIUM SERPL-MCNC: 8.6 MG/DL (ref 8.4–10.2)
CHLORIDE SERPL-SCNC: 93 MMOL/L (ref 98–110)
CO2 SERPL-SCNC: 28 MMOL/L (ref 21–32)
CREAT SERPL-MCNC: 1.12 MG/DL (ref 0.66–1.25)
CREAT/UREA NIT SERPL: 30 (ref 12–20)
EOSINOPHIL # BLD AUTO: 0.01 X10(3)/MCL (ref 0.04–0.54)
EOSINOPHIL NFR BLD AUTO: 0.1 % (ref 0.7–7)
ERYTHROCYTE [DISTWIDTH] IN BLOOD BY AUTOMATED COUNT: 15 %
GFR SERPLBLD CREATININE-BSD FMLA CKD-EPI: 71 ML/MIN/1.73/M2
GLUCOSE SERPL-MCNC: 77 MG/DL (ref 70–115)
HCT VFR BLD AUTO: 40.9 % (ref 36–52)
HGB BLD-MCNC: 14.4 G/DL (ref 13–18)
IMM GRANULOCYTES # BLD AUTO: 0.01 X10(3)/MCL (ref 0–0.03)
IMM GRANULOCYTES NFR BLD AUTO: 0.1 % (ref 0–0.5)
LACTATE SERPL-SCNC: 2.1 MMOL/L (ref 0.4–2)
LACTATE SERPL-SCNC: 4.8 MMOL/L (ref 0.4–2)
LYMPHOCYTES # BLD AUTO: 1.81 X10(3)/MCL (ref 1.32–3.57)
LYMPHOCYTES NFR BLD AUTO: 27.1 % (ref 20–55)
MCH RBC QN AUTO: 31 PG (ref 27–34)
MCHC RBC AUTO-ENTMCNC: 35.2 G/DL (ref 31–37)
MCV RBC AUTO: 88.1 FL (ref 79–99)
MONOCYTES # BLD AUTO: 0.85 X10(3)/MCL (ref 0.3–0.82)
MONOCYTES NFR BLD AUTO: 12.7 % (ref 4.7–12.5)
NEUTROPHILS # BLD AUTO: 3.97 X10(3)/MCL (ref 1.78–5.38)
NEUTROPHILS NFR BLD AUTO: 59.7 % (ref 37–73)
NRBC BLD AUTO-RTO: 0 %
PLATELET # BLD AUTO: 194 X10(3)/MCL (ref 140–371)
PMV BLD AUTO: 10.4 FL (ref 9.4–12.4)
POCT GLUCOSE: 78 MG/DL (ref 70–110)
POTASSIUM SERPL-SCNC: 4.2 MMOL/L (ref 3.5–5.1)
RBC # BLD AUTO: 4.64 X10(6)/MCL (ref 4–6)
SODIUM SERPL-SCNC: 127 MMOL/L (ref 136–145)
WBC # BLD AUTO: 6.67 X10(3)/MCL (ref 4–11.5)

## 2025-03-27 PROCEDURE — 21400001 HC TELEMETRY ROOM

## 2025-03-27 PROCEDURE — 94761 N-INVAS EAR/PLS OXIMETRY MLT: CPT

## 2025-03-27 PROCEDURE — 80048 BASIC METABOLIC PNL TOTAL CA: CPT | Performed by: FAMILY MEDICINE

## 2025-03-27 PROCEDURE — 25000003 PHARM REV CODE 250: Performed by: FAMILY MEDICINE

## 2025-03-27 PROCEDURE — 25000003 PHARM REV CODE 250: Performed by: INTERNAL MEDICINE

## 2025-03-27 PROCEDURE — 85025 COMPLETE CBC W/AUTO DIFF WBC: CPT | Performed by: FAMILY MEDICINE

## 2025-03-27 PROCEDURE — 87040 BLOOD CULTURE FOR BACTERIA: CPT | Performed by: FAMILY MEDICINE

## 2025-03-27 PROCEDURE — 36415 COLL VENOUS BLD VENIPUNCTURE: CPT | Performed by: FAMILY MEDICINE

## 2025-03-27 PROCEDURE — 83605 ASSAY OF LACTIC ACID: CPT | Performed by: FAMILY MEDICINE

## 2025-03-27 RX ADMIN — RIVAROXABAN 20 MG: 20 TABLET, FILM COATED ORAL at 04:03

## 2025-03-27 RX ADMIN — SACUBITRIL AND VALSARTAN 1 TABLET: 24; 26 TABLET, FILM COATED ORAL at 08:03

## 2025-03-27 RX ADMIN — HYDROCODONE BITARTRATE AND ACETAMINOPHEN 1 TABLET: 10; 325 TABLET ORAL at 02:03

## 2025-03-27 RX ADMIN — CALCIUM CARBONATE (ANTACID) CHEW TAB 500 MG 500 MG: 500 CHEW TAB at 04:03

## 2025-03-27 RX ADMIN — ASPIRIN 81 MG: 81 TABLET ORAL at 08:03

## 2025-03-27 RX ADMIN — CALCIUM CARBONATE (ANTACID) CHEW TAB 500 MG 500 MG: 500 CHEW TAB at 11:03

## 2025-03-27 RX ADMIN — FAMOTIDINE 20 MG: 20 TABLET, FILM COATED ORAL at 08:03

## 2025-03-27 RX ADMIN — METOPROLOL TARTRATE 25 MG: 25 TABLET, FILM COATED ORAL at 08:03

## 2025-03-27 RX ADMIN — CALCIUM CARBONATE (ANTACID) CHEW TAB 500 MG 500 MG: 500 CHEW TAB at 08:03

## 2025-03-27 RX ADMIN — ESCITALOPRAM OXALATE 10 MG: 10 TABLET ORAL at 08:03

## 2025-03-27 NOTE — ASSESSMENT & PLAN NOTE
"Patient has Systolic (HFrEF) heart failure that is Acute on chronic. On presentation their CHF was decompensated. Evidence of decompensated CHF on presentation includes: shortness of breath. The etiology of their decompensation is likely dietary indiscretion. Most recent BNP and echo results are listed below.  No results for input(s): "BNP" in the last 72 hours.  Latest ECHO  Results for orders placed during the hospital encounter of 02/26/25    Echo    Interpretation Summary    Left Ventricle: There is severely reduced systolic function with a visually estimated ejection fraction of 5 - 10%. Grade III diastolic dysfunction.    Right Ventricle: Systolic function is mildly reduced.    Aortic Valve: There is mild (1+) aortic regurgitation.    Mitral Valve: There is moderate to severe (3+) regurgitation.    Tricuspid Valve: There is moderate to severe (3+) regurgitation.    Pulmonic Valve: There is mild (1+) regurgitation.    Current Heart Failure Medications  sacubitriL-valsartan 24-26 mg per tablet 1 tablet, 2 times daily, Oral  hydrALAZINE injection 10 mg, Every 6 hours PRN, Intravenous    Plan  - Monitor strict I&Os and daily weights.    - Place on telemetry  - Low sodium diet  - Place on fluid restriction of 1.5 L.   - Cardiology has been consulted  - The patient's volume status is improving but not at their baseline as indicated by shortness of breath  -       "

## 2025-03-27 NOTE — NURSING
Dr. Soares made aware of pt temp continues to be low on Lotus hugger, new orders for blood cultures, chest xray and lactic acid ordered.

## 2025-03-27 NOTE — PLAN OF CARE
Peer to Peer Determination: Denial upheld.  Sarah at [facility] on behalf of [facility] gave me instructions on how to file a fast appeal for SNF authorization.

## 2025-03-27 NOTE — SUBJECTIVE & OBJECTIVE
Interval History:     Review of Systems   Constitutional:  Negative for activity change, appetite change, fatigue and fever.   Respiratory:  Negative for apnea, cough, choking, chest tightness, shortness of breath and wheezing.    Cardiovascular:  Negative for chest pain and leg swelling.   Gastrointestinal:  Negative for abdominal distention, abdominal pain, constipation, diarrhea, nausea and vomiting.   Musculoskeletal:  Negative for gait problem, joint swelling, myalgias and neck pain.   Skin:  Negative for pallor, rash and wound.     Objective:     Vital Signs (Most Recent):  Temp: 98.1 °F (36.7 °C) (03/27/25 0715)  Pulse: 84 (03/27/25 0745)  Resp: 18 (03/27/25 0745)  BP: (!) 137/99 (03/27/25 0715)  SpO2: 95 % (03/27/25 0745) Vital Signs (24h Range):  Temp:  [98 °F (36.7 °C)-98.4 °F (36.9 °C)] 98.1 °F (36.7 °C)  Pulse:  [71-86] 84  Resp:  [18] 18  SpO2:  [94 %-98 %] 95 %  BP: ()/(71-99) 137/99     Weight: 66.8 kg (147 lb 3.2 oz)  Body mass index is 23.05 kg/m².    Intake/Output Summary (Last 24 hours) at 3/27/2025 1112  Last data filed at 3/27/2025 0745  Gross per 24 hour   Intake 1210 ml   Output 300 ml   Net 910 ml         Physical Exam  Constitutional:       General: He is not in acute distress.     Appearance: Normal appearance. He is ill-appearing. He is not toxic-appearing or diaphoretic.   Cardiovascular:      Rate and Rhythm: Normal rate and regular rhythm.      Pulses: Normal pulses.      Heart sounds: Normal heart sounds. No murmur heard.  Pulmonary:      Effort: Pulmonary effort is normal. No respiratory distress.      Breath sounds: Rales present. No wheezing or rhonchi.   Abdominal:      General: Abdomen is flat. Bowel sounds are normal. There is no distension.      Palpations: Abdomen is soft.      Tenderness: There is no abdominal tenderness. There is no guarding.   Musculoskeletal:         General: No swelling or tenderness.      Right lower leg: No edema.      Left lower leg: No edema.    Skin:     General: Skin is warm and dry.      Coloration: Skin is not jaundiced or pale.      Findings: No lesion or rash.   Neurological:      General: No focal deficit present.      Mental Status: He is alert and oriented to person, place, and time.      Coordination: Coordination normal.   Psychiatric:         Mood and Affect: Mood normal.         Behavior: Behavior normal.               Significant Labs: All pertinent labs within the past 24 hours have been reviewed.    Significant Imaging: I have reviewed all pertinent imaging results/findings within the past 24 hours.

## 2025-03-27 NOTE — ASSESSMENT & PLAN NOTE
Patient has paroxysmal (<7 days) atrial fibrillation. Patient is currently in sinus rhythm. [id number] Score: 2. The patients heart rate in the last 24 hours is as follows:  Pulse  Min: 71  Max: 86     Antiarrhythmics  metoprolol tartrate (LOPRESSOR) tablet 25 mg, 2 times daily, Oral    Anticoagulants  rivaroxaban tablet 20 mg, With dinner, Oral    Plan  - Replete lytes with a goal of K>4, Mg >2  - Patient is anticoagulated, see medications listed above.  - Patient's afib is currently controlled  - labs ok, rate controlled and converted to sinus with po metoprolol

## 2025-03-27 NOTE — ASSESSMENT & PLAN NOTE
Patient's blood pressure range in the last 24 hours was: BP  Min: 90/73  Max: 137/99.The patient's inpatient anti-hypertensive regimen is listed below:  Current Antihypertensives  hydrALAZINE injection 10 mg, Every 6 hours PRN, Intravenous  metoprolol tartrate (LOPRESSOR) tablet 25 mg, 2 times daily, Oral    Plan  - BP is controlled, no changes needed to their regimen  -

## 2025-03-27 NOTE — PLAN OF CARE
notified of denial, she states she is willing to do a fast appeal for SNF placement if requested by patient and family.  Patient and patient's daughter-Mimi notified of insurance denial for SNF care.  They would like to do the fast appeal before making any other decisions.  I discussed possible long term placement at [facility].  I answered all their questions, they will discuss it further.

## 2025-03-27 NOTE — PT/OT/SLP PROGRESS
Physical Therapy      Patient Name:  William South   MRN:  [id number]    Patient not seen today secondary to  (Attempted twice today. First attempt, patient declined - very confused. Second attempt - patient sleeping soundly with blanket over head.). Will follow-up 3/28/25.

## 2025-03-27 NOTE — PROGRESS NOTES
[facility] Medicine  Progress Note    Patient Name: William South  MRN: [id number]  Patient Class: IP- Inpatient   Admission Date: 3/17/2025  Length of Stay: 9 days  Attending Physician: Yossi Hernández MD  Primary Care Provider: Bette Soliz NP        Subjective     Principal Problem:Severe systolic congestive heart failure        HPI:  69-year-old male with past medical history significant combined congestive heart failure, hypertension, anxiety/depression, tobacco abuse and polysubstance abuse who presented to ED with complaints of shortness for breath leg and feet swelling patient noted increased shortness for breath especially with exertion but that has progressing to when he is at rest over the last few days.  Patient stated he is having difficulty even was walking short distances with increasing shortness a breath.  He noticed swelling to his bilateral feet migraine up his legs.  Patient was recently seen at the end of February with the use prescribed Lasix Xarelto anticoagulation in initiated on call immediate therapy for congestive heart failure.  At that time patient was also diagnosed with a pulmonary embolism he was discharged in his medication instructed to be compliant but patient stated he is not being compliant with medications he got confused with the administration.  During initial evaluation ED he was found to have urine drug screen that was positive for cocaine.  Denies any fever or chills nausea or vomiting.          Past Medical History:   Diagnosis Date    Anxiety disorder, unspecified      CHF (congestive heart failure)      Depression      Hypertension      Hyponatremia        Overview/Hospital Course:   3/19/2025  Sob much better today   Case mgmt working on snf     3/20/2025  No new c/o  Work with PT  Sob better     Today: Patient seen and examined at bedside, and chart reviewed.   03/21/2025 patient resting comfortably awaiting placement.  He denies  any shortness of breath currently.  We will continue his current medications labs look good we will repeat some in the morning.  03/22/2025 patient doing well waiting for placement.  He complains of a little bit of acid indigestion requests some Tums.  He states his bowels are moving normally.  Labs look good repeat some labs tomorrow and prescribe him some Tums.  03/24/2025 pt awake and alert confused, agreed to [facility] does not seem to be able to live independentlky, does not take his medicine and does not go to his appoitments.  03/25/2025 awiating insurance and state approval for placement  03/26/2025 no new c/o awaiting placement  03/27/2025 pt with new onset a fib, h/o severe systolic CHF in the past known EF 10%.  PT changed to metoprolol and converted to sinus rhythm overnight HR currently 70-80s and denies chest pain or distress, he is already on DOAC due to recent PE.  Pt sitting up in chair this am, he wants to go home, however is agreeable to SNF at the nursing home.  He is still very weak.  Needs PT to resume prior independent living situation. He needs complex care due to significant medical issues, was previously independent, gets easily fatigued, very sob with exertion, has had a few good days with PT then has a day like today more confused and SOB.  Suspect he had subacute CVA affecting balance and coordination based on 2 older appearing stroke on the CT done few days ago.  He needs SNF level of care prior to returning home, would benefit from inpatient rehab, but have requested SNF due to his inability to do 3-4 hrs of PT daily and he would be able to do 1-2 hrs given his underlying medical illnesses    Interval History:     Review of Systems   Constitutional:  Negative for activity change, appetite change, fatigue and fever.   Respiratory:  Negative for apnea, cough, choking, chest tightness, shortness of breath and wheezing.    Cardiovascular:  Negative for chest pain and leg swelling.    Gastrointestinal:  Negative for abdominal distention, abdominal pain, constipation, diarrhea, nausea and vomiting.   Musculoskeletal:  Negative for gait problem, joint swelling, myalgias and neck pain.   Skin:  Negative for pallor, rash and wound.     Objective:     Vital Signs (Most Recent):  Temp: 98.1 °F (36.7 °C) (03/27/25 0715)  Pulse: 84 (03/27/25 0745)  Resp: 18 (03/27/25 0745)  BP: (!) 137/99 (03/27/25 0715)  SpO2: 95 % (03/27/25 0745) Vital Signs (24h Range):  Temp:  [98 °F (36.7 °C)-98.4 °F (36.9 °C)] 98.1 °F (36.7 °C)  Pulse:  [71-86] 84  Resp:  [18] 18  SpO2:  [94 %-98 %] 95 %  BP: ()/(71-99) 137/99     Weight: 66.8 kg (147 lb 3.2 oz)  Body mass index is 23.05 kg/m².    Intake/Output Summary (Last 24 hours) at 3/27/2025 1112  Last data filed at 3/27/2025 0745  Gross per 24 hour   Intake 1210 ml   Output 300 ml   Net 910 ml         Physical Exam  Constitutional:       General: He is not in acute distress.     Appearance: Normal appearance. He is ill-appearing. He is not toxic-appearing or diaphoretic.   Cardiovascular:      Rate and Rhythm: Normal rate and regular rhythm.      Pulses: Normal pulses.      Heart sounds: Normal heart sounds. No murmur heard.  Pulmonary:      Effort: Pulmonary effort is normal. No respiratory distress.      Breath sounds: Rales present. No wheezing or rhonchi.   Abdominal:      General: Abdomen is flat. Bowel sounds are normal. There is no distension.      Palpations: Abdomen is soft.      Tenderness: There is no abdominal tenderness. There is no guarding.   Musculoskeletal:         General: No swelling or tenderness.      Right lower leg: No edema.      Left lower leg: No edema.   Skin:     General: Skin is warm and dry.      Coloration: Skin is not jaundiced or pale.      Findings: No lesion or rash.   Neurological:      General: No focal deficit present.      Mental Status: He is alert and oriented to person, place, and time.      Coordination: Coordination normal.  "  Psychiatric:         Mood and Affect: Mood normal.         Behavior: Behavior normal.               Significant Labs: All pertinent labs within the past 24 hours have been reviewed.    Significant Imaging: I have reviewed all pertinent imaging results/findings within the past 24 hours.      Assessment & Plan  Severe systolic congestive heart failure  Patient has Systolic (HFrEF) heart failure that is Acute on chronic. On presentation their CHF was decompensated. Evidence of decompensated CHF on presentation includes: shortness of breath. The etiology of their decompensation is likely dietary indiscretion. Most recent BNP and echo results are listed below.  No results for input(s): "BNP" in the last 72 hours.  Latest ECHO  Results for orders placed during the hospital encounter of 02/26/25    Echo    Interpretation Summary    Left Ventricle: There is severely reduced systolic function with a visually estimated ejection fraction of 5 - 10%. Grade III diastolic dysfunction.    Right Ventricle: Systolic function is mildly reduced.    Aortic Valve: There is mild (1+) aortic regurgitation.    Mitral Valve: There is moderate to severe (3+) regurgitation.    Tricuspid Valve: There is moderate to severe (3+) regurgitation.    Pulmonic Valve: There is mild (1+) regurgitation.    Current Heart Failure Medications  sacubitriL-valsartan 24-26 mg per tablet 1 tablet, 2 times daily, Oral  hydrALAZINE injection 10 mg, Every 6 hours PRN, Intravenous    Plan  - Monitor strict I&Os and daily weights.    - Place on telemetry  - Low sodium diet  - Place on fluid restriction of 1.5 L.   - Cardiology has been consulted  - The patient's volume status is improving but not at their baseline as indicated by shortness of breath  -       Substance abuse  Patient urged to stop using drugs    Primary hypertension  Patient's blood pressure range in the last 24 hours was: BP  Min: 90/73  Max: 137/99.The patient's inpatient anti-hypertensive " regimen is listed below:  Current Antihypertensives  hydrALAZINE injection 10 mg, Every 6 hours PRN, Intravenous  metoprolol tartrate (LOPRESSOR) tablet 25 mg, 2 times daily, Oral    Plan  - BP is controlled, no changes needed to their regimen  -   GERD (gastroesophageal reflux disease)  Tums PRN    Atrial fibrillation  Patient has paroxysmal (<7 days) atrial fibrillation. Patient is currently in sinus rhythm. [id number] Score: 2. The patients heart rate in the last 24 hours is as follows:  Pulse  Min: 71  Max: 86     Antiarrhythmics  metoprolol tartrate (LOPRESSOR) tablet 25 mg, 2 times daily, Oral    Anticoagulants  rivaroxaban tablet 20 mg, With dinner, Oral    Plan  - Replete lytes with a goal of K>4, Mg >2  - Patient is anticoagulated, see medications listed above.  - Patient's afib is currently controlled  - labs ok, rate controlled and converted to sinus with po metoprolol      VTE Risk Mitigation (From admission, onward)           Ordered     rivaroxaban tablet 20 mg  With dinner         03/17/25 1716                    Discharge Planning   CHASIDY: 3/27/2025     Code Status: Full Code   Medical Readiness for Discharge Date:   Discharge Plan A: Skilled Nursing Facility   Discharge Delays: (!) Post-Acute Set-up                    Racquel Soares MD  Department of Hospital Medicine   [facility]

## 2025-03-27 NOTE — PLAN OF CARE
Information for fast appeal submitted over the phone to [facility] at [phone].

## 2025-03-27 NOTE — PLAN OF CARE
[facility] on behalf of [facility]-Fast Appeal requested clinicals submitted to fax # [phone].

## 2025-03-28 LAB
ANION GAP SERPL CALC-SCNC: 5 MEQ/L (ref 2–13)
BASOPHILS # BLD AUTO: 0.01 X10(3)/MCL (ref 0.01–0.08)
BASOPHILS NFR BLD AUTO: 0.2 % (ref 0.1–1.2)
BUN SERPL-MCNC: 41 MG/DL (ref 7–20)
CALCIUM SERPL-MCNC: 8.6 MG/DL (ref 8.4–10.2)
CHLORIDE SERPL-SCNC: 94 MMOL/L (ref 98–110)
CO2 SERPL-SCNC: 25 MMOL/L (ref 21–32)
CREAT SERPL-MCNC: 1.28 MG/DL (ref 0.66–1.25)
CREAT/UREA NIT SERPL: 32 (ref 12–20)
EOSINOPHIL # BLD AUTO: 0.01 X10(3)/MCL (ref 0.04–0.54)
EOSINOPHIL NFR BLD AUTO: 0.2 % (ref 0.7–7)
ERYTHROCYTE [DISTWIDTH] IN BLOOD BY AUTOMATED COUNT: 14.6 %
GFR SERPLBLD CREATININE-BSD FMLA CKD-EPI: 61 ML/MIN/1.73/M2
GLUCOSE SERPL-MCNC: 74 MG/DL (ref 70–115)
HCT VFR BLD AUTO: 40 % (ref 36–52)
HGB BLD-MCNC: 14.1 G/DL (ref 13–18)
IMM GRANULOCYTES # BLD AUTO: 0.01 X10(3)/MCL (ref 0–0.03)
IMM GRANULOCYTES NFR BLD AUTO: 0.2 % (ref 0–0.5)
LYMPHOCYTES # BLD AUTO: 1.76 X10(3)/MCL (ref 1.32–3.57)
LYMPHOCYTES NFR BLD AUTO: 34 % (ref 20–55)
MCH RBC QN AUTO: 30.7 PG (ref 27–34)
MCHC RBC AUTO-ENTMCNC: 35.3 G/DL (ref 31–37)
MCV RBC AUTO: 87 FL (ref 79–99)
MONOCYTES # BLD AUTO: 0.52 X10(3)/MCL (ref 0.3–0.82)
MONOCYTES NFR BLD AUTO: 10 % (ref 4.7–12.5)
NEUTROPHILS # BLD AUTO: 2.87 X10(3)/MCL (ref 1.78–5.38)
NEUTROPHILS NFR BLD AUTO: 55.4 % (ref 37–73)
NRBC BLD AUTO-RTO: 0 %
PLATELET # BLD AUTO: 196 X10(3)/MCL (ref 140–371)
PMV BLD AUTO: 10.7 FL (ref 9.4–12.4)
POTASSIUM SERPL-SCNC: 4.4 MMOL/L (ref 3.5–5.1)
RBC # BLD AUTO: 4.6 X10(6)/MCL (ref 4–6)
SODIUM SERPL-SCNC: 124 MMOL/L (ref 136–145)
WBC # BLD AUTO: 5.18 X10(3)/MCL (ref 4–11.5)

## 2025-03-28 PROCEDURE — 36415 COLL VENOUS BLD VENIPUNCTURE: CPT | Performed by: FAMILY MEDICINE

## 2025-03-28 PROCEDURE — 97530 THERAPEUTIC ACTIVITIES: CPT

## 2025-03-28 PROCEDURE — 80048 BASIC METABOLIC PNL TOTAL CA: CPT | Performed by: FAMILY MEDICINE

## 2025-03-28 PROCEDURE — 25000003 PHARM REV CODE 250: Performed by: FAMILY MEDICINE

## 2025-03-28 PROCEDURE — 25000003 PHARM REV CODE 250: Performed by: INTERNAL MEDICINE

## 2025-03-28 PROCEDURE — 21400001 HC TELEMETRY ROOM

## 2025-03-28 PROCEDURE — 94761 N-INVAS EAR/PLS OXIMETRY MLT: CPT

## 2025-03-28 PROCEDURE — 85025 COMPLETE CBC W/AUTO DIFF WBC: CPT | Performed by: FAMILY MEDICINE

## 2025-03-28 RX ORDER — LORAZEPAM 0.5 MG/1
0.5 TABLET ORAL EVERY 6 HOURS PRN
Status: DISCONTINUED | OUTPATIENT
Start: 2025-03-28 | End: 2025-03-31

## 2025-03-28 RX ORDER — FAMOTIDINE 20 MG/1
20 TABLET, FILM COATED ORAL DAILY
Status: DISCONTINUED | OUTPATIENT
Start: 2025-03-28 | End: 2025-04-02 | Stop reason: HOSPADM

## 2025-03-28 RX ADMIN — LORAZEPAM 0.5 MG: 0.5 TABLET ORAL at 04:03

## 2025-03-28 RX ADMIN — RIVAROXABAN 20 MG: 20 TABLET, FILM COATED ORAL at 04:03

## 2025-03-28 RX ADMIN — SACUBITRIL AND VALSARTAN 1 TABLET: 24; 26 TABLET, FILM COATED ORAL at 09:03

## 2025-03-28 RX ADMIN — CALCIUM CARBONATE (ANTACID) CHEW TAB 500 MG 500 MG: 500 CHEW TAB at 09:03

## 2025-03-28 RX ADMIN — METOPROLOL TARTRATE 25 MG: 25 TABLET, FILM COATED ORAL at 09:03

## 2025-03-28 RX ADMIN — ATORVASTATIN CALCIUM 40 MG: 40 TABLET, FILM COATED ORAL at 09:03

## 2025-03-28 RX ADMIN — FAMOTIDINE 20 MG: 20 TABLET, FILM COATED ORAL at 09:03

## 2025-03-28 RX ADMIN — ASPIRIN 81 MG: 81 TABLET ORAL at 09:03

## 2025-03-28 RX ADMIN — ESCITALOPRAM OXALATE 10 MG: 10 TABLET ORAL at 09:03

## 2025-03-28 NOTE — PT/OT/SLP PROGRESS
"Physical Therapy Treatment    Patient Name:  William South   MRN:  [id number]    Recommendations:     Discharge Recommendations: Moderate Intensity Therapy  Discharge Equipment Recommendations: to be determined by next level of care  Barriers to discharge:  current medical status    Assessment:     William South is a 69 y.o. male admitted with a medical diagnosis of Severe systolic congestive heart failure.  He presents with the following impairments/functional limitations: impaired endurance, impaired functional mobility, impaired balance, impaired cardiopulmonary response to activity     Patient found with HOB elevated. Patient agreeable to PT tx, but demonstrates confusion. Therapist asked patient if he was willing to go walk, and patient agreed, but then stared off into space, toward TV. Patient then started to sit up, and therapist asked if patient was getting up and he said yes. Once sitting, patient became confused again, stating he "didn't understand what is going on". Patient attempted to pull IV but therapist was able to redirect him. Patient tolerated sitting for approximately 7 mins before stating that he wanted to lay down, but didn't understand how. Therapist assisted patient in sit to supine. Patient left with HOB elevated and all needs met. Nurse notified.    Rehab Prognosis: Fair; patient would benefit from acute skilled PT services to address these deficits and reach maximum level of function.    Recent Surgery: * No surgery found *      Plan:     During this hospitalization, patient to be seen 5 x/week (5-6x weekly/1-2x daily) to address the identified rehab impairments via gait training, therapeutic activities, therapeutic exercises and progress toward the following goals:    Plan of Care Expires:  04/18/25    Subjective     Chief Complaint: none verbalized  Patient/Family Comments/goals: to get better  Pain/Comfort:         Objective:     Communicated with nursing prior to session.  Patient found " HOB elevated with   upon PT entry to room.     General Precautions: Standard, fall  Orthopedic Precautions: N/A  Braces: N/A  Respiratory Status: Room air     Functional Mobility:  Bed Mobility:     Supine to Sit: stand by assistance  Sit to Supine: minimum assistance and moderate assistance      AM-PAC 6 CLICK MOBILITY          Treatment & Education:  See above    Patient left HOB elevated with all lines intact, call button in reach, bed alarm on, and nurse notified..    GOALS:   Multidisciplinary Problems       Physical Therapy Goals          Problem: Physical Therapy    Goal Priority Disciplines Outcome Interventions   Physical Therapy Goal     PT, PT/OT Progressing    Description: Goals to be met by: discharge     Patient will increase functional independence with mobility by performin. Supine to sit with Modified [place]  2. Sit to stand transfer with Modified [place]  3. Gait  x 150 feet with Modified [place] using Rolling Walker/no AD.                          DME Justifications:  No DME recommended requiring DME justifications    Time Tracking:     PT Received On: 25  PT Start Time: 0950     PT Stop Time: 0959  PT Total Time (min): 9 min     Billable Minutes: Therapeutic Activity 9    Treatment Type: Treatment  PT/PTA: PT           2025
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
## 2025-03-28 NOTE — PLAN OF CARE
Problem: Adult Inpatient Plan of Care  Goal: Plan of Care Review  Outcome: Progressing  Goal: Patient-Specific Goal (Individualized)  Outcome: Progressing  Goal: Absence of Hospital-Acquired Illness or Injury  Outcome: Progressing  Goal: Optimal Comfort and Wellbeing  Outcome: Progressing  Goal: Readiness for Transition of Care  Outcome: Progressing     Problem: Fall Injury Risk  Goal: Absence of Fall and Fall-Related Injury  Outcome: Progressing     Problem: Comorbidity Management  Goal: Maintenance of Heart Failure Symptom Control  Outcome: Progressing  Goal: Blood Pressure in Desired Range  Outcome: Progressing     Problem: Heart Failure  Goal: Optimal Coping  Outcome: Progressing  Goal: Optimal Cardiac Output  Outcome: Progressing  Goal: Stable Heart Rate and Rhythm  Outcome: Progressing  Goal: Optimal Functional Ability  Outcome: Progressing  Goal: Fluid and Electrolyte Balance  Outcome: Progressing  Goal: Improved Oral Intake  Outcome: Progressing  Goal: Effective Oxygenation and Ventilation  Outcome: Progressing  Goal: Effective Breathing Pattern During Sleep  Outcome: Progressing

## 2025-03-28 NOTE — PROGRESS NOTES
Pharmacist Renal Dose Adjustment Note    William South is a 69 y.o. male being treated with the medication famotidine    Patient Data:    Vital Signs (Most Recent):  Temp: 97.7 °F (36.5 °C) (03/28/25 0701)  Pulse: 78 (03/28/25 0701)  Resp: (!) 26 (03/28/25 0701)  BP: 106/79 (03/28/25 0701)  SpO2: 96 % (03/28/25 0701) Vital Signs (72h Range):  Temp:  [94.2 °F (34.6 °C)-98.4 °F (36.9 °C)]   Pulse:  [67-93]   Resp:  [16-26]   BP: ()/(71-99)   SpO2:  [92 %-100 %]      Recent Labs   Lab 03/26/25  0433 03/27/25  0439 03/28/25  0455   CREATININE 1.04 1.12 1.28*     Serum creatinine: 1.28 mg/dL (H) 03/28/25 0455  Estimated creatinine clearance: 50.9 mL/min (A)    Medication:Famotidine dose: 20mg frequency Bid will be changed to medication:Famotidine dose:20mg frequency:Daily    Pharmacist's Name: Eligio Marshall  Pharmacist's Extension: [id number]

## 2025-03-28 NOTE — SUBJECTIVE & OBJECTIVE
Interval History:     Review of Systems   Constitutional:  Negative for activity change, appetite change, fatigue and fever.   Respiratory:  Negative for cough, chest tightness, shortness of breath and wheezing.    Cardiovascular:  Negative for chest pain and leg swelling.   Gastrointestinal:  Negative for abdominal distention, abdominal pain, constipation, diarrhea, nausea and vomiting.   Musculoskeletal:  Positive for myalgias.   Skin:  Negative for pallor, rash and wound.   Neurological:  Positive for weakness.     Objective:     Vital Signs (Most Recent):  Temp: 97.7 °F (36.5 °C) (03/28/25 1100)  Pulse: 83 (03/28/25 1100)  Resp: (!) 24 (03/28/25 1100)  BP: 106/78 (03/28/25 1100)  SpO2: 95 % (03/28/25 1100) Vital Signs (24h Range):  Temp:  [94.2 °F (34.6 °C)-98.3 °F (36.8 °C)] 97.7 °F (36.5 °C)  Pulse:  [69-85] 83  Resp:  [16-26] 24  SpO2:  [93 %-99 %] 95 %  BP: (104-119)/(78-88) 106/78     Weight: 66.8 kg (147 lb 3.2 oz)  Body mass index is 23.05 kg/m².    Intake/Output Summary (Last 24 hours) at 3/28/2025 1400  Last data filed at 3/28/2025 1113  Gross per 24 hour   Intake 440 ml   Output 240 ml   Net 200 ml         Physical Exam  Constitutional:       General: He is not in acute distress.     Appearance: Normal appearance. He is ill-appearing. He is not toxic-appearing or diaphoretic.   Cardiovascular:      Rate and Rhythm: Normal rate and regular rhythm.      Pulses: Normal pulses.      Heart sounds: Normal heart sounds. No murmur heard.  Pulmonary:      Effort: Pulmonary effort is normal. No respiratory distress.      Breath sounds: Rales present. No wheezing or rhonchi.   Abdominal:      General: Abdomen is flat. Bowel sounds are normal. There is no distension.      Palpations: Abdomen is soft.      Tenderness: There is no abdominal tenderness. There is no guarding.   Musculoskeletal:         General: No swelling or tenderness.      Right lower leg: No edema.      Left lower leg: No edema.   Skin:      General: Skin is warm and dry.      Coloration: Skin is not jaundiced or pale.      Findings: No lesion or rash.   Neurological:      General: No focal deficit present.      Mental Status: He is alert. Mental status is at baseline.      Coordination: Coordination normal.   Psychiatric:         Mood and Affect: Mood normal.         Behavior: Behavior normal.               Significant Labs: All pertinent labs within the past 24 hours have been reviewed.    Significant Imaging: I have reviewed all pertinent imaging results/findings within the past 24 hours.

## 2025-03-28 NOTE — NURSING
CRITICAL LAB NOTED. TELEMD REQUEST SENT W/PT UPDATE.  2000-DR. SUAZO CALLED. NO NEW ORDERS NOTED.  0615-PT AWAKE. WAS ABLE TO STAND AT BEDSIDE AND VOID IN URINAL. RETURNED TO BED W/SR UP X4. CB NEAR. STATES HE JUST WANTED TO BE LEFT ALONE.

## 2025-03-28 NOTE — PROGRESS NOTES
[facility] Medicine  Progress Note    Patient Name: William South  MRN: [id number]  Patient Class: IP- Inpatient   Admission Date: 3/17/2025  Length of Stay: 10 days  Attending Physician: Yossi Hernández MD  Primary Care Provider: Bette Soliz NP        Subjective     Principal Problem:Severe systolic congestive heart failure        HPI:  69-year-old male with past medical history significant combined congestive heart failure, hypertension, anxiety/depression, tobacco abuse and polysubstance abuse who presented to ED with complaints of shortness for breath leg and feet swelling patient noted increased shortness for breath especially with exertion but that has progressing to when he is at rest over the last few days.  Patient stated he is having difficulty even was walking short distances with increasing shortness a breath.  He noticed swelling to his bilateral feet migraine up his legs.  Patient was recently seen at the end of February with the use prescribed Lasix Xarelto anticoagulation in initiated on call immediate therapy for congestive heart failure.  At that time patient was also diagnosed with a pulmonary embolism he was discharged in his medication instructed to be compliant but patient stated he is not being compliant with medications he got confused with the administration.  During initial evaluation ED he was found to have urine drug screen that was positive for cocaine.  Denies any fever or chills nausea or vomiting.          Past Medical History:   Diagnosis Date    Anxiety disorder, unspecified      CHF (congestive heart failure)      Depression      Hypertension      Hyponatremia        Overview/Hospital Course:  3/19/2025  Sob much better today   Case mgmt working on snf     3/20/2025  No new c/o  Work with PT  Sob better     Today: Patient seen and examined at bedside, and chart reviewed.   03/21/2025 patient resting comfortably awaiting placement.  He denies  any shortness of breath currently.  We will continue his current medications labs look good we will repeat some in the morning.  03/22/2025 patient doing well waiting for placement.  He complains of a little bit of acid indigestion requests some Tums.  He states his bowels are moving normally.  Labs look good repeat some labs tomorrow and prescribe him some Tums.  03/24/2025 pt awake and alert confused, agreed to [facility] does not seem to be able to live independentlky, does not take his medicine and does not go to his appoitments.  03/25/2025 awiating insurance and state approval for placement  03/26/2025 no new c/o awaiting placement  03/27/2025 pt with new onset a fib, h/o severe systolic CHF in the past known EF 10%.  PT changed to metoprolol and converted to sinus rhythm overnight HR currently 70-80s and denies chest pain or distress, he is already on DOAC due to recent PE.  Pt sitting up in chair this am, he wants to go home, however is agreeable to SNF at the nursing home.  He is still very weak.  Needs PT to resume prior independent living situation.  03/28/2025 pt confused at time, awaiting appeal with insurance as they have denied him SNF, he is not safe to go home, needs some PT to try to resume prior functional level.    Interval History:     Review of Systems   Constitutional:  Negative for activity change, appetite change, fatigue and fever.   Respiratory:  Negative for cough, chest tightness, shortness of breath and wheezing.    Cardiovascular:  Negative for chest pain and leg swelling.   Gastrointestinal:  Negative for abdominal distention, abdominal pain, constipation, diarrhea, nausea and vomiting.   Musculoskeletal:  Positive for myalgias.   Skin:  Negative for pallor, rash and wound.   Neurological:  Positive for weakness.     Objective:     Vital Signs (Most Recent):  Temp: 97.7 °F (36.5 °C) (03/28/25 1100)  Pulse: 83 (03/28/25 1100)  Resp: (!) 24 (03/28/25 1100)  BP: 106/78 (03/28/25  1100)  SpO2: 95 % (03/28/25 1100) Vital Signs (24h Range):  Temp:  [94.2 °F (34.6 °C)-98.3 °F (36.8 °C)] 97.7 °F (36.5 °C)  Pulse:  [69-85] 83  Resp:  [16-26] 24  SpO2:  [93 %-99 %] 95 %  BP: (104-119)/(78-88) 106/78     Weight: 66.8 kg (147 lb 3.2 oz)  Body mass index is 23.05 kg/m².    Intake/Output Summary (Last 24 hours) at 3/28/2025 1400  Last data filed at 3/28/2025 1113  Gross per 24 hour   Intake 440 ml   Output 240 ml   Net 200 ml         Physical Exam  Constitutional:       General: He is not in acute distress.     Appearance: Normal appearance. He is ill-appearing. He is not toxic-appearing or diaphoretic.   Cardiovascular:      Rate and Rhythm: Normal rate and regular rhythm.      Pulses: Normal pulses.      Heart sounds: Normal heart sounds. No murmur heard.  Pulmonary:      Effort: Pulmonary effort is normal. No respiratory distress.      Breath sounds: Rales present. No wheezing or rhonchi.   Abdominal:      General: Abdomen is flat. Bowel sounds are normal. There is no distension.      Palpations: Abdomen is soft.      Tenderness: There is no abdominal tenderness. There is no guarding.   Musculoskeletal:         General: No swelling or tenderness.      Right lower leg: No edema.      Left lower leg: No edema.   Skin:     General: Skin is warm and dry.      Coloration: Skin is not jaundiced or pale.      Findings: No lesion or rash.   Neurological:      General: No focal deficit present.      Mental Status: He is alert. Mental status is at baseline.      Coordination: Coordination normal.   Psychiatric:         Mood and Affect: Mood normal.         Behavior: Behavior normal.               Significant Labs: All pertinent labs within the past 24 hours have been reviewed.    Significant Imaging: I have reviewed all pertinent imaging results/findings within the past 24 hours.      Assessment & Plan  Severe systolic congestive heart failure  Patient has Systolic (HFrEF) heart failure that is Acute on  "chronic. On presentation their CHF was decompensated. Evidence of decompensated CHF on presentation includes: shortness of breath. The etiology of their decompensation is likely dietary indiscretion. Most recent BNP and echo results are listed below.  No results for input(s): "BNP" in the last 72 hours.  Latest ECHO  Results for orders placed during the hospital encounter of 02/26/25    Echo    Interpretation Summary    Left Ventricle: There is severely reduced systolic function with a visually estimated ejection fraction of 5 - 10%. Grade III diastolic dysfunction.    Right Ventricle: Systolic function is mildly reduced.    Aortic Valve: There is mild (1+) aortic regurgitation.    Mitral Valve: There is moderate to severe (3+) regurgitation.    Tricuspid Valve: There is moderate to severe (3+) regurgitation.    Pulmonic Valve: There is mild (1+) regurgitation.    Current Heart Failure Medications  sacubitriL-valsartan 24-26 mg per tablet 1 tablet, 2 times daily, Oral  hydrALAZINE injection 10 mg, Every 6 hours PRN, Intravenous    Plan  - Monitor strict I&Os and daily weights.    - Place on telemetry  - Low sodium diet  - Place on fluid restriction of 1.5 L.   - Cardiology has been consulted  - The patient's volume status is improving but not at their baseline as indicated by shortness of breath  -       Substance abuse  Patient urged to stop using drugs    Primary hypertension  Patient's blood pressure range in the last 24 hours was: BP  Min: 104/81  Max: 119/88.The patient's inpatient anti-hypertensive regimen is listed below:  Current Antihypertensives  hydrALAZINE injection 10 mg, Every 6 hours PRN, Intravenous  metoprolol tartrate (LOPRESSOR) tablet 25 mg, 2 times daily, Oral    Plan  - BP is controlled, no changes needed to their regimen  -   GERD (gastroesophageal reflux disease)  Tums PRN    Atrial fibrillation  Patient has paroxysmal (<7 days) atrial fibrillation. Patient is currently in sinus rhythm. " [id number] Score: 2. The patients heart rate in the last 24 hours is as follows:  Pulse  Min: 69  Max: 85     Antiarrhythmics  metoprolol tartrate (LOPRESSOR) tablet 25 mg, 2 times daily, Oral    Anticoagulants  rivaroxaban tablet 20 mg, With dinner, Oral    Plan  - Replete lytes with a goal of K>4, Mg >2  - Patient is anticoagulated, see medications listed above.  - Patient's afib is currently controlled  - labs ok, rate controlled and converted to sinus with po metoprolol      VTE Risk Mitigation (From admission, onward)           Ordered     rivaroxaban tablet 20 mg  With dinner         03/17/25 1716                    Discharge Planning   CHASIDY: 3/31/2025     Code Status: Full Code   Medical Readiness for Discharge Date:   Discharge Plan A: Skilled Nursing Facility   Discharge Delays: (!) Post-Acute Set-up                    Racquel Soares MD  Department of Hospital Medicine   [facility]

## 2025-03-28 NOTE — ASSESSMENT & PLAN NOTE
Patient has paroxysmal (<7 days) atrial fibrillation. Patient is currently in sinus rhythm. [id number] Score: 2. The patients heart rate in the last 24 hours is as follows:  Pulse  Min: 69  Max: 85     Antiarrhythmics  metoprolol tartrate (LOPRESSOR) tablet 25 mg, 2 times daily, Oral    Anticoagulants  rivaroxaban tablet 20 mg, With dinner, Oral    Plan  - Replete lytes with a goal of K>4, Mg >2  - Patient is anticoagulated, see medications listed above.  - Patient's afib is currently controlled  - labs ok, rate controlled and converted to sinus with po metoprolol

## 2025-03-28 NOTE — ASSESSMENT & PLAN NOTE
Patient's blood pressure range in the last 24 hours was: BP  Min: 104/81  Max: 119/88.The patient's inpatient anti-hypertensive regimen is listed below:  Current Antihypertensives  hydrALAZINE injection 10 mg, Every 6 hours PRN, Intravenous  metoprolol tartrate (LOPRESSOR) tablet 25 mg, 2 times daily, Oral    Plan  - BP is controlled, no changes needed to their regimen  -

## 2025-03-29 PROBLEM — R04.2 HEMOPTYSIS: Status: ACTIVE | Noted: 2025-03-29

## 2025-03-29 PROBLEM — G93.41 ENCEPHALOPATHY, METABOLIC: Status: ACTIVE | Noted: 2025-03-29

## 2025-03-29 LAB
AMMONIA PLAS-MSCNC: 9 UMOL/L (ref 11–32)
ANION GAP SERPL CALC-SCNC: 22 MEQ/L (ref 2–13)
BASE EXCESS BLD CALC-SCNC: 1.9 MMOL/L (ref -2–2)
BASOPHILS # BLD AUTO: 0.02 X10(3)/MCL (ref 0.01–0.08)
BASOPHILS NFR BLD AUTO: 0.2 % (ref 0.1–1.2)
BLOOD GAS SAMPLE TYPE (OHS): ABNORMAL
BUN SERPL-MCNC: 42 MG/DL (ref 7–20)
CALCIUM SERPL-MCNC: 9.3 MG/DL (ref 8.4–10.2)
CHLORIDE SERPL-SCNC: 97 MMOL/L (ref 98–110)
CO2 SERPL-SCNC: 12 MMOL/L (ref 21–32)
COHGB MFR BLDA: 1.2 % (ref 0–1.5)
CREAT SERPL-MCNC: 1.58 MG/DL (ref 0.66–1.25)
CREAT/UREA NIT SERPL: 27 (ref 12–20)
EOSINOPHIL # BLD AUTO: 0.01 X10(3)/MCL (ref 0.04–0.54)
EOSINOPHIL NFR BLD AUTO: 0.1 % (ref 0.7–7)
ERYTHROCYTE [DISTWIDTH] IN BLOOD BY AUTOMATED COUNT: 15 %
GFR SERPLBLD CREATININE-BSD FMLA CKD-EPI: 47 ML/MIN/1.73/M2
GLUCOSE SERPL-MCNC: 42 MG/DL (ref 70–115)
HCO3 BLDA-SCNC: 25.5 MMOL/L (ref 22–26)
HCT VFR BLD AUTO: 48.1 % (ref 36–52)
HGB BLD-MCNC: 16.4 G/DL (ref 13–18)
IMM GRANULOCYTES # BLD AUTO: 0.05 X10(3)/MCL (ref 0–0.03)
IMM GRANULOCYTES NFR BLD AUTO: 0.5 % (ref 0–0.5)
INHALED O2 CONCENTRATION: 21 %
IP (OHS): 0 CMH2O
LYMPHOCYTES # BLD AUTO: 1.37 X10(3)/MCL (ref 1.32–3.57)
LYMPHOCYTES NFR BLD AUTO: 13.5 % (ref 20–55)
MCH RBC QN AUTO: 30.9 PG (ref 27–34)
MCHC RBC AUTO-ENTMCNC: 34.1 G/DL (ref 31–37)
MCV RBC AUTO: 90.6 FL (ref 79–99)
METHGB MFR BLDA: 0.1 % (ref 0–1.5)
MONOCYTES # BLD AUTO: 0.96 X10(3)/MCL (ref 0.3–0.82)
MONOCYTES NFR BLD AUTO: 9.4 % (ref 4.7–12.5)
NEUTROPHILS # BLD AUTO: 7.76 X10(3)/MCL (ref 1.78–5.38)
NEUTROPHILS NFR BLD AUTO: 76.3 % (ref 37–73)
NRBC BLD AUTO-RTO: 0.2 %
OXYGEN DEVICE BLOOD GAS (OHS): ABNORMAL
PAW @ PEAK INSP FLOW SETTING VENT: 0 CMH20
PCO2 BLDA: 39.4 MMHG (ref 35–45)
PH BLDA: 7.43 [PH] (ref 7.35–7.45)
PLATELET # BLD AUTO: 158 X10(3)/MCL (ref 140–371)
PMV BLD AUTO: 11.4 FL (ref 9.4–12.4)
PO2 BLDA: 44.9 MMHG (ref 80–105)
POCT GLUCOSE: 104 MG/DL (ref 70–110)
POCT GLUCOSE: 108 MG/DL (ref 70–110)
POCT GLUCOSE: 56 MG/DL (ref 70–110)
POCT GLUCOSE: 59 MG/DL (ref 70–110)
POCT GLUCOSE: 60 MG/DL (ref 70–110)
POCT GLUCOSE: 74 MG/DL (ref 70–110)
POTASSIUM SERPL-SCNC: 5.1 MMOL/L (ref 3.5–5.1)
RBC # BLD AUTO: 5.31 X10(6)/MCL (ref 4–6)
SAO2 % BLDA: 74.2 % (ref 95–100)
SODIUM SERPL-SCNC: 131 MMOL/L (ref 136–145)
WBC # BLD AUTO: 10.17 X10(3)/MCL (ref 4–11.5)

## 2025-03-29 PROCEDURE — 25000003 PHARM REV CODE 250: Performed by: FAMILY MEDICINE

## 2025-03-29 PROCEDURE — 36600 WITHDRAWAL OF ARTERIAL BLOOD: CPT

## 2025-03-29 PROCEDURE — 25000003 PHARM REV CODE 250: Performed by: EMERGENCY MEDICINE

## 2025-03-29 PROCEDURE — 63600175 PHARM REV CODE 636 W HCPCS: Performed by: EMERGENCY MEDICINE

## 2025-03-29 PROCEDURE — 25000003 PHARM REV CODE 250: Performed by: INTERNAL MEDICINE

## 2025-03-29 PROCEDURE — 36415 COLL VENOUS BLD VENIPUNCTURE: CPT | Performed by: FAMILY MEDICINE

## 2025-03-29 PROCEDURE — 82803 BLOOD GASES ANY COMBINATION: CPT

## 2025-03-29 PROCEDURE — 82140 ASSAY OF AMMONIA: CPT | Performed by: FAMILY MEDICINE

## 2025-03-29 PROCEDURE — 80048 BASIC METABOLIC PNL TOTAL CA: CPT | Performed by: FAMILY MEDICINE

## 2025-03-29 PROCEDURE — 25500020 PHARM REV CODE 255: Performed by: FAMILY MEDICINE

## 2025-03-29 PROCEDURE — 85025 COMPLETE CBC W/AUTO DIFF WBC: CPT | Performed by: FAMILY MEDICINE

## 2025-03-29 PROCEDURE — 21400001 HC TELEMETRY ROOM

## 2025-03-29 PROCEDURE — 99900035 HC TECH TIME PER 15 MIN (STAT)

## 2025-03-29 PROCEDURE — 94799 UNLISTED PULMONARY SVC/PX: CPT

## 2025-03-29 PROCEDURE — 27000221 HC OXYGEN, UP TO 24 HOURS

## 2025-03-29 PROCEDURE — 94761 N-INVAS EAR/PLS OXIMETRY MLT: CPT | Mod: XB

## 2025-03-29 RX ORDER — DEXTROSE MONOHYDRATE 50 MG/ML
INJECTION, SOLUTION INTRAVENOUS CONTINUOUS
Status: DISCONTINUED | OUTPATIENT
Start: 2025-03-29 | End: 2025-03-30

## 2025-03-29 RX ADMIN — FAMOTIDINE 20 MG: 20 TABLET, FILM COATED ORAL at 08:03

## 2025-03-29 RX ADMIN — ASPIRIN 81 MG: 81 TABLET ORAL at 08:03

## 2025-03-29 RX ADMIN — Medication 6 MG: at 08:03

## 2025-03-29 RX ADMIN — DEXTROSE MONOHYDRATE 25 G: 25 INJECTION, SOLUTION INTRAVENOUS at 08:03

## 2025-03-29 RX ADMIN — SACUBITRIL AND VALSARTAN 1 TABLET: 24; 26 TABLET, FILM COATED ORAL at 08:03

## 2025-03-29 RX ADMIN — METOPROLOL TARTRATE 25 MG: 25 TABLET, FILM COATED ORAL at 08:03

## 2025-03-29 RX ADMIN — CALCIUM CARBONATE (ANTACID) CHEW TAB 500 MG 500 MG: 500 CHEW TAB at 08:03

## 2025-03-29 RX ADMIN — IOHEXOL 93 ML: 350 INJECTION, SOLUTION INTRAVENOUS at 12:03

## 2025-03-29 RX ADMIN — HYDROCODONE BITARTRATE AND ACETAMINOPHEN 1 TABLET: 10; 325 TABLET ORAL at 08:03

## 2025-03-29 RX ADMIN — ATORVASTATIN CALCIUM 40 MG: 40 TABLET, FILM COATED ORAL at 08:03

## 2025-03-29 RX ADMIN — LORAZEPAM 0.5 MG: 0.5 TABLET ORAL at 01:03

## 2025-03-29 RX ADMIN — ONDANSETRON 4 MG: 2 INJECTION INTRAMUSCULAR; INTRAVENOUS at 09:03

## 2025-03-29 RX ADMIN — ESCITALOPRAM OXALATE 10 MG: 10 TABLET ORAL at 08:03

## 2025-03-29 RX ADMIN — DEXTROSE MONOHYDRATE: 50 INJECTION, SOLUTION INTRAVENOUS at 01:03

## 2025-03-29 NOTE — NURSING
Pt opened mouth for medication administration he refused to drink water he took two small sips of boost hob elevated at 90 degree angle pt refused to open mouth again

## 2025-03-29 NOTE — SUBJECTIVE & OBJECTIVE
Interval History:     Review of Systems   Constitutional:  Negative for activity change, appetite change, fatigue and fever.   Respiratory:  Negative for cough, shortness of breath and wheezing.    Cardiovascular:  Negative for chest pain and leg swelling.   Gastrointestinal:  Negative for abdominal pain, constipation, diarrhea, nausea and vomiting.   Musculoskeletal:  Positive for myalgias.   Skin:  Negative for rash and wound.   Neurological:  Positive for weakness.     Objective:     Vital Signs (Most Recent):  Temp: 97.8 °F (36.6 °C) (03/29/25 1100)  Pulse: 72 (03/29/25 1100)  Resp: 18 (03/29/25 1100)  BP: 124/88 (03/29/25 1100)  SpO2: 95 % (03/29/25 1100) Vital Signs (24h Range):  Temp:  [96.7 °F (35.9 °C)-98.1 °F (36.7 °C)] 97.8 °F (36.6 °C)  Pulse:  [56-86] 72  Resp:  [16-20] 18  SpO2:  [93 %-97 %] 95 %  BP: ()/(57-93) 124/88     Weight: 66.8 kg (147 lb 3.2 oz)  Body mass index is 23.05 kg/m².    Intake/Output Summary (Last 24 hours) at 3/29/2025 1159  Last data filed at 3/29/2025 0800  Gross per 24 hour   Intake 120 ml   Output --   Net 120 ml         Physical Exam  Constitutional:       General: He is not in acute distress.     Appearance: Normal appearance. He is ill-appearing. He is not toxic-appearing or diaphoretic.   Cardiovascular:      Rate and Rhythm: Normal rate and regular rhythm.      Pulses: Normal pulses.      Heart sounds: Normal heart sounds. No murmur heard.  Pulmonary:      Effort: Pulmonary effort is normal. No respiratory distress.      Breath sounds: Rales present. No wheezing or rhonchi.   Abdominal:      General: Abdomen is flat. There is no distension.      Palpations: Abdomen is soft.      Tenderness: There is no abdominal tenderness. There is no guarding.   Musculoskeletal:      Right lower leg: No edema.      Left lower leg: No edema.   Skin:     General: Skin is warm and dry.      Coloration: Skin is not jaundiced or pale.      Findings: No lesion or rash.   Neurological:       General: No focal deficit present.      Mental Status: He is alert. Mental status is at baseline.      Coordination: Coordination normal.   Psychiatric:         Mood and Affect: Mood normal.         Behavior: Behavior normal.               Significant Labs: All pertinent labs within the past 24 hours have been reviewed.    Significant Imaging: I have reviewed all pertinent imaging results/findings within the past 24 hours.

## 2025-03-29 NOTE — ASSESSMENT & PLAN NOTE
Multifactorial  H/o Dementia, h/o CVA see old CT head 2 [place] and ischemic disease  H/o severe CHF  Will check CT head and ammonia and ABG to rule out other acute changes

## 2025-03-29 NOTE — NURSING
Pt back from ct see avatar for iv placement pt refuses to eat drink or take anything by mouth at this time

## 2025-03-29 NOTE — ASSESSMENT & PLAN NOTE
Patient has paroxysmal (<7 days) atrial fibrillation. Patient is currently in sinus rhythm. [id number] Score: 2. The patients heart rate in the last 24 hours is as follows:  Pulse  Min: 56  Max: 86     Antiarrhythmics  metoprolol tartrate (LOPRESSOR) tablet 25 mg, 2 times daily, Oral    Anticoagulants  rivaroxaban tablet 20 mg, With dinner, Oral    Plan  - Replete lytes with a goal of K>4, Mg >2  - Patient is anticoagulated, see medications listed above.  - Patient's afib is currently controlled  - labs ok, rate controlled and converted to sinus with po metoprolol

## 2025-03-29 NOTE — ASSESSMENT & PLAN NOTE
Patient's blood pressure range in the last 24 hours was: BP  Min: 98/57  Max: 126/93.The patient's inpatient anti-hypertensive regimen is listed below:  Current Antihypertensives  hydrALAZINE injection 10 mg, Every 6 hours PRN, Intravenous  metoprolol tartrate (LOPRESSOR) tablet 25 mg, 2 times daily, Oral    Plan  - BP is controlled, no changes needed to their regimen  -

## 2025-03-29 NOTE — PROGRESS NOTES
[facility]  Progress Note    Patient Name: William South  MRN: [id number]  Patient Class: IP- Inpatient   Admission Date: 3/17/2025  Length of Stay: 11 days  Attending Physician: Yossi Hernández MD  Primary Care Provider: Bette Soliz NP        Subjective     Principal Problem:Severe systolic congestive heart failure        HPI:  69-year-old male with past medical history significant combined congestive heart failure, hypertension, anxiety/depression, tobacco abuse and polysubstance abuse who presented to ED with complaints of shortness for breath leg and feet swelling patient noted increased shortness for breath especially with exertion but that has progressing to when he is at rest over the last few days.  Patient stated he is having difficulty even was walking short distances with increasing shortness a breath.  He noticed swelling to his bilateral feet migraine up his legs.  Patient was recently seen at the end of February with the use prescribed Lasix Xarelto anticoagulation in initiated on call immediate therapy for congestive heart failure.  At that time patient was also diagnosed with a pulmonary embolism he was discharged in his medication instructed to be compliant but patient stated he is not being compliant with medications he got confused with the administration.  During initial evaluation ED he was found to have urine drug screen that was positive for cocaine.  Denies any fever or chills nausea or vomiting.          Past Medical History:   Diagnosis Date    Anxiety disorder, unspecified      CHF (congestive heart failure)      Depression      Hypertension      Hyponatremia        Overview/Hospital Course:  3/19/2025  Sob much better today   Case mgmt working on snf     3/20/2025  No new c/o  Work with PT  Sob better     Today: Patient seen and examined at bedside, and chart reviewed.   03/21/2025 patient resting comfortably awaiting placement.  He denies  any shortness of breath currently.  We will continue his current medications labs look good we will repeat some in the morning.  03/22/2025 patient doing well waiting for placement.  He complains of a little bit of acid indigestion requests some Tums.  He states his bowels are moving normally.  Labs look good repeat some labs tomorrow and prescribe him some Tums.  03/24/2025 pt awake and alert confused, agreed to [facility] does not seem to be able to live independentlky, does not take his medicine and does not go to his appoitments.  03/25/2025 awiating insurance and state approval for placement  03/26/2025 no new c/o awaiting placement  03/27/2025 pt with new onset a fib, h/o severe systolic CHF in the past known EF 10%.  PT changed to metoprolol and converted to sinus rhythm overnight HR currently 70-80s and denies chest pain or distress, he is already on DOAC due to recent PE.  Pt sitting up in chair this am, he wants to go home, however is agreeable to SNF at the nursing home.  He is still very weak.  Needs PT to resume prior independent living situation.  03/28/2025 pt confused at time, awaiting appeal with insurance as they have denied him SNF, he is not safe to go home, needs some PT to try to resume prior functional level.  03/29/2025 pt with some sputum with coughed up some blood, bright red still waiting on SNF placement, pt more confused last few days, not eating much, family brought him some outside food and he did not eat that [facility] either.    Interval History:     Review of Systems   Constitutional:  Negative for activity change, appetite change, fatigue and fever.   Respiratory:  Negative for cough, shortness of breath and wheezing.    Cardiovascular:  Negative for chest pain and leg swelling.   Gastrointestinal:  Negative for abdominal pain, constipation, diarrhea, nausea and vomiting.   Musculoskeletal:  Positive for myalgias.   Skin:  Negative for rash and wound.   Neurological:  Positive  for weakness.     Objective:     Vital Signs (Most Recent):  Temp: 97.8 °F (36.6 °C) (03/29/25 1100)  Pulse: 72 (03/29/25 1100)  Resp: 18 (03/29/25 1100)  BP: 124/88 (03/29/25 1100)  SpO2: 95 % (03/29/25 1100) Vital Signs (24h Range):  Temp:  [96.7 °F (35.9 °C)-98.1 °F (36.7 °C)] 97.8 °F (36.6 °C)  Pulse:  [56-86] 72  Resp:  [16-20] 18  SpO2:  [93 %-97 %] 95 %  BP: ()/(57-93) 124/88     Weight: 66.8 kg (147 lb 3.2 oz)  Body mass index is 23.05 kg/m².    Intake/Output Summary (Last 24 hours) at 3/29/2025 1159  Last data filed at 3/29/2025 0800  Gross per 24 hour   Intake 120 ml   Output --   Net 120 ml         Physical Exam  Constitutional:       General: He is not in acute distress.     Appearance: Normal appearance. He is ill-appearing. He is not toxic-appearing or diaphoretic.   Cardiovascular:      Rate and Rhythm: Normal rate and regular rhythm.      Pulses: Normal pulses.      Heart sounds: Normal heart sounds. No murmur heard.  Pulmonary:      Effort: Pulmonary effort is normal. No respiratory distress.      Breath sounds: Rales present. No wheezing or rhonchi.   Abdominal:      General: Abdomen is flat. There is no distension.      Palpations: Abdomen is soft.      Tenderness: There is no abdominal tenderness. There is no guarding.   Musculoskeletal:      Right lower leg: No edema.      Left lower leg: No edema.   Skin:     General: Skin is warm and dry.      Coloration: Skin is not jaundiced or pale.      Findings: No lesion or rash.   Neurological:      General: No focal deficit present.      Mental Status: He is alert. Mental status is at baseline.      Coordination: Coordination normal.   Psychiatric:         Mood and Affect: Mood normal.         Behavior: Behavior normal.               Significant Labs: All pertinent labs within the past 24 hours have been reviewed.    Significant Imaging: I have reviewed all pertinent imaging results/findings within the past 24 hours.      Assessment &  "Plan  Severe systolic congestive heart failure  Patient has Systolic (HFrEF) heart failure that is Acute on chronic. On presentation their CHF was decompensated. Evidence of decompensated CHF on presentation includes: shortness of breath. The etiology of their decompensation is likely dietary indiscretion. Most recent BNP and echo results are listed below.  No results for input(s): "BNP" in the last 72 hours.  Latest ECHO  Results for orders placed during the hospital encounter of 02/26/25    Echo    Interpretation Summary    Left Ventricle: There is severely reduced systolic function with a visually estimated ejection fraction of 5 - 10%. Grade III diastolic dysfunction.    Right Ventricle: Systolic function is mildly reduced.    Aortic Valve: There is mild (1+) aortic regurgitation.    Mitral Valve: There is moderate to severe (3+) regurgitation.    Tricuspid Valve: There is moderate to severe (3+) regurgitation.    Pulmonic Valve: There is mild (1+) regurgitation.    Current Heart Failure Medications  sacubitriL-valsartan 24-26 mg per tablet 1 tablet, 2 times daily, Oral  hydrALAZINE injection 10 mg, Every 6 hours PRN, Intravenous    Plan  - Monitor strict I&Os and daily weights.    - Place on telemetry  - Low sodium diet  - Place on fluid restriction of 1.5 L.   - Cardiology has been consulted  - The patient's volume status is improving but not at their baseline as indicated by shortness of breath  -       Substance abuse  Patient urged to stop using drugs    Primary hypertension  Patient's blood pressure range in the last 24 hours was: BP  Min: 98/57  Max: 126/93.The patient's inpatient anti-hypertensive regimen is listed below:  Current Antihypertensives  hydrALAZINE injection 10 mg, Every 6 hours PRN, Intravenous  metoprolol tartrate (LOPRESSOR) tablet 25 mg, 2 times daily, Oral    Plan  - BP is controlled, no changes needed to their regimen  -   GERD (gastroesophageal reflux disease)  Tums PRN    Atrial " fibrillation  Patient has paroxysmal (<7 days) atrial fibrillation. Patient is currently in sinus rhythm. [id number] Score: 2. The patients heart rate in the last 24 hours is as follows:  Pulse  Min: 56  Max: 86     Antiarrhythmics  metoprolol tartrate (LOPRESSOR) tablet 25 mg, 2 times daily, Oral    Anticoagulants  rivaroxaban tablet 20 mg, With dinner, Oral    Plan  - Replete lytes with a goal of K>4, Mg >2  - Patient is anticoagulated, see medications listed above.  - Patient's afib is currently controlled  - labs ok, rate controlled and converted to sinus with po metoprolol      Hemoptysis    Encephalopathy, metabolic  Multifactorial  H/o Dementia, h/o CVA see old CT head 2 [place] and ischemic disease  H/o severe CHF  Will check CT head and ammonia and ABG to rule out other acute changes      VTE Risk Mitigation (From admission, onward)      None            Discharge Planning   CHASIDY: 3/31/2025     Code Status: Full Code   Medical Readiness for Discharge Date:   Discharge Plan A: Skilled Nursing Facility   Discharge Delays: (!) Post-Acute Set-up                    Racquel Soares MD  Department of Hospital Medicine   [facility]

## 2025-03-30 PROBLEM — E87.5 HYPERKALEMIA: Status: ACTIVE | Noted: 2025-03-30

## 2025-03-30 LAB
ANION GAP SERPL CALC-SCNC: 23 MEQ/L (ref 2–13)
BASE EXCESS BLD CALC-SCNC: 0.2 MMOL/L (ref -2–2)
BASOPHILS # BLD AUTO: 0 X10(3)/MCL (ref 0.01–0.08)
BASOPHILS NFR BLD AUTO: 0 % (ref 0.1–1.2)
BLOOD GAS SAMPLE TYPE (OHS): ABNORMAL
BUN SERPL-MCNC: 51 MG/DL (ref 7–20)
CALCIUM SERPL-MCNC: 9.3 MG/DL (ref 8.4–10.2)
CHLORIDE SERPL-SCNC: 95 MMOL/L (ref 98–110)
CO2 SERPL-SCNC: 12 MMOL/L (ref 21–32)
COHGB MFR BLDA: 1 % (ref 0–1.5)
CREAT SERPL-MCNC: 1.73 MG/DL (ref 0.66–1.25)
CREAT/UREA NIT SERPL: 29 (ref 12–20)
EOSINOPHIL # BLD AUTO: 0.01 X10(3)/MCL (ref 0.04–0.54)
EOSINOPHIL NFR BLD AUTO: 0.1 % (ref 0.7–7)
ERYTHROCYTE [DISTWIDTH] IN BLOOD BY AUTOMATED COUNT: 15.1 %
GFR SERPLBLD CREATININE-BSD FMLA CKD-EPI: 42 ML/MIN/1.73/M2
GLUCOSE SERPL-MCNC: 100 MG/DL (ref 70–115)
HCO3 BLDA-SCNC: 24.6 MMOL/L (ref 22–26)
HCT VFR BLD AUTO: 47.3 % (ref 36–52)
HGB BLD-MCNC: 15.9 G/DL (ref 13–18)
IMM GRANULOCYTES # BLD AUTO: 0.04 X10(3)/MCL (ref 0–0.03)
IMM GRANULOCYTES NFR BLD AUTO: 0.4 % (ref 0–0.5)
INHALED O2 CONCENTRATION: 46 %
IP (OHS): 0 CMH2O
LACTATE SERPL-SCNC: 8.6 MMOL/L (ref 0.4–2)
LPM (OHS): 5
LYMPHOCYTES # BLD AUTO: 1.21 X10(3)/MCL (ref 1.32–3.57)
LYMPHOCYTES NFR BLD AUTO: 12.1 % (ref 20–55)
MAGNESIUM SERPL-MCNC: 2.5 MG/DL (ref 1.8–2.4)
MCH RBC QN AUTO: 30.5 PG (ref 27–34)
MCHC RBC AUTO-ENTMCNC: 33.6 G/DL (ref 31–37)
MCV RBC AUTO: 90.6 FL (ref 79–99)
METHGB MFR BLDA: <0 % (ref 0–1.5)
MONOCYTES # BLD AUTO: 0.85 X10(3)/MCL (ref 0.3–0.82)
MONOCYTES NFR BLD AUTO: 8.5 % (ref 4.7–12.5)
NEUTROPHILS # BLD AUTO: 7.88 X10(3)/MCL (ref 1.78–5.38)
NEUTROPHILS NFR BLD AUTO: 78.9 % (ref 37–73)
NRBC BLD AUTO-RTO: 0 %
OXYGEN DEVICE BLOOD GAS (OHS): ABNORMAL
PAW @ PEAK INSP FLOW SETTING VENT: 0 CMH20
PCO2 BLDA: 17.1 MMHG (ref 35–45)
PH BLDA: 7.63 [PH] (ref 7.35–7.45)
PLATELET # BLD AUTO: 184 X10(3)/MCL (ref 140–371)
PMV BLD AUTO: 11.4 FL (ref 9.4–12.4)
PO2 BLDA: 123 MMHG (ref 80–105)
POCT GLUCOSE: 104 MG/DL (ref 70–110)
POCT GLUCOSE: 107 MG/DL (ref 70–110)
POCT GLUCOSE: 193 MG/DL (ref 70–110)
POCT GLUCOSE: 45 MG/DL (ref 70–110)
POCT GLUCOSE: 46 MG/DL (ref 70–110)
POCT GLUCOSE: 50 MG/DL (ref 70–110)
POCT GLUCOSE: 68 MG/DL (ref 70–110)
POCT GLUCOSE: 79 MG/DL (ref 70–110)
POCT GLUCOSE: 83 MG/DL (ref 70–110)
POTASSIUM SERPL-SCNC: 5.2 MMOL/L (ref 3.5–5.1)
RBC # BLD AUTO: 5.22 X10(6)/MCL (ref 4–6)
SAO2 % BLDA: 99.6 % (ref 95–100)
SODIUM SERPL-SCNC: 130 MMOL/L (ref 136–145)
WBC # BLD AUTO: 9.99 X10(3)/MCL (ref 4–11.5)

## 2025-03-30 PROCEDURE — C1751 CATH, INF, PER/CENT/MIDLINE: HCPCS

## 2025-03-30 PROCEDURE — 36569 INSJ PICC 5 YR+ W/O IMAGING: CPT

## 2025-03-30 PROCEDURE — 02HV33Z INSERTION OF INFUSION DEVICE INTO SUPERIOR VENA CAVA, PERCUTANEOUS APPROACH: ICD-10-PCS | Performed by: FAMILY MEDICINE

## 2025-03-30 PROCEDURE — 36415 COLL VENOUS BLD VENIPUNCTURE: CPT | Performed by: FAMILY MEDICINE

## 2025-03-30 PROCEDURE — 83605 ASSAY OF LACTIC ACID: CPT | Performed by: FAMILY MEDICINE

## 2025-03-30 PROCEDURE — 21400001 HC TELEMETRY ROOM

## 2025-03-30 PROCEDURE — 25000003 PHARM REV CODE 250: Performed by: FAMILY MEDICINE

## 2025-03-30 PROCEDURE — 99900035 HC TECH TIME PER 15 MIN (STAT)

## 2025-03-30 PROCEDURE — 27000221 HC OXYGEN, UP TO 24 HOURS

## 2025-03-30 PROCEDURE — 25000003 PHARM REV CODE 250: Performed by: INTERNAL MEDICINE

## 2025-03-30 PROCEDURE — 51701 INSERT BLADDER CATHETER: CPT

## 2025-03-30 PROCEDURE — 82803 BLOOD GASES ANY COMBINATION: CPT

## 2025-03-30 PROCEDURE — 80048 BASIC METABOLIC PNL TOTAL CA: CPT | Performed by: FAMILY MEDICINE

## 2025-03-30 PROCEDURE — 63600175 PHARM REV CODE 636 W HCPCS: Performed by: FAMILY MEDICINE

## 2025-03-30 PROCEDURE — 85025 COMPLETE CBC W/AUTO DIFF WBC: CPT | Performed by: FAMILY MEDICINE

## 2025-03-30 PROCEDURE — 94761 N-INVAS EAR/PLS OXIMETRY MLT: CPT | Mod: XB

## 2025-03-30 PROCEDURE — 36600 WITHDRAWAL OF ARTERIAL BLOOD: CPT

## 2025-03-30 PROCEDURE — B4185 PARENTERAL SOL 10 GM LIPIDS: HCPCS | Performed by: FAMILY MEDICINE

## 2025-03-30 PROCEDURE — 83735 ASSAY OF MAGNESIUM: CPT | Performed by: FAMILY MEDICINE

## 2025-03-30 RX ORDER — CEFTRIAXONE 1 G/1
1 INJECTION, POWDER, FOR SOLUTION INTRAMUSCULAR; INTRAVENOUS
Status: DISCONTINUED | OUTPATIENT
Start: 2025-03-30 | End: 2025-03-30

## 2025-03-30 RX ORDER — SODIUM CHLORIDE 9 MG/ML
INJECTION, SOLUTION INTRAVENOUS ONCE
Status: COMPLETED | OUTPATIENT
Start: 2025-03-30 | End: 2025-03-30

## 2025-03-30 RX ORDER — SODIUM CHLORIDE 9 MG/ML
INJECTION, SOLUTION INTRAVENOUS CONTINUOUS
Status: DISCONTINUED | OUTPATIENT
Start: 2025-03-30 | End: 2025-03-30

## 2025-03-30 RX ADMIN — LORAZEPAM 0.5 MG: 0.5 TABLET ORAL at 04:03

## 2025-03-30 RX ADMIN — ESCITALOPRAM OXALATE 10 MG: 10 TABLET ORAL at 08:03

## 2025-03-30 RX ADMIN — SODIUM BICARBONATE: 84 INJECTION, SOLUTION INTRAVENOUS at 02:03

## 2025-03-30 RX ADMIN — PIPERACILLIN AND TAZOBACTAM 4.5 G: 4; .5 INJECTION, POWDER, FOR SOLUTION INTRAVENOUS; PARENTERAL at 11:03

## 2025-03-30 RX ADMIN — LORAZEPAM 0.5 MG: 0.5 TABLET ORAL at 10:03

## 2025-03-30 RX ADMIN — FAMOTIDINE 20 MG: 20 TABLET, FILM COATED ORAL at 08:03

## 2025-03-30 RX ADMIN — PIPERACILLIN AND TAZOBACTAM 4.5 G: 4; .5 INJECTION, POWDER, FOR SOLUTION INTRAVENOUS; PARENTERAL at 01:03

## 2025-03-30 RX ADMIN — CALCIUM CARBONATE (ANTACID) CHEW TAB 500 MG 500 MG: 500 CHEW TAB at 12:03

## 2025-03-30 RX ADMIN — METOPROLOL TARTRATE 25 MG: 25 TABLET, FILM COATED ORAL at 08:03

## 2025-03-30 RX ADMIN — LEUCINE, PHENYLALANINE, LYSINE, METHIONINE, ISOLEUCINE, VALINE, HISTIDINE, THREONINE, TRYPTOPHAN, ALANINE, GLYCINE, ARGININE, PROLINE, SERINE, TYROSINE, SODIUM ACETATE, DIBASIC POTASSIUM PHOSPHATE, MAGNESIUM CHLORIDE, SODIUM CHLORIDE, CALCIUM CHLORIDE, DEXTROSE
880; 489; 33; 5; 438; 204; 255; 311; 247; 51; 170; 238; 261; 289; 213; 297; 77; 179; 77; 17; 247 INJECTION INTRAVENOUS at 06:03

## 2025-03-30 RX ADMIN — SOYBEAN OIL 250 ML: 20 INJECTION, SOLUTION INTRAVENOUS at 03:03

## 2025-03-30 RX ADMIN — ATORVASTATIN CALCIUM 40 MG: 40 TABLET, FILM COATED ORAL at 09:03

## 2025-03-30 RX ADMIN — RIVAROXABAN 20 MG: 20 TABLET, FILM COATED ORAL at 01:03

## 2025-03-30 RX ADMIN — SACUBITRIL AND VALSARTAN 1 TABLET: 24; 26 TABLET, FILM COATED ORAL at 08:03

## 2025-03-30 RX ADMIN — CALCIUM CARBONATE (ANTACID) CHEW TAB 500 MG 500 MG: 500 CHEW TAB at 09:03

## 2025-03-30 RX ADMIN — SODIUM CHLORIDE: 9 INJECTION, SOLUTION INTRAVENOUS at 03:03

## 2025-03-30 RX ADMIN — METOPROLOL TARTRATE 25 MG: 25 TABLET, FILM COATED ORAL at 09:03

## 2025-03-30 RX ADMIN — CALCIUM CARBONATE (ANTACID) CHEW TAB 500 MG 500 MG: 500 CHEW TAB at 04:03

## 2025-03-30 RX ADMIN — CALCIUM CARBONATE (ANTACID) CHEW TAB 500 MG 500 MG: 500 CHEW TAB at 08:03

## 2025-03-30 RX ADMIN — SACUBITRIL AND VALSARTAN 1 TABLET: 24; 26 TABLET, FILM COATED ORAL at 09:03

## 2025-03-30 NOTE — ASSESSMENT & PLAN NOTE
Patient has paroxysmal (<7 days) atrial fibrillation. Patient is currently in sinus rhythm. [id number] Score: 2. The patients heart rate in the last 24 hours is as follows:  Pulse  Min: 68  Max: 80     Antiarrhythmics  metoprolol tartrate (LOPRESSOR) tablet 25 mg, 2 times daily, Oral    Anticoagulants  rivaroxaban tablet 20 mg, With dinner, Oral    Plan  - Replete lytes with a goal of K>4, Mg >2  - Patient is anticoagulated, see medications listed above.  - Patient's afib is currently controlled  - labs ok, rate controlled and converted to sinus with po metoprolol

## 2025-03-30 NOTE — ASSESSMENT & PLAN NOTE
Held DOAC due to some mild hemoptysis  yesterday  H&H stable and none sice, will resume today  Pt with hypoxia and recent h/o PE

## 2025-03-30 NOTE — ASSESSMENT & PLAN NOTE
Multifactorial  H/o Dementia, h/o CVA see old CT head 2 [place] and ischemic disease  H/o severe CHF  CT shows severe ischemic disease, no acute changes

## 2025-03-30 NOTE — PROGRESS NOTES
[facility]  Progress Note    Patient Name: William South  MRN: [id number]  Patient Class: IP- Inpatient   Admission Date: 3/17/2025  Length of Stay: 12 days  Attending Physician: Yossi Hernández MD  Primary Care Provider: Bette Soliz NP        Subjective     Principal Problem:Severe systolic congestive heart failure        HPI:  69-year-old male with past medical history significant combined congestive heart failure, hypertension, anxiety/depression, tobacco abuse and polysubstance abuse who presented to ED with complaints of shortness for breath leg and feet swelling patient noted increased shortness for breath especially with exertion but that has progressing to when he is at rest over the last few days.  Patient stated he is having difficulty even was walking short distances with increasing shortness a breath.  He noticed swelling to his bilateral feet migraine up his legs.  Patient was recently seen at the end of February with the use prescribed Lasix Xarelto anticoagulation in initiated on call immediate therapy for congestive heart failure.  At that time patient was also diagnosed with a pulmonary embolism he was discharged in his medication instructed to be compliant but patient stated he is not being compliant with medications he got confused with the administration.  During initial evaluation ED he was found to have urine drug screen that was positive for cocaine.  Denies any fever or chills nausea or vomiting.          Past Medical History:   Diagnosis Date    Anxiety disorder, unspecified      CHF (congestive heart failure)      Depression      Hypertension      Hyponatremia        Overview/Hospital Course:  3/19/2025  Sob much better today   Case mgmt working on snf     3/20/2025  No new c/o  Work with PT  Sob better     Today: Patient seen and examined at bedside, and chart reviewed.   03/21/2025 patient resting comfortably awaiting placement.  He denies  any shortness of breath currently.  We will continue his current medications labs look good we will repeat some in the morning.  03/22/2025 patient doing well waiting for placement.  He complains of a little bit of acid indigestion requests some Tums.  He states his bowels are moving normally.  Labs look good repeat some labs tomorrow and prescribe him some Tums.  03/24/2025 pt awake and alert confused, agreed to [facility] does not seem to be able to live independentlky, does not take his medicine and does not go to his appoitments.  03/25/2025 awiating insurance and state approval for placement  03/26/2025 no new c/o awaiting placement  03/27/2025 pt with new onset a fib, h/o severe systolic CHF in the past known EF 10%.  PT changed to metoprolol and converted to sinus rhythm overnight HR currently 70-80s and denies chest pain or distress, he is already on DOAC due to recent PE.  Pt sitting up in chair this am, he wants to go home, however is agreeable to SNF at the nursing home.  He is still very weak.  Needs PT to resume prior independent living situation.  03/28/2025 pt confused at time, awaiting appeal with insurance as they have denied him SNF, he is not safe to go home, needs some PT to try to resume prior functional level.  03/29/2025 pt with some sputum with coughed up some blood, bright red still waiting on SNF placement, pt more confused last few days, not eating much, family brought him some outside food and he did not eat that [facility] either.  03/30/2025 pt with worsening mental status yesterday, ABG showed some hypoxia PAO2 was 44, placed on 5L via oximizer, CT negative for mass, possible bilateral infiltrates, started on iv zosyn, pt still sleepy, minimally responsive, repeat CT shows severe ischemic disease. Echo shows ef 5-10% with severe valvular heart disease and grade 3 diastolic dysfunction.  He is not eating much, Started D5 yesterrday due to low blood sugars, encourage po, but he is not  eating any better today.  Will add clinimix.    Interval History:     Review of Systems   Constitutional:  Positive for appetite change and fatigue. Negative for activity change and fever.   Respiratory:  Negative for cough, shortness of breath and wheezing.    Cardiovascular:  Negative for chest pain and leg swelling.   Gastrointestinal:  Negative for abdominal pain, constipation, diarrhea, nausea and vomiting.   Musculoskeletal:  Positive for myalgias.   Skin:  Negative for rash and wound.   Neurological:  Positive for weakness.   Psychiatric/Behavioral:  Positive for confusion.      Objective:     Vital Signs (Most Recent):  Temp: 96.3 °F (35.7 °C) (03/30/25 1100)  Pulse: 70 (03/30/25 1100)  Resp: (!) 24 (03/30/25 1100)  BP: (!) 122/97 (03/30/25 1100)  SpO2: 96 % (03/30/25 1100) Vital Signs (24h Range):  Temp:  [96.3 °F (35.7 °C)-98.3 °F (36.8 °C)] 96.3 °F (35.7 °C)  Pulse:  [68-80] 70  Resp:  [20-24] 24  SpO2:  [92 %-97 %] 96 %  BP: (122-134)/(89-99) 122/97     Weight: 66.8 kg (147 lb 3.2 oz)  Body mass index is 23.05 kg/m².    Intake/Output Summary (Last 24 hours) at 3/30/2025 1329  Last data filed at 3/30/2025 1300  Gross per 24 hour   Intake 1040.83 ml   Output 350 ml   Net 690.83 ml         Physical Exam  Constitutional:       General: He is not in acute distress.     Appearance: Normal appearance. He is ill-appearing. He is not toxic-appearing or diaphoretic.   Cardiovascular:      Rate and Rhythm: Normal rate and regular rhythm.      Pulses: Normal pulses.      Heart sounds: Normal heart sounds. No murmur heard.  Pulmonary:      Effort: Pulmonary effort is normal. No respiratory distress.      Breath sounds: Rales present. No wheezing or rhonchi.   Abdominal:      General: Abdomen is flat.      Palpations: Abdomen is soft.      Tenderness: There is no abdominal tenderness. There is no guarding.   Musculoskeletal:      Right lower leg: No edema.      Left lower leg: No edema.   Skin:     General: Skin is  "warm and dry.      Coloration: Skin is not jaundiced or pale.      Findings: No lesion or rash.   Neurological:      General: No focal deficit present.      Mental Status: He is alert. He is disoriented.      Comments: Less responsive, eyes open, no distress, not answering any questions and minimal reponsiveness   Psychiatric:         Mood and Affect: Mood normal.               Significant Labs: All pertinent labs within the past 24 hours have been reviewed.    Significant Imaging: I have reviewed all pertinent imaging results/findings within the past 24 hours.      Assessment & Plan  Severe systolic congestive heart failure  Patient has Systolic (HFrEF) heart failure that is Acute on chronic. On presentation their CHF was decompensated. Evidence of decompensated CHF on presentation includes: shortness of breath. The etiology of their decompensation is likely dietary indiscretion. Most recent BNP and echo results are listed below.  No results for input(s): "BNP" in the last 72 hours.  Latest ECHO  Results for orders placed during the hospital encounter of 02/26/25    Echo    Interpretation Summary    Left Ventricle: There is severely reduced systolic function with a visually estimated ejection fraction of 5 - 10%. Grade III diastolic dysfunction.    Right Ventricle: Systolic function is mildly reduced.    Aortic Valve: There is mild (1+) aortic regurgitation.    Mitral Valve: There is moderate to severe (3+) regurgitation.    Tricuspid Valve: There is moderate to severe (3+) regurgitation.    Pulmonic Valve: There is mild (1+) regurgitation.    Current Heart Failure Medications  sacubitriL-valsartan 24-26 mg per tablet 1 tablet, 2 times daily, Oral  hydrALAZINE injection 10 mg, Every 6 hours PRN, Intravenous    Plan  - Monitor strict I&Os and daily weights.    - continue telemetry  - Low sodium diet  - [place] on fluid restriction of 1.5 L.   - Cardiology has been following  - The patient's volume status is improving " but not at their baseline as indicated by shortness of breath  - very poor prognosis, recommended hospice and DNR, family is not in agreement      Substance abuse  Patient urged to stop using drugs    Primary hypertension  Patient's blood pressure range in the last 24 hours was: BP  Min: 122/97  Max: 134/94.The patient's inpatient anti-hypertensive regimen is listed below:  Current Antihypertensives  hydrALAZINE injection 10 mg, Every 6 hours PRN, Intravenous  metoprolol tartrate (LOPRESSOR) tablet 25 mg, 2 times daily, Oral    Plan  - BP is controlled, no changes needed to their regimen  -   GERD (gastroesophageal reflux disease)  Tums PRN    Atrial fibrillation  Patient has paroxysmal (<7 days) atrial fibrillation. Patient is currently in sinus rhythm. [id number] Score: 2. The patients heart rate in the last 24 hours is as follows:  Pulse  Min: 68  Max: 80     Antiarrhythmics  metoprolol tartrate (LOPRESSOR) tablet 25 mg, 2 times daily, Oral    Anticoagulants  rivaroxaban tablet 20 mg, With dinner, Oral    Plan  - Replete lytes with a goal of K>4, Mg >2  - Patient is anticoagulated, see medications listed above.  - Patient's afib is currently controlled  - labs ok, rate controlled and converted to sinus with po metoprolol      Hemoptysis  Held DOAC due to some mild hemoptysis  yesterday  H&H stable and none sice, will resume today  Pt with hypoxia and recent h/o PE    Encephalopathy, metabolic  Multifactorial  H/o Dementia, h/o CVA see old CT head 2 [place] and ischemic disease  H/o severe CHF  CT shows severe ischemic disease, no acute changes    Hyperkalemia  Hyperkalemia is likely due to    .The patients most recent potassium results are listed below.  Recent Labs     03/28/25  0455 03/29/25  0505 03/30/25  0415   K 4.4 5.1 5.2*     Plan  - Monitor for arrhythmias with EKG and/or continuous telemetry.   - Treat the hyperkalemia with Potassium Binders and   .   - Monitor potassium: Daily  - The patient's  hyperkalemia is stable          VTE Risk Mitigation (From admission, onward)           Ordered     rivaroxaban tablet 20 mg  With dinner         03/30/25 1331                    Discharge Planning   CHASIDY: 3/31/2025     Code Status: Full Code   Medical Readiness for Discharge Date:   Discharge Plan A: Skilled Nursing Facility   Discharge Delays: (!) Post-Acute Set-up        Very poor prognosis, EF 5%            Racquel Soares MD  Department of Hospital Medicine   [facility]Surg

## 2025-03-30 NOTE — NURSING
Pt not as agitated not combative at this time able to connect back to telemetry. Pt still trying to get out of bed

## 2025-03-30 NOTE — SUBJECTIVE & OBJECTIVE
Interval History:     Review of Systems   Constitutional:  Positive for appetite change and fatigue. Negative for activity change and fever.   Respiratory:  Negative for cough, shortness of breath and wheezing.    Cardiovascular:  Negative for chest pain and leg swelling.   Gastrointestinal:  Negative for abdominal pain, constipation, diarrhea, nausea and vomiting.   Musculoskeletal:  Positive for myalgias.   Skin:  Negative for rash and wound.   Neurological:  Positive for weakness.   Psychiatric/Behavioral:  Positive for confusion.      Objective:     Vital Signs (Most Recent):  Temp: 96.3 °F (35.7 °C) (03/30/25 1100)  Pulse: 70 (03/30/25 1100)  Resp: (!) 24 (03/30/25 1100)  BP: (!) 122/97 (03/30/25 1100)  SpO2: 96 % (03/30/25 1100) Vital Signs (24h Range):  Temp:  [96.3 °F (35.7 °C)-98.3 °F (36.8 °C)] 96.3 °F (35.7 °C)  Pulse:  [68-80] 70  Resp:  [20-24] 24  SpO2:  [92 %-97 %] 96 %  BP: (122-134)/(89-99) 122/97     Weight: 66.8 kg (147 lb 3.2 oz)  Body mass index is 23.05 kg/m².    Intake/Output Summary (Last 24 hours) at 3/30/2025 1329  Last data filed at 3/30/2025 1300  Gross per 24 hour   Intake 1040.83 ml   Output 350 ml   Net 690.83 ml         Physical Exam  Constitutional:       General: He is not in acute distress.     Appearance: Normal appearance. He is ill-appearing. He is not toxic-appearing or diaphoretic.   Cardiovascular:      Rate and Rhythm: Normal rate and regular rhythm.      Pulses: Normal pulses.      Heart sounds: Normal heart sounds. No murmur heard.  Pulmonary:      Effort: Pulmonary effort is normal. No respiratory distress.      Breath sounds: Rales present. No wheezing or rhonchi.   Abdominal:      General: Abdomen is flat.      Palpations: Abdomen is soft.      Tenderness: There is no abdominal tenderness. There is no guarding.   Musculoskeletal:      Right lower leg: No edema.      Left lower leg: No edema.   Skin:     General: Skin is warm and dry.      Coloration: Skin is not  jaundiced or pale.      Findings: No lesion or rash.   Neurological:      General: No focal deficit present.      Mental Status: He is alert. He is disoriented.      Comments: Less responsive, eyes open, no distress, not answering any questions and minimal reponsiveness   Psychiatric:         Mood and Affect: Mood normal.               Significant Labs: All pertinent labs within the past 24 hours have been reviewed.    Significant Imaging: I have reviewed all pertinent imaging results/findings within the past 24 hours.

## 2025-03-30 NOTE — ASSESSMENT & PLAN NOTE
Hyperkalemia is likely due to  .The patients most recent potassium results are listed below.  Recent Labs     03/28/25  0455 03/29/25  0505 03/30/25  0415   K 4.4 5.1 5.2*     Plan  - Monitor for arrhythmias with EKG and/or continuous telemetry.   - Treat the hyperkalemia with Potassium Binders and  .   - Monitor potassium: Daily  - The patient's hyperkalemia is stable

## 2025-03-30 NOTE — ASSESSMENT & PLAN NOTE
Patient's blood pressure range in the last 24 hours was: BP  Min: 122/97  Max: 134/94.The patient's inpatient anti-hypertensive regimen is listed below:  Current Antihypertensives  hydrALAZINE injection 10 mg, Every 6 hours PRN, Intravenous  metoprolol tartrate (LOPRESSOR) tablet 25 mg, 2 times daily, Oral    Plan  - BP is controlled, no changes needed to their regimen  -

## 2025-03-30 NOTE — PLAN OF CARE
Problem: Adult Inpatient Plan of Care  Goal: Plan of Care Review  Outcome: Progressing  Goal: Patient-Specific Goal (Individualized)  Outcome: Progressing  Goal: Absence of Hospital-Acquired Illness or Injury  Outcome: Progressing  Goal: Optimal Comfort and Wellbeing  Outcome: Progressing  Goal: Readiness for Transition of Care  Outcome: Progressing     Problem: Fall Injury Risk  Goal: Absence of Fall and Fall-Related Injury  Outcome: Progressing     Problem: Comorbidity Management  Goal: Maintenance of Heart Failure Symptom Control  Outcome: Progressing  Goal: Blood Pressure in Desired Range  Outcome: Progressing     Problem: Heart Failure  Goal: Optimal Coping  Outcome: Progressing  Goal: Optimal Cardiac Output  Outcome: Progressing  Goal: Stable Heart Rate and Rhythm  Outcome: Progressing  Goal: Optimal Functional Ability  Outcome: Progressing  Goal: Fluid and Electrolyte Balance  Outcome: Progressing  Goal: Improved Oral Intake  Outcome: Progressing  Goal: Effective Oxygenation and Ventilation  Outcome: Progressing  Goal: Effective Breathing Pattern During Sleep  Outcome: Progressing     Problem: Excessive Substance Use  Goal: Optimized Energy Level (Excessive Substance Use)  Outcome: Progressing  Goal: Improved Behavioral Control (Excessive Substance Use)  Outcome: Progressing  Goal: Increased Participation and Engagement (Excessive Substance Use)  Outcome: Progressing  Goal: Improved Physiologic Symptoms (Excessive Substance Use)  Outcome: Progressing  Goal: Enhanced Social, Occupational or Functional Skills (Excessive Substance Use)  Outcome: Progressing     Problem: Skin Injury Risk Increased  Goal: Skin Health and Integrity  Outcome: Progressing

## 2025-03-30 NOTE — PROGRESS NOTES
"Clinical Pharmacy TPN Monitoring and Adjustment     Day # 1 of PPN     Diet Ordered:    Regular Diet as medically appropriate;  Current no PO intake    Indication:         decreased PO intake with AMS; Continue to encourage PO intake. Nutrition Risk/Follow-Up: high (follow-up in 1-4 days)     Line access:          peripheral       Dietitian and nutrition notes/recommendations:    Continue Regular Diet as medically appropriate to encourage PO intake.   Continue Boost Very High Calorie- Vanilla 1x/day providing (530 kcal, 22 gm pro) to aid in nutritional intake.    Recommend consider folate and thiamine supplementation.   Recommend consider appetite stimulant due to decreased PO intake with AMS.  Recommend consider PEG vs NG tube feeds of Isosource 1.2 @ 10 ml/hr and slowly increase to goal rate 50 ml/hr (1725 kcal, 78 gm pro, 879 ml H2O) with FWF @ 35 ml/hr.  Continue to encourage PO intake, assist with feeds, and honor patient preferences.    Current TPN and lipid orders:               50 mL/hour of 4.25/5% Clinimix-E.       Notable Labs:   Daily BMP, Mg, Phos.     BMP:   Lab Results   Component Value Date    K 5.2 (H) 03/30/2025    CREATININE 1.73 (H) 03/30/2025      Replete K if < 3.5 mg/dL prior to initiating new TPN.     Phosphorous:   Lab Results   Component Value Date    CALCIUM 9.3 03/30/2025       Total Bili:   Lab Results   Component Value Date    BILITOT 1.2 (H) 03/23/2025       TGs:   No results found for: "CRP"  Lab Results   Component Value Date    TRIG 81 02/07/2024       Plan:             Clinimix-E  4.25 / 5  at  50ml/hr                "

## 2025-03-30 NOTE — NURSING
Called respiratory spoke with arnaldo asked for respiratory to asess pt and o2 administration rate due to apnea at times respirations are 22

## 2025-03-30 NOTE — NURSING
Iv infiltrated unablle to start tpn tried to startnew line unsucessful x 2 using aseptic technique dr waite notified of poct of 50 and below start given apple juice pt refused to drink anymore than one container tpn and revaluate bicarb was stopped tpn and fat emulsion were resumed on iv in right fa house supervisor was notified of need for picc line placement

## 2025-03-30 NOTE — NURSING
Pt at foot of bed iv lines stretched patient assisted back to bed changed gown and bedding pt contined to try to get out of bed he got increasingly agitated and swung and missed with closed fist x2 given ativan prn for agitation and combativeness sr up x 4 bed alarm set lights off door left open

## 2025-03-30 NOTE — ASSESSMENT & PLAN NOTE
"Patient has Systolic (HFrEF) heart failure that is Acute on chronic. On presentation their CHF was decompensated. Evidence of decompensated CHF on presentation includes: shortness of breath. The etiology of their decompensation is likely dietary indiscretion. Most recent BNP and echo results are listed below.  No results for input(s): "BNP" in the last 72 hours.  Latest ECHO  Results for orders placed during the hospital encounter of 02/26/25    Echo    Interpretation Summary    Left Ventricle: There is severely reduced systolic function with a visually estimated ejection fraction of 5 - 10%. Grade III diastolic dysfunction.    Right Ventricle: Systolic function is mildly reduced.    Aortic Valve: There is mild (1+) aortic regurgitation.    Mitral Valve: There is moderate to severe (3+) regurgitation.    Tricuspid Valve: There is moderate to severe (3+) regurgitation.    Pulmonic Valve: There is mild (1+) regurgitation.    Current Heart Failure Medications  sacubitriL-valsartan 24-26 mg per tablet 1 tablet, 2 times daily, Oral  hydrALAZINE injection 10 mg, Every 6 hours PRN, Intravenous    Plan  - Monitor strict I&Os and daily weights.    - continue telemetry  - Low sodium diet  - Placde on fluid restriction of 1.5 L.   - Cardiology has been following  - The patient's volume status is improving but not at their baseline as indicated by shortness of breath  - very poor prognosis, recommended hospice and DNR, family is not in agreement      "

## 2025-03-31 LAB
ANION GAP SERPL CALC-SCNC: 8 MEQ/L (ref 2–13)
BASOPHILS # BLD AUTO: 0 X10(3)/MCL (ref 0.01–0.08)
BASOPHILS NFR BLD AUTO: 0 % (ref 0.1–1.2)
BUN SERPL-MCNC: 62 MG/DL (ref 7–20)
CALCIUM SERPL-MCNC: 8.4 MG/DL (ref 8.4–10.2)
CHLORIDE SERPL-SCNC: 88 MMOL/L (ref 98–110)
CO2 SERPL-SCNC: 31 MMOL/L (ref 21–32)
CREAT SERPL-MCNC: 1.73 MG/DL (ref 0.66–1.25)
CREAT/UREA NIT SERPL: 36 (ref 12–20)
EOSINOPHIL # BLD AUTO: 0 X10(3)/MCL (ref 0.04–0.54)
EOSINOPHIL NFR BLD AUTO: 0 % (ref 0.7–7)
ERYTHROCYTE [DISTWIDTH] IN BLOOD BY AUTOMATED COUNT: 14.9 %
GFR SERPLBLD CREATININE-BSD FMLA CKD-EPI: 42 ML/MIN/1.73/M2
GLUCOSE SERPL-MCNC: 113 MG/DL (ref 70–115)
HCT VFR BLD AUTO: 38.5 % (ref 36–52)
HGB BLD-MCNC: 13.5 G/DL (ref 13–18)
IMM GRANULOCYTES # BLD AUTO: 0.05 X10(3)/MCL (ref 0–0.03)
IMM GRANULOCYTES NFR BLD AUTO: 0.5 % (ref 0–0.5)
LYMPHOCYTES # BLD AUTO: 0.53 X10(3)/MCL (ref 1.32–3.57)
LYMPHOCYTES NFR BLD AUTO: 5.6 % (ref 20–55)
MAGNESIUM SERPL-MCNC: 2.3 MG/DL (ref 1.8–2.4)
MCH RBC QN AUTO: 31.3 PG (ref 27–34)
MCHC RBC AUTO-ENTMCNC: 35.1 G/DL (ref 31–37)
MCV RBC AUTO: 89.1 FL (ref 79–99)
MONOCYTES # BLD AUTO: 0.5 X10(3)/MCL (ref 0.3–0.82)
MONOCYTES NFR BLD AUTO: 5.3 % (ref 4.7–12.5)
NEUTROPHILS # BLD AUTO: 8.33 X10(3)/MCL (ref 1.78–5.38)
NEUTROPHILS NFR BLD AUTO: 88.6 % (ref 37–73)
NRBC BLD AUTO-RTO: 0 %
PHOSPHATE SERPL-MCNC: 5.7 MG/DL (ref 2.5–4.9)
PLATELET # BLD AUTO: 176 X10(3)/MCL (ref 140–371)
PMV BLD AUTO: 11.2 FL (ref 9.4–12.4)
POCT GLUCOSE: 107 MG/DL (ref 70–110)
POCT GLUCOSE: 113 MG/DL (ref 70–110)
POCT GLUCOSE: 122 MG/DL (ref 70–110)
POCT GLUCOSE: 99 MG/DL (ref 70–110)
POTASSIUM SERPL-SCNC: 4.8 MMOL/L (ref 3.5–5.1)
RBC # BLD AUTO: 4.32 X10(6)/MCL (ref 4–6)
SODIUM SERPL-SCNC: 127 MMOL/L (ref 136–145)
WBC # BLD AUTO: 9.41 X10(3)/MCL (ref 4–11.5)

## 2025-03-31 PROCEDURE — 83735 ASSAY OF MAGNESIUM: CPT | Performed by: FAMILY MEDICINE

## 2025-03-31 PROCEDURE — 63600175 PHARM REV CODE 636 W HCPCS: Performed by: FAMILY MEDICINE

## 2025-03-31 PROCEDURE — 25000003 PHARM REV CODE 250: Performed by: INTERNAL MEDICINE

## 2025-03-31 PROCEDURE — 36415 COLL VENOUS BLD VENIPUNCTURE: CPT | Performed by: FAMILY MEDICINE

## 2025-03-31 PROCEDURE — 27000221 HC OXYGEN, UP TO 24 HOURS

## 2025-03-31 PROCEDURE — 21400001 HC TELEMETRY ROOM

## 2025-03-31 PROCEDURE — 25000003 PHARM REV CODE 250: Performed by: FAMILY MEDICINE

## 2025-03-31 PROCEDURE — 94761 N-INVAS EAR/PLS OXIMETRY MLT: CPT

## 2025-03-31 PROCEDURE — 80048 BASIC METABOLIC PNL TOTAL CA: CPT | Performed by: FAMILY MEDICINE

## 2025-03-31 PROCEDURE — 85025 COMPLETE CBC W/AUTO DIFF WBC: CPT | Performed by: FAMILY MEDICINE

## 2025-03-31 PROCEDURE — 84100 ASSAY OF PHOSPHORUS: CPT | Performed by: FAMILY MEDICINE

## 2025-03-31 RX ORDER — MUPIROCIN 20 MG/G
OINTMENT TOPICAL 2 TIMES DAILY
Status: DISCONTINUED | OUTPATIENT
Start: 2025-03-31 | End: 2025-04-02 | Stop reason: HOSPADM

## 2025-03-31 RX ORDER — LORAZEPAM 2 MG/ML
1 INJECTION INTRAMUSCULAR
Status: DISCONTINUED | OUTPATIENT
Start: 2025-03-31 | End: 2025-04-02 | Stop reason: HOSPADM

## 2025-03-31 RX ORDER — MORPHINE SULFATE 4 MG/ML
2 INJECTION, SOLUTION INTRAMUSCULAR; INTRAVENOUS EVERY 4 HOURS PRN
Refills: 0 | Status: DISCONTINUED | OUTPATIENT
Start: 2025-03-31 | End: 2025-04-02 | Stop reason: HOSPADM

## 2025-03-31 RX ADMIN — PIPERACILLIN AND TAZOBACTAM 4.5 G: 4; .5 INJECTION, POWDER, FOR SOLUTION INTRAVENOUS; PARENTERAL at 10:03

## 2025-03-31 RX ADMIN — MORPHINE SULFATE 2 MG: 4 INJECTION, SOLUTION INTRAMUSCULAR; INTRAVENOUS at 02:03

## 2025-03-31 RX ADMIN — PIPERACILLIN AND TAZOBACTAM 4.5 G: 4; .5 INJECTION, POWDER, FOR SOLUTION INTRAVENOUS; PARENTERAL at 02:03

## 2025-03-31 RX ADMIN — ESCITALOPRAM OXALATE 10 MG: 10 TABLET ORAL at 09:03

## 2025-03-31 RX ADMIN — METOPROLOL TARTRATE 25 MG: 25 TABLET, FILM COATED ORAL at 09:03

## 2025-03-31 RX ADMIN — LEUCINE, PHENYLALANINE, LYSINE, METHIONINE, ISOLEUCINE, VALINE, HISTIDINE, THREONINE, TRYPTOPHAN, ALANINE, GLYCINE, ARGININE, PROLINE, SERINE, TYROSINE, DEXTROSE: 311; 238; 247; 170; 255; 247; 204; 179; 77; 880; 438; 489; 289; 213; 17; 5 INJECTION INTRAVENOUS at 05:03

## 2025-03-31 RX ADMIN — MUPIROCIN: 20 OINTMENT TOPICAL at 09:03

## 2025-03-31 RX ADMIN — SODIUM BICARBONATE: 84 INJECTION, SOLUTION INTRAVENOUS at 05:03

## 2025-03-31 RX ADMIN — SACUBITRIL AND VALSARTAN 1 TABLET: 24; 26 TABLET, FILM COATED ORAL at 09:03

## 2025-03-31 RX ADMIN — CALCIUM CARBONATE (ANTACID) CHEW TAB 500 MG 500 MG: 500 CHEW TAB at 09:03

## 2025-03-31 RX ADMIN — FAMOTIDINE 20 MG: 20 TABLET, FILM COATED ORAL at 09:03

## 2025-03-31 RX ADMIN — PIPERACILLIN AND TAZOBACTAM 4.5 G: 4; .5 INJECTION, POWDER, FOR SOLUTION INTRAVENOUS; PARENTERAL at 06:03

## 2025-03-31 NOTE — PLAN OF CARE
Hospice referral made to [facility] per phone [phone] / fax [phone] , spoke with Etelvina.

## 2025-03-31 NOTE — PLAN OF CARE
Phoned into Fast Appeal with [facility] on behalf of [facility] inquiring about appeal decision.  Fast Appeal decision: Denial Overturned, SNF services are now approved.  Auth # [id number]

## 2025-03-31 NOTE — PT/OT/SLP DISCHARGE
Physical Therapy Discharge Summary    Name: William South  MRN: [id number]   Principal Problem: Severe systolic congestive heart failure     Patient Discharged from acute Physical Therapy on 3/31/25.  Please refer to prior PT noted date on 3/28/25 for functional status.     Assessment:     Patient is no longer making progress.    Objective:     GOALS:   Multidisciplinary Problems       Physical Therapy Goals          Problem: Physical Therapy    Goal Priority Disciplines Outcome Interventions   Physical Therapy Goal     PT, PT/OT Unable to Meet    Description: Goals to be met by: discharge     Patient will increase functional independence with mobility by performin. Supine to sit with Modified [place]  2. Sit to stand transfer with Modified [place]  3. Gait  x 150 feet with Modified [place] using Rolling Walker/no AD.                          Reasons for Discontinuation of Therapy Services  Patient is unable to continue work toward goals because of medical or psychosocial complications.      Plan:     Patient Discharged to:  remains inpatient, referred to hospice .      3/31/2025

## 2025-03-31 NOTE — PROGRESS NOTES
[facility]  [place] Note    Patient Name: William South  MRN: [id number]  Patient Class: IP- Inpatient   Admission Date: 3/17/2025  Length of Stay: 13 days  Attending Physician: Yossi Hernández MD  Primary Care Provider: Bette Soliz NP        Subjective     Principal Problem:Severe systolic congestive heart failure        HPI:  69-year-old male with past medical history significant combined congestive heart failure, hypertension, anxiety/depression, tobacco abuse and polysubstance abuse who presented to ED with complaints of shortness for breath leg and feet swelling patient noted increased shortness for breath especially with exertion but that has progressing to when he is at rest over the last few days.  Patient stated he is having difficulty even was walking short distances with increasing shortness a breath.  He noticed swelling to his bilateral feet migraine up his legs.  Patient was recently seen at the end of February with the use prescribed Lasix Xarelto anticoagulation in initiated on call immediate therapy for congestive heart failure.  At that time patient was also diagnosed with a pulmonary embolism he was discharged in his medication instructed to be compliant but patient stated he is not being compliant with medications he got confused with the administration.  During initial evaluation ED he was found to have urine drug screen that was positive for cocaine.  Denies any fever or chills nausea or vomiting.          Past Medical History:   Diagnosis Date    Anxiety disorder, unspecified      CHF (congestive heart failure)      Depression      Hypertension      Hyponatremia        Overview/Hospital Course:  3/19/2025  Sob much better today   Case mgmt working on snf     3/20/2025  No new c/o  Work with PT  Sob better     Today: Patient seen and examined at bedside, and chart reviewed.   03/21/2025 patient resting comfortably awaiting placement.  He denies  any shortness of breath currently.  We will continue his current medications labs look good we will repeat some in the morning.  03/22/2025 patient doing well waiting for placement.  He complains of a little bit of acid indigestion requests some Tums.  He states his bowels are moving normally.  Labs look good repeat some labs tomorrow and prescribe him some Tums.  03/24/2025 pt awake and alert confused, agreed to [facility] group home does not seem to be able to live independentlky, does not take his medicine and does not go to his appoitments.  03/25/2025 awiating insurance and state approval for placement  03/26/2025 no new c/o awaiting placement  03/27/2025 pt with new onset a fib, h/o severe systolic CHF in the past known EF 10%.  PT changed to metoprolol and converted to sinus rhythm overnight HR currently 70-80s and denies chest pain or distress, he is already on DOAC due to recent PE.  Pt sitting up in chair this am, he wants to go home, however is agreeable to SNF at the nursing home.  He is still very weak.  Needs PT to resume prior independent living situation.  03/28/2025 pt confused at time, awaiting appeal with insurance as they have denied him SNF, he is not safe to go home, needs some PT to try to resume prior functional level.  03/29/2025 pt with some sputum with coughed up some blood, bright red still waiting on SNF placement, pt more confused last few days, not eating much, family brought him some outside food and he did not eat that [facility] either.  03/30/2025 pt with worsening mental status yesterday, ABG showed some hypoxia PAO2 was 44, placed on 5L via oximizer, CT negative for mass, possible bilateral infiltrates, started on iv zosyn, pt still sleepy, minimally responsive, repeat CT shows severe ischemic disease. Echo shows ef 5-10% with severe valvular heart disease and grade 3 diastolic dysfunction.  He is not eating much, Started D5 yesterrday due to low blood sugars, encourage po, but he is not  eating any better today.  Will add clinimix.  3/31/2025 sugars a little better   Getting iv nutrition   Mental status poor overall  Spoke with family today about poor prognosis agree to DNR and hospice eval today     Interval History:     Review of Systems   Constitutional:  Positive for appetite change and fatigue. Negative for activity change and fever.   Respiratory:  Negative for cough, shortness of breath and wheezing.    Cardiovascular:  Negative for chest pain and leg swelling.   Gastrointestinal:  Negative for abdominal pain, constipation, diarrhea, nausea and vomiting.   Musculoskeletal:  Positive for myalgias.   Skin:  Negative for rash and wound.   Neurological:  Positive for weakness.   Psychiatric/Behavioral:  Positive for confusion.      Objective:     Vital Signs (Most Recent):  Temp: 97.3 °F (36.3 °C) (03/31/25 1103)  Pulse: 75 (03/31/25 1103)  Resp: 20 (03/31/25 1103)  BP: (!) 131/94 (03/31/25 1103)  SpO2: 100 % (03/31/25 1103) Vital Signs (24h Range):  Temp:  [97.1 °F (36.2 °C)-98.3 °F (36.8 °C)] 97.3 °F (36.3 °C)  Pulse:  [70-93] 75  Resp:  [18-22] 20  SpO2:  [94 %-100 %] 100 %  BP: (112-136)/() 131/94     Weight: 66.8 kg (147 lb 3.2 oz)  Body mass index is 23.05 kg/m².    Intake/Output Summary (Last 24 hours) at 3/31/2025 1218  Last data filed at 3/31/2025 0901  Gross per 24 hour   Intake 2325 ml   Output 101 ml   Net 2224 ml         Physical Exam  Constitutional:       General: He is not in acute distress.     Appearance: Normal appearance. He is ill-appearing. He is not toxic-appearing or diaphoretic.   Cardiovascular:      Rate and Rhythm: Normal rate and regular rhythm.      Pulses: Normal pulses.      Heart sounds: Normal heart sounds. No murmur heard.  Pulmonary:      Effort: Pulmonary effort is normal. No respiratory distress.      Breath sounds: Rales present. No wheezing or rhonchi.   Abdominal:      General: Abdomen is flat.      Palpations: Abdomen is soft.      Tenderness: There  "is no abdominal tenderness. There is no guarding.   Musculoskeletal:      Right lower leg: No edema.      Left lower leg: No edema.   Skin:     General: Skin is warm and dry.      Coloration: Skin is not jaundiced or pale.      Findings: No lesion or rash.   Neurological:      General: No focal deficit present.      Mental Status: He is alert. He is disoriented.      Comments: Less responsive, eyes open, no distress, not answering any questions and minimal reponsiveness   Psychiatric:         Mood and Affect: Mood normal.               Significant Labs: All pertinent labs within the past 24 hours have been reviewed.    Significant Imaging: I have reviewed all pertinent imaging results/findings within the past 24 hours.      Assessment & Plan  Severe systolic congestive heart failure  Patient has Systolic (HFrEF) heart failure that is Acute on chronic. On presentation their CHF was decompensated. Evidence of decompensated CHF on presentation includes: shortness of breath. The etiology of their decompensation is likely dietary indiscretion. Most recent BNP and echo results are listed below.  No results for input(s): "BNP" in the last 72 hours.  Latest ECHO  Results for orders placed during the hospital encounter of 02/26/25    Echo    Interpretation Summary    Left Ventricle: There is severely reduced systolic function with a visually estimated ejection fraction of 5 - 10%. Grade III diastolic dysfunction.    Right Ventricle: Systolic function is mildly reduced.    Aortic Valve: There is mild (1+) aortic regurgitation.    Mitral Valve: There is moderate to severe (3+) regurgitation.    Tricuspid Valve: There is moderate to severe (3+) regurgitation.    Pulmonic Valve: There is mild (1+) regurgitation.    Current Heart Failure Medications  sacubitriL-valsartan 24-26 mg per tablet 1 tablet, 2 times daily, Oral  hydrALAZINE injection 10 mg, Every 6 hours PRN, Intravenous    Plan  - Monitor strict I&Os and daily weights.  "   - continue telemetry  - Low sodium diet  - Placde on fluid restriction of 1.5 L.   - Cardiology has been following  - The patient's volume status is improving but not at their baseline as indicated by shortness of breath  - very poor prognosis, recommended hospice and DNR, family is not in agreement      Substance abuse  Patient urged to stop using drugs    Primary hypertension  Patient's blood pressure range in the last 24 hours was: BP  Min: 112/84  Max: 136/103.The patient's inpatient anti-hypertensive regimen is listed below:  Current Antihypertensives  hydrALAZINE injection 10 mg, Every 6 hours PRN, Intravenous  metoprolol tartrate (LOPRESSOR) tablet 25 mg, 2 times daily, Oral    Plan  - BP is controlled, no changes needed to their regimen  -   GERD (gastroesophageal reflux disease)  Tums PRN    Atrial fibrillation  Patient has paroxysmal (<7 days) atrial fibrillation. Patient is currently in sinus rhythm. [id number] Score: 2. The patients heart rate in the last 24 hours is as follows:  Pulse  Min: 70  Max: 93     Antiarrhythmics  metoprolol tartrate (LOPRESSOR) tablet 25 mg, 2 times daily, Oral    Anticoagulants  rivaroxaban tablet 20 mg, With dinner, Oral    Plan  - Replete lytes with a goal of K>4, Mg >2  - Patient is anticoagulated, see medications listed above.  - Patient's afib is currently controlled  - labs ok, rate controlled and converted to sinus with po metoprolol      Hemoptysis  Held DOAC due to some mild hemoptysis  yesterday  H&H stable and none sice, will resume today  Pt with hypoxia and recent h/o PE    Encephalopathy, metabolic  Multifactorial  H/o Dementia, h/o CVA see old CT head 2 [place] and ischemic disease  H/o severe CHF  CT shows severe ischemic disease, no acute changes    Hyperkalemia  Hyperkalemia is likely due to    .The patients most recent potassium results are listed below.  Recent Labs     03/29/25  0505 03/30/25  0415 03/31/25  0412   K 5.1 5.2* 4.8     Plan  - Monitor for  arrhythmias with EKG and/or continuous telemetry.   - Treat the hyperkalemia with Potassium Binders and   .   - Monitor potassium: Daily  - The patient's hyperkalemia is stable          VTE Risk Mitigation (From admission, onward)           Ordered     rivaroxaban tablet 20 mg  With dinner         03/30/25 1331                    Discharge Planning   CHASIDY: 4/1/2025     Code Status: DNR   Medical Readiness for Discharge Date:   Discharge Plan A: Skilled Nursing Facility   Discharge Delays: (!) Post-Acute Set-up                    Yossi Hernández MD  Department of Hospital Medicine   [facility]

## 2025-03-31 NOTE — ASSESSMENT & PLAN NOTE
Patient has paroxysmal (<7 days) atrial fibrillation. Patient is currently in sinus rhythm. [id number] Score: 2. The patients heart rate in the last 24 hours is as follows:  Pulse  Min: 70  Max: 93     Antiarrhythmics  metoprolol tartrate (LOPRESSOR) tablet 25 mg, 2 times daily, Oral    Anticoagulants  rivaroxaban tablet 20 mg, With dinner, Oral    Plan  - Replete lytes with a goal of K>4, Mg >2  - Patient is anticoagulated, see medications listed above.  - Patient's afib is currently controlled  - labs ok, rate controlled and converted to sinus with po metoprolol

## 2025-03-31 NOTE — SUBJECTIVE & OBJECTIVE
Interval History:     Review of Systems   Constitutional:  Positive for appetite change and fatigue. Negative for activity change and fever.   Respiratory:  Negative for cough, shortness of breath and wheezing.    Cardiovascular:  Negative for chest pain and leg swelling.   Gastrointestinal:  Negative for abdominal pain, constipation, diarrhea, nausea and vomiting.   Musculoskeletal:  Positive for myalgias.   Skin:  Negative for rash and wound.   Neurological:  Positive for weakness.   Psychiatric/Behavioral:  Positive for confusion.      Objective:     Vital Signs (Most Recent):  Temp: 97.3 °F (36.3 °C) (03/31/25 1103)  Pulse: 75 (03/31/25 1103)  Resp: 20 (03/31/25 1103)  BP: (!) 131/94 (03/31/25 1103)  SpO2: 100 % (03/31/25 1103) Vital Signs (24h Range):  Temp:  [97.1 °F (36.2 °C)-98.3 °F (36.8 °C)] 97.3 °F (36.3 °C)  Pulse:  [70-93] 75  Resp:  [18-22] 20  SpO2:  [94 %-100 %] 100 %  BP: (112-136)/() 131/94     Weight: 66.8 kg (147 lb 3.2 oz)  Body mass index is 23.05 kg/m².    Intake/Output Summary (Last 24 hours) at 3/31/2025 1218  Last data filed at 3/31/2025 0901  Gross per 24 hour   Intake 2325 ml   Output 101 ml   Net 2224 ml         Physical Exam  Constitutional:       General: He is not in acute distress.     Appearance: Normal appearance. He is ill-appearing. He is not toxic-appearing or diaphoretic.   Cardiovascular:      Rate and Rhythm: Normal rate and regular rhythm.      Pulses: Normal pulses.      Heart sounds: Normal heart sounds. No murmur heard.  Pulmonary:      Effort: Pulmonary effort is normal. No respiratory distress.      Breath sounds: Rales present. No wheezing or rhonchi.   Abdominal:      General: Abdomen is flat.      Palpations: Abdomen is soft.      Tenderness: There is no abdominal tenderness. There is no guarding.   Musculoskeletal:      Right lower leg: No edema.      Left lower leg: No edema.   Skin:     General: Skin is warm and dry.      Coloration: Skin is not jaundiced or  pale.      Findings: No lesion or rash.   Neurological:      General: No focal deficit present.      Mental Status: He is alert. He is disoriented.      Comments: Less responsive, eyes open, no distress, not answering any questions and minimal reponsiveness   Psychiatric:         Mood and Affect: Mood normal.               Significant Labs: All pertinent labs within the past 24 hours have been reviewed.    Significant Imaging: I have reviewed all pertinent imaging results/findings within the past 24 hours.
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
## 2025-03-31 NOTE — ASSESSMENT & PLAN NOTE
Patient's blood pressure range in the last 24 hours was: BP  Min: 112/84  Max: 136/103.The patient's inpatient anti-hypertensive regimen is listed below:  Current Antihypertensives  hydrALAZINE injection 10 mg, Every 6 hours PRN, Intravenous  metoprolol tartrate (LOPRESSOR) tablet 25 mg, 2 times daily, Oral    Plan  - BP is controlled, no changes needed to their regimen  -

## 2025-03-31 NOTE — PLAN OF CARE
Problem: Adult Inpatient Plan of Care  Goal: Plan of Care Review  Outcome: Progressing  Goal: Patient-Specific Goal (Individualized)  Outcome: Progressing  Goal: Absence of Hospital-Acquired Illness or Injury  Outcome: Progressing  Goal: Optimal Comfort and Wellbeing  Outcome: Progressing  Goal: Readiness for Transition of Care  Outcome: Progressing     Problem: Fall Injury Risk  Goal: Absence of Fall and Fall-Related Injury  Outcome: Progressing     Problem: Comorbidity Management  Goal: Maintenance of Heart Failure Symptom Control  Outcome: Progressing  Goal: Blood Pressure in Desired Range  Outcome: Progressing     Problem: Heart Failure  Goal: Optimal Coping  Outcome: Progressing  Goal: Optimal Cardiac Output  Outcome: Progressing  Goal: Stable Heart Rate and Rhythm  Outcome: Progressing  Goal: Optimal Functional Ability  Outcome: Progressing  Goal: Fluid and Electrolyte Balance  Outcome: Progressing  Goal: Improved Oral Intake  Outcome: Progressing  Goal: Effective Oxygenation and Ventilation  Outcome: Progressing  Goal: Effective Breathing Pattern During Sleep  Outcome: Progressing     Problem: Excessive Substance Use  Goal: Optimized Energy Level (Excessive Substance Use)  Outcome: Progressing  Goal: Improved Behavioral Control (Excessive Substance Use)  Outcome: Progressing  Goal: Increased Participation and Engagement (Excessive Substance Use)  Outcome: Progressing  Goal: Improved Physiologic Symptoms (Excessive Substance Use)  Outcome: Progressing  Goal: Enhanced Social, Occupational or Functional Skills (Excessive Substance Use)  Outcome: Progressing     Problem: Infection  Goal: Absence of Infection Signs and Symptoms  Outcome: Progressing

## 2025-03-31 NOTE — ASSESSMENT & PLAN NOTE
Hyperkalemia is likely due to  .The patients most recent potassium results are listed below.  Recent Labs     03/29/25  0505 03/30/25  0415 03/31/25  0412   K 5.1 5.2* 4.8     Plan  - Monitor for arrhythmias with EKG and/or continuous telemetry.   - Treat the hyperkalemia with Potassium Binders and  .   - Monitor potassium: Daily  - The patient's hyperkalemia is stable

## 2025-03-31 NOTE — PLAN OF CARE
03/31/25 1415   Discharge Reassessment   Assessment Type Discharge Planning Reassessment   Did the patient's condition or plan change since previous assessment? Yes   Discharge Plan discussed with: Patient;Adult children   Name(s) and Number(s) Mimi Lee-Daughter [phone]   Communicated CHASIDY with patient/caregiver Yes   Discharge Plan A New Nursing Home placement - retirement care facility;Hospice/home   Discharge Plan B Inpatient Hospice   DME Needed Upon Discharge  hospital bed;oxygen   Transition of Care Barriers Does not adhere to care plan;Mobility;No family/friends to help;Nursing Home rejection;Transportation;Substance Abuse;Social   Why the patient remains in the hospital Placement issues   Post-Acute Status   Post-Acute Authorization Placement   Post-Acute Placement Status Pending post-acute provider review/more information requested  (Awaiting required docements needed for permanent placement)   Coverage [facility] Managed MCR Dual Complete   Discharge Delays (!) Post-Acute Set-up

## 2025-04-01 LAB
ANION GAP SERPL CALC-SCNC: 2 MEQ/L (ref 2–13)
BACTERIA BLD CULT: NORMAL
BASOPHILS # BLD AUTO: 0 X10(3)/MCL (ref 0.01–0.08)
BASOPHILS NFR BLD AUTO: 0 % (ref 0.1–1.2)
BUN SERPL-MCNC: 52 MG/DL (ref 7–20)
CALCIUM SERPL-MCNC: 8.2 MG/DL (ref 8.4–10.2)
CHLORIDE SERPL-SCNC: 89 MMOL/L (ref 98–110)
CO2 SERPL-SCNC: 36 MMOL/L (ref 21–32)
CREAT SERPL-MCNC: 1.3 MG/DL (ref 0.66–1.25)
CREAT/UREA NIT SERPL: 40 (ref 12–20)
EOSINOPHIL # BLD AUTO: 0.02 X10(3)/MCL (ref 0.04–0.54)
EOSINOPHIL NFR BLD AUTO: 0.3 % (ref 0.7–7)
ERYTHROCYTE [DISTWIDTH] IN BLOOD BY AUTOMATED COUNT: 15 %
GFR SERPLBLD CREATININE-BSD FMLA CKD-EPI: 59 ML/MIN/1.73/M2
GLUCOSE SERPL-MCNC: 91 MG/DL (ref 70–110)
GLUCOSE SERPL-MCNC: 91 MG/DL (ref 70–115)
HCT VFR BLD AUTO: 38.1 % (ref 36–52)
HGB BLD-MCNC: 13.2 G/DL (ref 13–18)
IMM GRANULOCYTES # BLD AUTO: 0.04 X10(3)/MCL (ref 0–0.03)
IMM GRANULOCYTES NFR BLD AUTO: 0.6 % (ref 0–0.5)
LYMPHOCYTES # BLD AUTO: 0.97 X10(3)/MCL (ref 1.32–3.57)
LYMPHOCYTES NFR BLD AUTO: 13.8 % (ref 20–55)
MAGNESIUM SERPL-MCNC: 2.2 MG/DL (ref 1.8–2.4)
MCH RBC QN AUTO: 30.6 PG (ref 27–34)
MCHC RBC AUTO-ENTMCNC: 34.6 G/DL (ref 31–37)
MCV RBC AUTO: 88.4 FL (ref 79–99)
MONOCYTES # BLD AUTO: 0.59 X10(3)/MCL (ref 0.3–0.82)
MONOCYTES NFR BLD AUTO: 8.4 % (ref 4.7–12.5)
NEUTROPHILS # BLD AUTO: 5.4 X10(3)/MCL (ref 1.78–5.38)
NEUTROPHILS NFR BLD AUTO: 76.9 % (ref 37–73)
NRBC BLD AUTO-RTO: 0 %
PHOSPHATE SERPL-MCNC: 3.2 MG/DL (ref 2.5–4.9)
PLATELET # BLD AUTO: 168 X10(3)/MCL (ref 140–371)
PMV BLD AUTO: 11 FL (ref 9.4–12.4)
POCT GLUCOSE: 101 MG/DL (ref 70–110)
POCT GLUCOSE: 111 MG/DL (ref 70–110)
POCT GLUCOSE: 113 MG/DL (ref 70–110)
POTASSIUM SERPL-SCNC: 3.7 MMOL/L (ref 3.5–5.1)
RBC # BLD AUTO: 4.31 X10(6)/MCL (ref 4–6)
SODIUM SERPL-SCNC: 127 MMOL/L (ref 136–145)
WBC # BLD AUTO: 7.02 X10(3)/MCL (ref 4–11.5)

## 2025-04-01 PROCEDURE — 94761 N-INVAS EAR/PLS OXIMETRY MLT: CPT

## 2025-04-01 PROCEDURE — 27000221 HC OXYGEN, UP TO 24 HOURS

## 2025-04-01 PROCEDURE — B4185 PARENTERAL SOL 10 GM LIPIDS: HCPCS | Performed by: FAMILY MEDICINE

## 2025-04-01 PROCEDURE — 25000003 PHARM REV CODE 250: Performed by: FAMILY MEDICINE

## 2025-04-01 PROCEDURE — 84100 ASSAY OF PHOSPHORUS: CPT | Performed by: FAMILY MEDICINE

## 2025-04-01 PROCEDURE — 85025 COMPLETE CBC W/AUTO DIFF WBC: CPT | Performed by: FAMILY MEDICINE

## 2025-04-01 PROCEDURE — 25000003 PHARM REV CODE 250: Performed by: INTERNAL MEDICINE

## 2025-04-01 PROCEDURE — 80048 BASIC METABOLIC PNL TOTAL CA: CPT | Performed by: FAMILY MEDICINE

## 2025-04-01 PROCEDURE — 21400001 HC TELEMETRY ROOM

## 2025-04-01 PROCEDURE — 36415 COLL VENOUS BLD VENIPUNCTURE: CPT | Performed by: FAMILY MEDICINE

## 2025-04-01 PROCEDURE — 63600175 PHARM REV CODE 636 W HCPCS: Performed by: FAMILY MEDICINE

## 2025-04-01 PROCEDURE — 83735 ASSAY OF MAGNESIUM: CPT | Performed by: FAMILY MEDICINE

## 2025-04-01 RX ADMIN — CALCIUM CARBONATE (ANTACID) CHEW TAB 500 MG 500 MG: 500 CHEW TAB at 08:04

## 2025-04-01 RX ADMIN — PIPERACILLIN AND TAZOBACTAM 4.5 G: 4; .5 INJECTION, POWDER, FOR SOLUTION INTRAVENOUS; PARENTERAL at 05:04

## 2025-04-01 RX ADMIN — METOPROLOL TARTRATE 25 MG: 25 TABLET, FILM COATED ORAL at 08:04

## 2025-04-01 RX ADMIN — MORPHINE SULFATE 2 MG: 4 INJECTION, SOLUTION INTRAMUSCULAR; INTRAVENOUS at 10:04

## 2025-04-01 RX ADMIN — SOYBEAN OIL 250 ML: 20 INJECTION, SOLUTION INTRAVENOUS at 05:04

## 2025-04-01 RX ADMIN — PIPERACILLIN AND TAZOBACTAM 4.5 G: 4; .5 INJECTION, POWDER, FOR SOLUTION INTRAVENOUS; PARENTERAL at 10:04

## 2025-04-01 RX ADMIN — MUPIROCIN: 20 OINTMENT TOPICAL at 08:04

## 2025-04-01 RX ADMIN — SACUBITRIL AND VALSARTAN 1 TABLET: 24; 26 TABLET, FILM COATED ORAL at 08:04

## 2025-04-01 RX ADMIN — MORPHINE SULFATE 2 MG: 4 INJECTION, SOLUTION INTRAMUSCULAR; INTRAVENOUS at 04:04

## 2025-04-01 RX ADMIN — ATORVASTATIN CALCIUM 40 MG: 40 TABLET, FILM COATED ORAL at 08:04

## 2025-04-01 RX ADMIN — PIPERACILLIN AND TAZOBACTAM 4.5 G: 4; .5 INJECTION, POWDER, FOR SOLUTION INTRAVENOUS; PARENTERAL at 02:04

## 2025-04-01 RX ADMIN — LEUCINE, PHENYLALANINE, LYSINE, METHIONINE, ISOLEUCINE, VALINE, HISTIDINE, THREONINE, TRYPTOPHAN, ALANINE, GLYCINE, ARGININE, PROLINE, SERINE, TYROSINE, DEXTROSE: 311; 238; 247; 170; 255; 247; 204; 179; 77; 880; 438; 489; 289; 213; 17; 5 INJECTION INTRAVENOUS at 05:04

## 2025-04-01 NOTE — PLAN OF CARE
Problem: Adult Inpatient Plan of Care  Goal: Plan of Care Review  Outcome: Progressing  Goal: Patient-Specific Goal (Individualized)  Outcome: Progressing  Goal: Absence of Hospital-Acquired Illness or Injury  Outcome: Progressing  Goal: Optimal Comfort and Wellbeing  Outcome: Progressing  Goal: Readiness for Transition of Care  Outcome: Progressing     Problem: Fall Injury Risk  Goal: Absence of Fall and Fall-Related Injury  Outcome: Progressing     Problem: Comorbidity Management  Goal: Maintenance of Heart Failure Symptom Control  Outcome: Progressing  Goal: Blood Pressure in Desired Range  Outcome: Progressing     Problem: Heart Failure  Goal: Optimal Coping  Outcome: Progressing  Goal: Optimal Cardiac Output  Outcome: Progressing  Goal: Stable Heart Rate and Rhythm  Outcome: Progressing  Goal: Optimal Functional Ability  Outcome: Progressing  Goal: Fluid and Electrolyte Balance  Outcome: Progressing  Goal: Improved Oral Intake  Outcome: Progressing  Goal: Effective Oxygenation and Ventilation  Outcome: Progressing  Goal: Effective Breathing Pattern During Sleep  Outcome: Progressing     Problem: Excessive Substance Use  Goal: Optimized Energy Level (Excessive Substance Use)  Outcome: Progressing  Goal: Improved Behavioral Control (Excessive Substance Use)  Outcome: Progressing  Goal: Increased Participation and Engagement (Excessive Substance Use)  Outcome: Progressing  Goal: Improved Physiologic Symptoms (Excessive Substance Use)  Outcome: Progressing  Goal: Enhanced Social, Occupational or Functional Skills (Excessive Substance Use)  Outcome: Progressing     Problem: Skin Injury Risk Increased  Goal: Skin Health and Integrity  Outcome: Progressing     Problem: Infection  Goal: Absence of Infection Signs and Symptoms  Outcome: Progressing

## 2025-04-01 NOTE — ASSESSMENT & PLAN NOTE
Hyperkalemia is likely due to  .The patients most recent potassium results are listed below.  Recent Labs     03/30/25  0415 03/31/25  0412 04/01/25  0415   K 5.2* 4.8 3.7     Plan  - Monitor for arrhythmias with EKG and/or continuous telemetry.   - Treat the hyperkalemia with Potassium Binders and  .   - Monitor potassium: Daily  - The patient's hyperkalemia is stable

## 2025-04-01 NOTE — ASSESSMENT & PLAN NOTE
Patient's blood pressure range in the last 24 hours was: BP  Min: 103/81  Max: 117/82.The patient's inpatient anti-hypertensive regimen is listed below:  Current Antihypertensives  hydrALAZINE injection 10 mg, Every 6 hours PRN, Intravenous  metoprolol tartrate (LOPRESSOR) tablet 25 mg, 2 times daily, Oral    Plan  - BP is controlled, no changes needed to their regimen  -

## 2025-04-01 NOTE — ASSESSMENT & PLAN NOTE
Patient has paroxysmal (<7 days) atrial fibrillation. Patient is currently in sinus rhythm. [id number] Score: 2. The patients heart rate in the last 24 hours is as follows:  Pulse  Min: 73  Max: 91     Antiarrhythmics  metoprolol tartrate (LOPRESSOR) tablet 25 mg, 2 times daily, Oral    Anticoagulants  rivaroxaban tablet 20 mg, With dinner, Oral    Plan  - Replete lytes with a goal of K>4, Mg >2  - Patient is anticoagulated, see medications listed above.  - Patient's afib is currently controlled  - labs ok, rate controlled and converted to sinus with po metoprolol

## 2025-04-01 NOTE — SUBJECTIVE & OBJECTIVE
Interval History:     Review of Systems   Constitutional:  Positive for appetite change and fatigue. Negative for activity change and fever.   Respiratory:  Negative for cough, shortness of breath and wheezing.    Cardiovascular:  Negative for chest pain and leg swelling.   Gastrointestinal:  Negative for abdominal pain, constipation, diarrhea, nausea and vomiting.   Musculoskeletal:  Positive for myalgias.   Skin:  Negative for rash and wound.   Neurological:  Positive for weakness.   Psychiatric/Behavioral:  Positive for confusion.      Objective:     Vital Signs (Most Recent):  Temp: 96.7 °F (35.9 °C) (04/01/25 1100)  Pulse: 73 (04/01/25 1100)  Resp: 16 (04/01/25 1100)  BP: 117/82 (04/01/25 1100)  SpO2: 97 % (04/01/25 1100) Vital Signs (24h Range):  Temp:  [96.3 °F (35.7 °C)-98.8 °F (37.1 °C)] 96.7 °F (35.9 °C)  Pulse:  [73-91] 73  Resp:  [16-24] 16  SpO2:  [93 %-100 %] 97 %  BP: (103-117)/(73-85) 117/82     Weight: 66.8 kg (147 lb 3.2 oz)  Body mass index is 23.05 kg/m².    Intake/Output Summary (Last 24 hours) at 4/1/2025 1350  Last data filed at 4/1/2025 0431  Gross per 24 hour   Intake 648.33 ml   Output 101 ml   Net 547.33 ml         Physical Exam  Constitutional:       General: He is not in acute distress.     Appearance: Normal appearance. He is ill-appearing. He is not toxic-appearing or diaphoretic.   Cardiovascular:      Rate and Rhythm: Normal rate and regular rhythm.      Pulses: Normal pulses.      Heart sounds: Normal heart sounds. No murmur heard.  Pulmonary:      Effort: Pulmonary effort is normal. No respiratory distress.      Breath sounds: Rales present. No wheezing or rhonchi.   Abdominal:      General: Abdomen is flat.      Palpations: Abdomen is soft.      Tenderness: There is no abdominal tenderness. There is no guarding.   Musculoskeletal:      Right lower leg: No edema.      Left lower leg: No edema.   Skin:     General: Skin is warm and dry.      Coloration: Skin is not jaundiced or  pale.      Findings: No lesion or rash.   Neurological:      General: No focal deficit present.      Mental Status: He is alert. He is disoriented.      Comments: Less responsive, eyes open, no distress, not answering any questions and minimal reponsiveness   Psychiatric:         Mood and Affect: Mood normal.               Significant Labs: All pertinent labs within the past 24 hours have been reviewed.    Significant Imaging: I have reviewed all pertinent imaging results/findings within the past 24 hours.

## 2025-04-01 NOTE — PROGRESS NOTES
[facility] Medicine  Progress Note    Patient Name: William South  MRN: [id number]  Patient Class: IP- Inpatient   Admission Date: 3/17/2025  Length of Stay: 14 days  Attending Physician: Yossi Hernández MD  Primary Care Provider: Bette Soliz NP        Subjective     Principal Problem:Severe systolic congestive heart failure        HPI:  69-year-old male with past medical history significant combined congestive heart failure, hypertension, anxiety/depression, tobacco abuse and polysubstance abuse who presented to ED with complaints of shortness for breath leg and feet swelling patient noted increased shortness for breath especially with exertion but that has progressing to when he is at rest over the last few days.  Patient stated he is having difficulty even was walking short distances with increasing shortness a breath.  He noticed swelling to his bilateral feet migraine up his legs.  Patient was recently seen at the end of February with the use prescribed Lasix Xarelto anticoagulation in initiated on call immediate therapy for congestive heart failure.  At that time patient was also diagnosed with a pulmonary embolism he was discharged in his medication instructed to be compliant but patient stated he is not being compliant with medications he got confused with the administration.  During initial evaluation ED he was found to have urine drug screen that was positive for cocaine.  Denies any fever or chills nausea or vomiting.          Past Medical History:   Diagnosis Date    Anxiety disorder, unspecified      CHF (congestive heart failure)      Depression      Hypertension      Hyponatremia        Overview/Hospital Course:  3/19/2025  Sob much better today   Case mgmt working on snf     3/20/2025  No new c/o  Work with PT  Sob better     Today: Patient seen and examined at bedside, and chart reviewed.   03/21/2025 patient resting comfortably awaiting placement.  He denies  any shortness of breath currently.  We will continue his current medications labs look good we will repeat some in the morning.  03/22/2025 patient doing well waiting for placement.  He complains of a little bit of acid indigestion requests some Tums.  He states his bowels are moving normally.  Labs look good repeat some labs tomorrow and prescribe him some Tums.  03/24/2025 pt awake and alert confused, agreed to [facility] does not seem to be able to live independentlky, does not take his medicine and does not go to his appoitments.  03/25/2025 awiating insurance and state approval for placement  03/26/2025 no new c/o awaiting placement  03/27/2025 pt with new onset a fib, h/o severe systolic CHF in the past known EF 10%.  PT changed to metoprolol and converted to sinus rhythm overnight HR currently 70-80s and denies chest pain or distress, he is already on DOAC due to recent PE.  Pt sitting up in chair this am, he wants to go home, however is agreeable to SNF at the nursing home.  He is still very weak.  Needs PT to resume prior independent living situation.  03/28/2025 pt confused at time, awaiting appeal with insurance as they have denied him SNF, he is not safe to go home, needs some PT to try to resume prior functional level.  03/29/2025 pt with some sputum with coughed up some blood, bright red still waiting on SNF placement, pt more confused last few days, not eating much, family brought him some outside food and he did not eat that [facility] either.  03/30/2025 pt with worsening mental status yesterday, ABG showed some hypoxia PAO2 was 44, placed on 5L via oximizer, CT negative for mass, possible bilateral infiltrates, started on iv zosyn, pt still sleepy, minimally responsive, repeat CT shows severe ischemic disease. Echo shows ef 5-10% with severe valvular heart disease and grade 3 diastolic dysfunction.  He is not eating much, Started D5 yesterrday due to low blood sugars, encourage po, but he is not  eating any better today.  Will add clinimix.  3/31/2025 sugars a little better   Getting iv nutrition   Mental status poor overall  Spoke with family today about poor prognosis agree to DNR and hospice eval today   4/1/2025 a little more awake today but remains very confused  Cr trending down to 1.3 from 1.72  Case mgmt working on placement     Interval History:     Review of Systems   Constitutional:  Positive for appetite change and fatigue. Negative for activity change and fever.   Respiratory:  Negative for cough, shortness of breath and wheezing.    Cardiovascular:  Negative for chest pain and leg swelling.   Gastrointestinal:  Negative for abdominal pain, constipation, diarrhea, nausea and vomiting.   Musculoskeletal:  Positive for myalgias.   Skin:  Negative for rash and wound.   Neurological:  Positive for weakness.   Psychiatric/Behavioral:  Positive for confusion.      Objective:     Vital Signs (Most Recent):  Temp: 96.7 °F (35.9 °C) (04/01/25 1100)  Pulse: 73 (04/01/25 1100)  Resp: 16 (04/01/25 1100)  BP: 117/82 (04/01/25 1100)  SpO2: 97 % (04/01/25 1100) Vital Signs (24h Range):  Temp:  [96.3 °F (35.7 °C)-98.8 °F (37.1 °C)] 96.7 °F (35.9 °C)  Pulse:  [73-91] 73  Resp:  [16-24] 16  SpO2:  [93 %-100 %] 97 %  BP: (103-117)/(73-85) 117/82     Weight: 66.8 kg (147 lb 3.2 oz)  Body mass index is 23.05 kg/m².    Intake/Output Summary (Last 24 hours) at 4/1/2025 1350  Last data filed at 4/1/2025 0431  Gross per 24 hour   Intake 648.33 ml   Output 101 ml   Net 547.33 ml         Physical Exam  Constitutional:       General: He is not in acute distress.     Appearance: Normal appearance. He is ill-appearing. He is not toxic-appearing or diaphoretic.   Cardiovascular:      Rate and Rhythm: Normal rate and regular rhythm.      Pulses: Normal pulses.      Heart sounds: Normal heart sounds. No murmur heard.  Pulmonary:      Effort: Pulmonary effort is normal. No respiratory distress.      Breath sounds: Rales present.  "No wheezing or rhonchi.   Abdominal:      General: Abdomen is flat.      Palpations: Abdomen is soft.      Tenderness: There is no abdominal tenderness. There is no guarding.   Musculoskeletal:      Right lower leg: No edema.      Left lower leg: No edema.   Skin:     General: Skin is warm and dry.      Coloration: Skin is not jaundiced or pale.      Findings: No lesion or rash.   Neurological:      General: No focal deficit present.      Mental Status: He is alert. He is disoriented.      Comments: Less responsive, eyes open, no distress, not answering any questions and minimal reponsiveness   Psychiatric:         Mood and Affect: Mood normal.               Significant Labs: All pertinent labs within the past 24 hours have been reviewed.    Significant Imaging: I have reviewed all pertinent imaging results/findings within the past 24 hours.      Assessment & Plan  Severe systolic congestive heart failure  Patient has Systolic (HFrEF) heart failure that is Acute on chronic. On presentation their CHF was decompensated. Evidence of decompensated CHF on presentation includes: shortness of breath. The etiology of their decompensation is likely dietary indiscretion. Most recent BNP and echo results are listed below.  No results for input(s): "BNP" in the last 72 hours.  Latest ECHO  Results for orders placed during the hospital encounter of 02/26/25    Echo    Interpretation Summary    Left Ventricle: There is severely reduced systolic function with a visually estimated ejection fraction of 5 - 10%. Grade III diastolic dysfunction.    Right Ventricle: Systolic function is mildly reduced.    Aortic Valve: There is mild (1+) aortic regurgitation.    Mitral Valve: There is moderate to severe (3+) regurgitation.    Tricuspid Valve: There is moderate to severe (3+) regurgitation.    Pulmonic Valve: There is mild (1+) regurgitation.    Current Heart Failure Medications  sacubitriL-valsartan 24-26 mg per tablet 1 tablet, 2 " times daily, Oral  hydrALAZINE injection 10 mg, Every 6 hours PRN, Intravenous    Plan  - Monitor strict I&Os and daily weights.    - continue telemetry  - Low sodium diet  - Placde on fluid restriction of 1.5 L.   - Cardiology has been following  - The patient's volume status is improving but not at their baseline as indicated by shortness of breath  - very poor prognosis, recommended hospice and DNR, family is not in agreement      Substance abuse  Patient urged to stop using drugs    Primary hypertension  Patient's blood pressure range in the last 24 hours was: BP  Min: 103/81  Max: 117/82.The patient's inpatient anti-hypertensive regimen is listed below:  Current Antihypertensives  hydrALAZINE injection 10 mg, Every 6 hours PRN, Intravenous  metoprolol tartrate (LOPRESSOR) tablet 25 mg, 2 times daily, Oral    Plan  - BP is controlled, no changes needed to their regimen  -   GERD (gastroesophageal reflux disease)  Tums PRN    Atrial fibrillation  Patient has paroxysmal (<7 days) atrial fibrillation. Patient is currently in sinus rhythm. [id number] Score: 2. The patients heart rate in the last 24 hours is as follows:  Pulse  Min: 73  Max: 91     Antiarrhythmics  metoprolol tartrate (LOPRESSOR) tablet 25 mg, 2 times daily, Oral    Anticoagulants  rivaroxaban tablet 20 mg, With dinner, Oral    Plan  - Replete lytes with a goal of K>4, Mg >2  - Patient is anticoagulated, see medications listed above.  - Patient's afib is currently controlled  - labs ok, rate controlled and converted to sinus with po metoprolol      Hemoptysis  Held DOAC due to some mild hemoptysis  yesterday  H&H stable and none sice, will resume today  Pt with hypoxia and recent h/o PE    Encephalopathy, metabolic  Multifactorial  H/o Dementia, h/o CVA see old CT head 2 [place] and ischemic disease  H/o severe CHF  CT shows severe ischemic disease, no acute changes    Hyperkalemia  Hyperkalemia is likely due to    .The patients most recent potassium  results are listed below.  Recent Labs     03/30/25  0415 03/31/25  0412 04/01/25  0415   K 5.2* 4.8 3.7     Plan  - Monitor for arrhythmias with EKG and/or continuous telemetry.   - Treat the hyperkalemia with Potassium Binders and   .   - Monitor potassium: Daily  - The patient's hyperkalemia is stable          VTE Risk Mitigation (From admission, onward)           Ordered     rivaroxaban tablet 20 mg  With dinner         03/30/25 1331                    Discharge Planning   CHASIDY: 4/2/2025     Code Status: DNR   Medical Readiness for Discharge Date:   Discharge Plan A: New Nursing Home placement - [facility] care facility, Hospice/home   Discharge Delays: (!) Post-Acute Set-up                    Yossi Hernández MD  Department of Hospital Medicine   [facility]

## 2025-04-02 VITALS
HEIGHT: 67 IN | SYSTOLIC BLOOD PRESSURE: 114 MMHG | DIASTOLIC BLOOD PRESSURE: 86 MMHG | WEIGHT: 147.19 LBS | RESPIRATION RATE: 24 BRPM | OXYGEN SATURATION: 96 % | BODY MASS INDEX: 23.1 KG/M2 | TEMPERATURE: 97 F | HEART RATE: 82 BPM

## 2025-04-02 LAB
ANION GAP SERPL CALC-SCNC: 5 MEQ/L (ref 2–13)
BACTERIA BLD CULT: NORMAL
BASOPHILS # BLD AUTO: 0 X10(3)/MCL (ref 0.01–0.08)
BASOPHILS NFR BLD AUTO: 0 % (ref 0.1–1.2)
BUN SERPL-MCNC: 46 MG/DL (ref 7–20)
CALCIUM SERPL-MCNC: 8.4 MG/DL (ref 8.4–10.2)
CHLORIDE SERPL-SCNC: 89 MMOL/L (ref 98–110)
CO2 SERPL-SCNC: 33 MMOL/L (ref 21–32)
CREAT SERPL-MCNC: 1.23 MG/DL (ref 0.66–1.25)
CREAT/UREA NIT SERPL: 37 (ref 12–20)
EOSINOPHIL # BLD AUTO: 0.03 X10(3)/MCL (ref 0.04–0.54)
EOSINOPHIL NFR BLD AUTO: 0.5 % (ref 0.7–7)
ERYTHROCYTE [DISTWIDTH] IN BLOOD BY AUTOMATED COUNT: 15.3 %
GFR SERPLBLD CREATININE-BSD FMLA CKD-EPI: 64 ML/MIN/1.73/M2
GLUCOSE SERPL-MCNC: 90 MG/DL (ref 70–115)
HCT VFR BLD AUTO: 42.8 % (ref 36–52)
HGB BLD-MCNC: 14.9 G/DL (ref 13–18)
IMM GRANULOCYTES # BLD AUTO: 0.02 X10(3)/MCL (ref 0–0.03)
IMM GRANULOCYTES NFR BLD AUTO: 0.3 % (ref 0–0.5)
LYMPHOCYTES # BLD AUTO: 0.96 X10(3)/MCL (ref 1.32–3.57)
LYMPHOCYTES NFR BLD AUTO: 15.4 % (ref 20–55)
MCH RBC QN AUTO: 31.2 PG (ref 27–34)
MCHC RBC AUTO-ENTMCNC: 34.8 G/DL (ref 31–37)
MCV RBC AUTO: 89.5 FL (ref 79–99)
MONOCYTES # BLD AUTO: 0.53 X10(3)/MCL (ref 0.3–0.82)
MONOCYTES NFR BLD AUTO: 8.5 % (ref 4.7–12.5)
NEUTROPHILS # BLD AUTO: 4.71 X10(3)/MCL (ref 1.78–5.38)
NEUTROPHILS NFR BLD AUTO: 75.3 % (ref 37–73)
NRBC BLD AUTO-RTO: 0 %
PLATELET # BLD AUTO: 179 X10(3)/MCL (ref 140–371)
PMV BLD AUTO: 11.2 FL (ref 9.4–12.4)
POCT GLUCOSE: 78 MG/DL (ref 70–110)
POCT GLUCOSE: 99 MG/DL (ref 70–110)
POTASSIUM SERPL-SCNC: 3.7 MMOL/L (ref 3.5–5.1)
RBC # BLD AUTO: 4.78 X10(6)/MCL (ref 4–6)
SODIUM SERPL-SCNC: 127 MMOL/L (ref 136–145)
WBC # BLD AUTO: 6.25 X10(3)/MCL (ref 4–11.5)

## 2025-04-02 PROCEDURE — 63600175 PHARM REV CODE 636 W HCPCS: Performed by: FAMILY MEDICINE

## 2025-04-02 PROCEDURE — 27000221 HC OXYGEN, UP TO 24 HOURS

## 2025-04-02 PROCEDURE — 80048 BASIC METABOLIC PNL TOTAL CA: CPT | Performed by: FAMILY MEDICINE

## 2025-04-02 PROCEDURE — 36415 COLL VENOUS BLD VENIPUNCTURE: CPT | Performed by: FAMILY MEDICINE

## 2025-04-02 PROCEDURE — 94761 N-INVAS EAR/PLS OXIMETRY MLT: CPT

## 2025-04-02 PROCEDURE — 25000003 PHARM REV CODE 250: Performed by: FAMILY MEDICINE

## 2025-04-02 PROCEDURE — 85025 COMPLETE CBC W/AUTO DIFF WBC: CPT | Performed by: FAMILY MEDICINE

## 2025-04-02 PROCEDURE — 25000003 PHARM REV CODE 250: Performed by: INTERNAL MEDICINE

## 2025-04-02 RX ORDER — METOPROLOL TARTRATE 25 MG/1
25 TABLET, FILM COATED ORAL 2 TIMES DAILY
Start: 2025-04-02 | End: 2026-04-02

## 2025-04-02 RX ADMIN — PIPERACILLIN AND TAZOBACTAM 4.5 G: 4; .5 INJECTION, POWDER, FOR SOLUTION INTRAVENOUS; PARENTERAL at 06:04

## 2025-04-02 RX ADMIN — MORPHINE SULFATE 2 MG: 4 INJECTION, SOLUTION INTRAMUSCULAR; INTRAVENOUS at 04:04

## 2025-04-02 RX ADMIN — MUPIROCIN: 20 OINTMENT TOPICAL at 09:04

## 2025-04-02 RX ADMIN — ESCITALOPRAM OXALATE 10 MG: 10 TABLET ORAL at 09:04

## 2025-04-02 RX ADMIN — CALCIUM CARBONATE (ANTACID) CHEW TAB 500 MG 500 MG: 500 CHEW TAB at 09:04

## 2025-04-02 RX ADMIN — FAMOTIDINE 20 MG: 20 TABLET, FILM COATED ORAL at 09:04

## 2025-04-02 RX ADMIN — SACUBITRIL AND VALSARTAN 1 TABLET: 24; 26 TABLET, FILM COATED ORAL at 09:04

## 2025-04-02 RX ADMIN — MORPHINE SULFATE 2 MG: 4 INJECTION, SOLUTION INTRAMUSCULAR; INTRAVENOUS at 09:04

## 2025-04-02 RX ADMIN — METOPROLOL TARTRATE 25 MG: 25 TABLET, FILM COATED ORAL at 09:04

## 2025-04-02 NOTE — NURSING
Patient transported via stretcher by [facility]. Patient remained on 3L NC and in stable condition upon discharge.

## 2025-04-02 NOTE — PLAN OF CARE
Physician ordered to discharge patient to [facility] with hospice. [facility] was notified per phone/fax of Physician's discharge orders, spoke with Irene. Nursing advised to call report [address] nurse @ [phone].[facility] was notified of pt's discharge, spoke with Etelvina. Hospice nurse to meet and admit patient at [facility].

## 2025-04-02 NOTE — DISCHARGE SUMMARY
"Hospital Medicine  Discharge Summary    Patient Name: William South  MRN: [id number]  Admit Date: 3/17/2025  Discharge Date:  4/2/2025  Status: IP- Inpatient   Length of Stay: 15      PHYSICIANS   Admitting Physician: Yossi Hernández MD  Discharging Physician: Yossi Hernández MD.  Primary Care Physician: Bette Soliz NP      DISCHARGE DIAGNOSES   Severe systolic congestive heart failure  Patient has Systolic (HFrEF) heart failure that is Acute on chronic. On presentation their CHF was decompensated. Evidence of decompensated CHF on presentation includes: shortness of breath. The etiology of their decompensation is likely dietary indiscretion. Most recent BNP and echo results are listed below.  No results for input(s): "BNP" in the last 72 hours.  Latest ECHO  Results for orders placed during the hospital encounter of 02/26/25     Echo     Interpretation Summary    Left Ventricle: There is severely reduced systolic function with a visually estimated ejection fraction of 5 - 10%. Grade III diastolic dysfunction.    Right Ventricle: Systolic function is mildly reduced.    Aortic Valve: There is mild (1+) aortic regurgitation.    Mitral Valve: There is moderate to severe (3+) regurgitation.    Tricuspid Valve: There is moderate to severe (3+) regurgitation.    Pulmonic Valve: There is mild (1+) regurgitation.     Current Heart Failure Medications  sacubitriL-valsartan 24-26 mg per tablet 1 tablet, 2 times daily, Oral  hydrALAZINE injection 10 mg, Every 6 hours PRN, Intravenous     Plan  - Monitor strict I&Os and daily weights.    - continue telemetry  - Low sodium diet  - Placde on fluid restriction of 1.5 L.   - Cardiology has been following  - The patient's volume status is improving but not at their baseline as indicated by shortness of breath  - very poor prognosis, recommended hospice and DNR, family is not in agreement        Substance abuse  Patient urged to stop using drugs     Primary " hypertension  Patient's blood pressure range in the last 24 hours was: BP  Min: 103/81  Max: 117/82.The patient's inpatient anti-hypertensive regimen is listed below:  Current Antihypertensives  hydrALAZINE injection 10 mg, Every 6 hours PRN, Intravenous  metoprolol tartrate (LOPRESSOR) tablet 25 mg, 2 times daily, Oral     Plan  - BP is controlled, no changes needed to their regimen  -   GERD (gastroesophageal reflux disease)  Tums PRN     Atrial fibrillation  Patient has paroxysmal (<7 days) atrial fibrillation. Patient is currently in sinus rhythm. [id number] Score: 2. The patients heart rate in the last 24 hours is as follows:  Pulse  Min: 73  Max: 91      Antiarrhythmics  metoprolol tartrate (LOPRESSOR) tablet 25 mg, 2 times daily, Oral     Anticoagulants  rivaroxaban tablet 20 mg, With dinner, Oral     Plan  - Replete lytes with a goal of K>4, Mg >2  - Patient is anticoagulated, see medications listed above.  - Patient's afib is currently controlled  - labs ok, rate controlled and converted to sinus with po metoprolol        Hemoptysis  Held DOAC due to some mild hemoptysis  yesterday  H&H stable and none sice, will resume today  Pt with hypoxia and recent h/o PE     Encephalopathy, metabolic  Multifactorial  H/o Dementia, h/o CVA see old CT head 2 [place] and ischemic disease  H/o severe CHF  CT shows severe ischemic disease, no acute changes     Hyperkalemia  Hyperkalemia is likely due to    .The patients most recent potassium results are listed below.        Recent Labs     03/30/25  0415 03/31/25  0412 04/01/25  0415   K 5.2* 4.8 3.7      Plan  - Monitor for arrhythmias with EKG and/or continuous telemetry.   - Treat the hyperkalemia with Potassium Binders and   .   - Monitor potassium: Daily  - The patient's hyperkalemia is stable         PROCEDURES   * No surgery found *         HOSPITAL COURSE      69-year-old male with past medical history significant combined congestive heart failure, hypertension,  anxiety/depression, tobacco abuse and polysubstance abuse who presented to ED with complaints of shortness for breath leg and feet swelling patient noted increased shortness for breath especially with exertion but that has progressing to when he is at rest over the last few days.  Patient stated he is having difficulty even was walking short distances with increasing shortness a breath.  He noticed swelling to his bilateral feet migraine up his legs.  Patient was recently seen at the end of February with the use prescribed Lasix Xarelto anticoagulation in initiated on call immediate therapy for congestive heart failure.  At that time patient was also diagnosed with a pulmonary embolism he was discharged in his medication instructed to be compliant but patient stated he is not being compliant with medications he got confused with the administration.  During initial evaluation ED he was found to have urine drug screen that was positive for cocaine.  Denies any fever or chills nausea or vomiting.             Past Medical History:   Diagnosis Date    Anxiety disorder, unspecified      CHF (congestive heart failure)      Depression      Hypertension      Hyponatremia           Overview/Hospital Course:  3/19/2025  Sob much better today   Case mgmt working on snf     3/20/2025  No new c/o  Work with PT  Sob better     Today: Patient seen and examined at bedside, and chart reviewed.   03/21/2025 patient resting comfortably awaiting placement.  He denies any shortness of breath currently.  We will continue his current medications labs look good we will repeat some in the morning.  03/22/2025 patient doing well waiting for placement.  He complains of a little bit of acid indigestion requests some Tums.  He states his bowels are moving normally.  Labs look good repeat some labs tomorrow and prescribe him some Tums.  03/24/2025 pt awake and alert confused, agreed to [place] long-term does not seem to be able to live  independentlky, does not take his medicine and does not go to his appoitments.  03/25/2025 awiating insurance and state approval for placement  03/26/2025 no new c/o awaiting placement  03/27/2025 pt with new onset a fib, h/o severe systolic CHF in the past known EF 10%.  PT changed to metoprolol and converted to sinus rhythm overnight HR currently 70-80s and denies chest pain or distress, he is already on DOAC due to recent PE.  Pt sitting up in chair this am, he wants to go home, however is agreeable to SNF at the nursing home.  He is still very weak.  Needs PT to resume prior independent living situation.  03/28/2025 pt confused at time, awaiting appeal with insurance as they have denied him SNF, he is not safe to go home, needs some PT to try to resume prior functional level.  03/29/2025 pt with some sputum with coughed up some blood, bright red still waiting on SNF placement, pt more confused last few days, not eating much, family brought him some outside food and he did not eat that [facility] either.  03/30/2025 pt with worsening mental status yesterday, ABG showed some hypoxia PAO2 was 44, placed on 5L via oximizer, CT negative for mass, possible bilateral infiltrates, started on iv zosyn, pt still sleepy, minimally responsive, repeat CT shows severe ischemic disease. Echo shows ef 5-10% with severe valvular heart disease and grade 3 diastolic dysfunction.  He is not eating much, Started D5 yesterrday due to low blood sugars, encourage po, but he is not eating any better today.  Will add clinimix.  3/31/2025 sugars a little better   Getting iv nutrition   Mental status poor overall  Spoke with family today about poor prognosis agree to DNR and hospice eval today   4/1/2025 a little more awake today but remains very confused  Cr trending down to 1.3 from 1.72  Case mgmt working on placement   4/2/2025 accepted to NH with hospice today     STATUS  stable    DISPOSITION  Discharge to NH with hospice      DIET      ACTIVITY  As tolerated      FOLLOW-UP         DISCHARGE MEDICATION RECONCILIATION        Medication List        START taking these medications      metoprolol tartrate 25 MG tablet  Commonly known as: LOPRESSOR  Take 1 tablet (25 mg total) by mouth 2 (two) times daily.            CHANGE how you take these medications      rivaroxaban 20 mg Tab  Commonly known as: XARELTO  Take 1 tablet (20 mg total) by mouth daily with dinner or evening meal.  What changed: Another medication with the same name was removed. Continue taking this medication, and follow the directions you see here.            CONTINUE taking these medications      atorvastatin 40 MG tablet  Commonly known as: LIPITOR     buPROPion 150 MG TB24 tablet  Commonly known as: WELLBUTRIN XL  Take 1 tablet (150 mg total) by mouth once daily.     EScitalopram oxalate 10 MG tablet  Commonly known as: LEXAPRO  Take 1 tablet (10 mg total) by mouth once daily.     furosemide 20 MG tablet  Commonly known as: LASIX  Take 1 tablet (20 mg total) by mouth 2 (two) times daily.     meloxicam 7.5 MG tablet  Commonly known as: MOBIC     pantoprazole 40 MG tablet  Commonly known as: PROTONIX     sacubitriL-valsartan 24-26 mg per tablet  Commonly known as: ENTRESTO  Take 1 tablet by mouth 2 (two) times daily.            STOP taking these medications      carvediloL 3.125 MG tablet  Commonly known as: COREG               Where to Get Your Medications        Information about where to get these medications is not yet available    Ask your nurse or doctor about these medications  metoprolol tartrate 25 MG tablet           PHYSICAL EXAM   VITALS: T 96.6 °F (35.9 °C)   /88   P 84   RR 20   O2 (!) 93 %    Constitutional:       General: He is not in acute distress.     Appearance: Normal appearance.  He is not toxic-appearing or diaphoretic.   Cardiovascular:      Rate and Rhythm: Normal rate and regular rhythm.      Pulses: Normal pulses.      Heart sounds:  "Normal heart sounds. No murmur heard.  Pulmonary:      Effort: Pulmonary effort is normal. No respiratory distress.      Breath sounds: . No wheezing or rhonchi.   Abdominal:      General: Abdomen is flat.      Palpations: Abdomen is soft.      Tenderness: There is no abdominal tenderness. There is no guarding.   Musculoskeletal:      Right lower leg: No edema.      Left lower leg: No edema.   Skin:     General: Skin is warm and dry.      Coloration: Skin is not jaundiced or pale.      Findings: No lesion or rash.   Neurological:      General: No focal deficit present.   Mental Status: He is alert. He is disoriented       DIAGNOSITCS   CBC:   Recent Labs   Lab 03/31/25  0412 04/01/25  0415 04/02/25  0458   WBC 9.41 7.02 6.25   HGB 13.5 13.2 14.9   HCT 38.5 38.1 42.8    168 179       CMP:   Recent Labs   Lab 03/30/25  1351 03/31/25  0412 04/01/25  0415 04/02/25  0458   CALCIUM  --  8.4 8.2* 8.4   NA  --  127* 127* 127*   K  --  4.8 3.7 3.7   CO2  --  31 36* 33*   CL  --  88* 89* 89*   BUN  --  62* 52* 46*   CREATININE  --  1.73* 1.30* 1.23   MG 2.50* 2.30 2.20  --    PHOS  --  5.7* 3.2  --      Estimated Creatinine Clearance: 53 mL/min (based on SCr of 1.23 mg/dL).    Labs within the past 24 hours have been reviewed.     COAG:  No results for input(s): "APTT", "INR", "PTT" in the last 168 hours.    CARDIAC ENZYMES: No results for input(s): "TROPONINI", "CPK", "CKMB" in the last 72 hours.     No results for input(s): "AMYLASE", "LIPASE" in the last 168 hours.    Recent Labs     03/31/25  1657 03/31/25  2339 04/01/25  1118 04/01/25  1738 04/01/25  2346 04/02/25  0423   POCTGLUCOSE 113* 107 101 113* 111* 78        Microbiology Results (last 7 days)       Procedure Component Value Units Date/Time    Blood Culture [[id number]] Collected: 03/27/25 1900    Order Status: Completed Specimen: Blood from Arm, Right Updated: 04/02/25 0619     Blood Culture Final Report: At 5 days. No growth    Blood Culture [[id number]] " "Collected: 03/27/25 1910    Order Status: Completed Specimen: Blood from Arm, Right Updated: 04/01/25 2002     Blood Culture No Growth at 5 days             No results for input(s): "CHOL", "TRIG", "HDL", "LDL" in the last 72 hours.   Lab Results   Component Value Date    HGBA1C 5.5 02/07/2024        X-Ray Chest 1 View for Line/Tube Placement  Result Date: 3/30/2025  EXAMINATION: XR CHEST 1 VIEW FOR LINE/TUBE PLACEMENT CLINICAL HISTORY: picc line; TECHNIQUE: Single frontal portable view of the chest was performed. COMPARISON: 03/27/2025 the FINDINGS: There is a PICC line on the right with the distal end overlying superior vena cava.  There are increased markings bilaterally there is appears to be a right-sided pleural effusion.     Placement of PICC line as described above. Electronically signed by: Juvencio Mcmanus MD Date:    03/30/2025 Time:    22:39    CT Chest With Contrast  Result Date: 3/29/2025  EXAMINATION: CT CHEST WITH CONTRAST CLINICAL HISTORY: Hemoptysis; TECHNIQUE: CT axial images, sagittal and coronal reformations were obtained through the chest.    Automated exposure control was utilized to limit radiation exposure to the patient. DLP for the study is 281 mgycm. COMPARISON: CT angiogram of the chest 03/17/2025 and frontal chest 03/27/2025 FINDINGS: There is a right pleural effusion with an AP diameter of 42 mm.  There is a left pleural effusion with an AP diameter of 11 mm.  Cardiac chamber enlargement is noted.  There is no pericardial fluid.  The ascending aorta has a maximum diameter of 46 mm.  There is no aneurysmal dilatation of the thoracic aorta. There is consolidation in the left upper lobe and to a lesser degree in the right upper lobe.  Diffuse ground-glass attenuation is present. Anasarca is noted.  There is free fluid in the abdomen.  There is no acute abnormality on the included images of the base of the neck.  There are extensive left chest wall collaterals.  This study demonstrates no " obvious venous thrombosis.     1. No bilateral pleural effusions, right larger than left. 2. Cardiac chamber enlargement. 3. Bilateral consolidation probably reflects pneumonia. 4. Mild aneurysmal dilatation of the ascending aorta. 5. Anasarca. 6. There are multiple left chest wall collaterals without a visible venous thrombosis. Electronically signed by: Dimitris Polladr MD Date:    03/29/2025 Time:    13:14    CT Head Without Contrast  Result Date: 3/29/2025  EXAMINATION: CT HEAD WITHOUT CONTRAST CLINICAL HISTORY: Mental status change, unknown cause; TECHNIQUE: Low dose axial CT images obtained throughout the head without intravenous contrast. Sagittal and coronal reconstructions were performed.  Automated exposure control was utilized to limit radiation exposure to the patient.  Total DLP for this study was 1242 mgycm. COMPARISON: CT of the head 03/22/2024 FINDINGS: There is no acute brain parenchymal finding.  There is no parenchymal mass, hemorrhage, edema or major vascular distribution infarct. Mild volume loss is noted.  Findings suggestive of chronic microvascular ischemia are noted.  Heterogeneous density of the brainstem is probably secondary to both degeneration of along white matter tracts and to the hardening artifact from the skull base.  Intracranial calcific atherosclerosis is present.  This study demonstrates no acute hemorrhage. Please note the sensitivity of CT for subarachnoid hemorrhage is at best approximately 90%. Skull/included extracranial contents: There is no displaced  fracture. Included mastoid air cells and paranasal sinuses are clear.     1. No acute intracranial abnormality.  Please note that chronic ischemic changes could obscure superimposed acute ischemia. Electronically signed by: Dimitris Pollard MD Date:    03/29/2025 Time:    13:11    X-Ray Chest AP Portable  Result Date: 3/27/2025  EXAMINATION: XR CHEST AP PORTABLE CLINICAL HISTORY: abnormal temp; TECHNIQUE: Single frontal  view of the chest was performed. COMPARISON: 03/17/2025 FINDINGS: The heart is again noted to be enlarged with prominence to the central pulmonary vasculature and there is mild bilateral perihilar stranding, the latter appears to represent a new finding.  There is a small right-sided pleural effusion.  A skinfold is noted along the periphery of the right lung.     Interim development of bilateral perihilar stranding. Cardiomegaly with slight prominence to the central pulmonary vasculature and previously seen small right-sided pleural effusion. Electronically signed by: Yamil Najera Date:    03/27/2025 Time:    19:34    CTA Chest Non-Coronary (PE Studies)  Addendum Date: 3/17/2025  There are typographical errors in #5 in the IMPRESSION.  This sentence should read: 5.  Similar posterior dependent layering right pleural effusion with marginating alveolar opacities. Electronically signed by: Wiley Pepe MD Date:    03/17/2025 Time:    14:33    Result Date: 3/17/2025  CT ANGIOGRAM OF THE CHEST WITH AND WITHOUT CONTRAST AND WITH 3D POSTPROCESSING: CPT: 63231, 86799 Total DLP: 347.0 mGy-cm. Automatic exposure control was utilized to limit the radiation dose to the patient. Total contrast: 96 mL in unspecified peripheral vein. History: Worsening shortness of breath with pulmonary thromboemboli on comparison exam from 02/26/2025. Comparison exam: As above. Technique: Multiple thin cut axial images were acquired a through the chest before and during intravenous contrast administration.  The source images were viewed at the workstation. MIP and volume rendering was performed on the source images with 3-D reconstructions. The angiographic reconstructions were reviewed by the radiologist. Findings: This study is significantly limited due to poor contrast bolus timing with maximum opacification of the left innominate and subclavian veins, left innominate vein, SVC, and right atrium, which causes significant streak artifact.   There is poor mixing of contrast in the right greater left pulmonary arteries.  As on the prior exam, there are soft tissue density foci occluding segmental and subsegmental pulmonary arteries to the right lower lobe and questionably to the right middle and upper lobes and multiple peripheral subsegmental pulmonary arteries on the left due to the artifact from poor contrast bolus timing and other factors.  There is no evidence for right heart strain.  There is cardiomegaly.  The thoracic aorta is normal in caliber, but there is no contrast opacification to a be able to evaluate for possible dissection.  No obvious mediastinal or hilar adenopathy is identified, but evaluation for this is limited due to poor contrast bolus timing.  The thyroid gland is obscured in the thoracic inlet.  There is a similar sized posterior dependent right pleural effusion.  No left effusion or pneumothorax is identified.  There are emphysematous changes in both lungs more prominent in the apices.  There are patchy alveolar opacities in the right greater left lung bases.     1. This study is significantly limited due to poor contrast bolus timing, as above, and there is poor mixing of contrast in the right greater left pulmonary arteries. 2. As on the prior exam, there are occlusive pulmonary thromboemboli in segmental and subsegmental pulmonary arteries to the right lower lobe and questionably to the right middle and upper lobes and multiple peripheral left subsegmental pulmonary arteries  versus artifact.  There is no evidence for right heart strain. 3. On the prior exam, there was nonocclusive thrombus in the distal right main pulmonary artery.  On today's exam, this appears more as poor mixing of contrast without definite thrombus. 4. Cardiomegaly. 5. Similar posterior dependent layering right pleural effusion with marginating there are opacities. Electronically signed by: Wiley Pepe MD Date:    03/17/2025 Time:    12:47    X-Ray  Chest AP Portable  Result Date: 3/17/2025  EXAMINATION: XR CHEST AP PORTABLE CLINICAL HISTORY: Chest pain and shortness of breath with history of pulmonary thromboemboli. TECHNIQUE: Single frontal view of the chest was performed. COMPARISON: 02/26/2025. FINDINGS: There is a similar right posterior dependent lying pleural effusion with some extension in the right lung base and costophrenic angle and fissures.  Alveolar opacities marginate the effusion.  There is interval decrease in previously seen alveolar opacities in the left lung base.  No left effusion or pneumothorax is present.  There is similar cardiomegaly and prominent main pulmonary vessels.     1. Similar right pleural effusion with marginating alveolar opacities. 2. Alveolar opacities in left lung base have decreased. 3. Similar cardiomegaly and prominent central pulmonary vessels. Electronically signed by: Wiley Pepe MD Date:    03/17/2025 Time:    14:31      N/A     Patient Screened for food insecurity, housing instability, transportation needs, utility difficulties, and interpersonal safety.  No needs identified    Discharge time: 33 minutes         Yossi Hernández MD  [facility] Medicine

## 2025-04-02 NOTE — PLAN OF CARE
Rec'd call from thiago @ [facility] stating they can admit patient today, nursing informed and will notify MD for discharge orders.

## 2025-04-02 NOTE — PLAN OF CARE
Problem: Adult Inpatient Plan of Care  Goal: Plan of Care Review  Outcome: Met  Goal: Patient-Specific Goal (Individualized)  Outcome: Met  Goal: Absence of Hospital-Acquired Illness or Injury  Outcome: Met  Goal: Optimal Comfort and Wellbeing  Outcome: Met  Goal: Readiness for Transition of Care  Outcome: Met     Problem: Fall Injury Risk  Goal: Absence of Fall and Fall-Related Injury  Outcome: Met     Problem: Comorbidity Management  Goal: Maintenance of Heart Failure Symptom Control  Outcome: Met  Goal: Blood Pressure in Desired Range  Outcome: Met     Problem: Heart Failure  Goal: Optimal Coping  Outcome: Met  Goal: Optimal Cardiac Output  Outcome: Met  Goal: Stable Heart Rate and Rhythm  Outcome: Met  Goal: Optimal Functional Ability  Outcome: Met  Goal: Fluid and Electrolyte Balance  Outcome: Met  Goal: Improved Oral Intake  Outcome: Met  Goal: Effective Oxygenation and Ventilation  Outcome: Met  Goal: Effective Breathing Pattern During Sleep  Outcome: Met     Problem: Excessive Substance Use  Goal: Optimized Energy Level (Excessive Substance Use)  Outcome: Met  Goal: Improved Behavioral Control (Excessive Substance Use)  Outcome: Met  Goal: Increased Participation and Engagement (Excessive Substance Use)  Outcome: Met  Goal: Improved Physiologic Symptoms (Excessive Substance Use)  Outcome: Met  Goal: Enhanced Social, Occupational or Functional Skills (Excessive Substance Use)  Outcome: Met     Problem: Skin Injury Risk Increased  Goal: Skin Health and Integrity  Outcome: Met     Problem: Infection  Goal: Absence of Infection Signs and Symptoms  Outcome: Met

## 2025-04-02 NOTE — NURSING
Report given to SAMUEL Orozco at [facility]. Nurse reported no questions at this time. Transportation set up with [facility]. ETA about 2 hours

## 2025-04-14 ENCOUNTER — DOCUMENT SCAN (OUTPATIENT)
Dept: HOME HEALTH SERVICES | Facility: HOSPITAL | Age: 70
End: 2025-04-14
Payer: MEDICARE

## 2025-04-27 ENCOUNTER — EXTERNAL HOME HEALTH (OUTPATIENT)
Dept: HOME HEALTH SERVICES | Facility: HOSPITAL | Age: 70
End: 2025-04-27
Payer: MEDICARE

## 2025-05-07 ENCOUNTER — DOCUMENT SCAN (OUTPATIENT)
Dept: HOME HEALTH SERVICES | Facility: HOSPITAL | Age: 70
End: 2025-05-07
Payer: MEDICARE

## 2025-05-07 ENCOUNTER — TELEPHONE (OUTPATIENT)
Dept: FAMILY MEDICINE | Facility: CLINIC | Age: 70
End: 2025-05-07
Payer: MEDICARE